# Patient Record
Sex: MALE | Race: WHITE | NOT HISPANIC OR LATINO | Employment: OTHER | URBAN - NONMETROPOLITAN AREA
[De-identification: names, ages, dates, MRNs, and addresses within clinical notes are randomized per-mention and may not be internally consistent; named-entity substitution may affect disease eponyms.]

---

## 2022-03-16 ENCOUNTER — OFFICE VISIT (OUTPATIENT)
Dept: CARDIOLOGY | Facility: CLINIC | Age: 62
End: 2022-03-16

## 2022-03-16 VITALS
HEIGHT: 67 IN | DIASTOLIC BLOOD PRESSURE: 81 MMHG | OXYGEN SATURATION: 97 % | HEART RATE: 72 BPM | BODY MASS INDEX: 32.9 KG/M2 | WEIGHT: 209.6 LBS | SYSTOLIC BLOOD PRESSURE: 131 MMHG

## 2022-03-16 DIAGNOSIS — R06.02 SHORTNESS OF BREATH: ICD-10-CM

## 2022-03-16 DIAGNOSIS — I10 PRIMARY HYPERTENSION: ICD-10-CM

## 2022-03-16 DIAGNOSIS — R07.2 PRECORDIAL PAIN: Primary | ICD-10-CM

## 2022-03-16 PROCEDURE — 99204 OFFICE O/P NEW MOD 45 MIN: CPT | Performed by: PHYSICIAN ASSISTANT

## 2022-03-16 RX ORDER — LEVOTHYROXINE SODIUM 0.05 MG/1
50 TABLET ORAL DAILY
Status: ON HOLD | COMMUNITY
End: 2022-07-14

## 2022-03-16 RX ORDER — ATORVASTATIN CALCIUM 20 MG/1
20 TABLET, FILM COATED ORAL DAILY
COMMUNITY
End: 2022-07-20 | Stop reason: HOSPADM

## 2022-03-16 RX ORDER — BUSPIRONE HYDROCHLORIDE 10 MG/1
10 TABLET ORAL 3 TIMES DAILY
COMMUNITY

## 2022-03-16 RX ORDER — QUETIAPINE FUMARATE 50 MG/1
50 TABLET, FILM COATED ORAL NIGHTLY
COMMUNITY
End: 2022-08-04

## 2022-03-16 RX ORDER — LOSARTAN POTASSIUM 100 MG/1
100 TABLET ORAL DAILY
Status: ON HOLD | COMMUNITY
End: 2022-07-20 | Stop reason: SDUPTHER

## 2022-03-16 RX ORDER — ASPIRIN 81 MG/1
81 TABLET ORAL DAILY
COMMUNITY
End: 2022-07-20 | Stop reason: HOSPADM

## 2022-03-16 RX ORDER — DULOXETIN HYDROCHLORIDE 60 MG/1
60 CAPSULE, DELAYED RELEASE ORAL DAILY
COMMUNITY

## 2022-03-16 RX ORDER — TIZANIDINE 4 MG/1
4 TABLET ORAL 2 TIMES DAILY PRN
COMMUNITY

## 2022-03-16 RX ORDER — CARVEDILOL 6.25 MG/1
6.25 TABLET ORAL 2 TIMES DAILY
Qty: 60 TABLET | Refills: 5 | Status: SHIPPED | OUTPATIENT
Start: 2022-03-16 | End: 2022-07-20 | Stop reason: HOSPADM

## 2022-03-16 RX ORDER — CETIRIZINE HYDROCHLORIDE 10 MG/1
10 TABLET ORAL DAILY
COMMUNITY

## 2022-03-16 RX ORDER — LANOLIN ALCOHOL/MO/W.PET/CERES
1000 CREAM (GRAM) TOPICAL DAILY
COMMUNITY

## 2022-03-16 RX ORDER — OMEPRAZOLE 40 MG/1
40 CAPSULE, DELAYED RELEASE ORAL DAILY
COMMUNITY

## 2022-03-16 NOTE — PROGRESS NOTES
Subjective   Josh Horan is a 61 y.o. male     Chief Complaint   Patient presents with   • Establish Care     ABN Ekg 2/15/2022 in chart    • Chest Pain       HPI  The patient presents into the clinic today for establishment of cardiac care.  This very pleasant patient is referred in the setting of chest pain, hypertension, and issues otherwise.  He denies cardiovascular history.  He reports a longstanding history of chest discomfort, but this is worsened significantly after his diagnosis of COVID several weeks ago.  He reports that historically he would have mild chest aching with occasional numbness to bilateral upper extremities.  Now, since COVID, he has had intense precordial burning and pressure at times.  He has referral to mainly the left upper extremity, but occasionally the right upper extremity as well.  Symptoms tend to occur with exertion or when very stressed.  He gets very dyspneic when experiencing chest discomfort.  Occasionally he will have tachycardia and palpitations at that time as well.  He really has had no dizziness nor syncope.  He notes that since COVID he also has had progressive hypertension.  He is very compliant with losartan, but despite the same, he typically has an average blood pressure of 145/85 and higher.  He did see his primary care provider where laboratories were performed.  Those were benign.  An EKG indicated sinus rhythm with minor nonspecific ST and T wave changes.  With all the above it was recommended that the patient be referred on for cardiac evaluation and consideration of cardiac work-up at that time.      Current Outpatient Medications   Medication Sig Dispense Refill   • aspirin 81 MG EC tablet Take 81 mg by mouth Daily.     • atorvastatin (LIPITOR) 20 MG tablet Take 20 mg by mouth Daily.     • busPIRone (BUSPAR) 10 MG tablet Take 10 mg by mouth 3 (Three) Times a Day.     • cetirizine (zyrTEC) 10 MG tablet Take 10 mg by mouth Daily.     • DULoxetine (CYMBALTA) 60  MG capsule Take 60 mg by mouth Daily.     • Ergocalciferol (VITAMIN D2 PO) Take  by mouth.     • levothyroxine (Euthyrox) 50 MCG tablet Take 50 mcg by mouth Daily.     • losartan (COZAAR) 100 MG tablet Take 100 mg by mouth Daily.     • omeprazole (priLOSEC) 40 MG capsule Take 40 mg by mouth Daily.     • QUEtiapine (SEROquel) 50 MG tablet Take 50 mg by mouth Every Night.     • tiZANidine (ZANAFLEX) 4 MG tablet Take 4 mg by mouth At Night As Needed for Muscle Spasms.     • vitamin B-12 (CYANOCOBALAMIN) 1000 MCG tablet Take 1,000 mcg by mouth Daily.       No current facility-administered medications for this visit.       Patient has no known allergies.    Past Medical History:   Diagnosis Date   • Acid reflux    • Anxiety    • Arthritis    • Hyperlipidemia    • Hypertension    • Lyme disease    • Seasonal allergies    • Sleep apnea        Social History     Socioeconomic History   • Marital status: Single   Tobacco Use   • Smoking status: Current Every Day Smoker   • Smokeless tobacco: Never Used   Substance and Sexual Activity   • Alcohol use: Never   • Drug use: Yes     Types: Marijuana   • Sexual activity: Defer       Family History   Problem Relation Age of Onset   • Hypertension Mother    • Heart disease Mother    • Cancer Mother    • Heart disease Father    • Heart attack Father        Review of Systems   Constitutional: Positive for fatigue. Negative for chills and fever.   HENT: Positive for congestion and rhinorrhea. Negative for sore throat.    Eyes: Positive for visual disturbance (glasses).   Respiratory: Positive for chest tightness and shortness of breath. Negative for wheezing.    Cardiovascular: Positive for palpitations. Negative for chest pain and leg swelling.   Gastrointestinal: Negative.    Endocrine: Negative.    Genitourinary: Negative.    Musculoskeletal: Positive for arthralgias, back pain and neck pain.   Skin: Negative.  Negative for rash and wound.   Allergic/Immunologic: Positive for  "environmental allergies.   Neurological: Positive for dizziness, numbness (arms / legs ) and headaches. Negative for weakness.   Hematological: Negative.  Does not bruise/bleed easily.   Psychiatric/Behavioral: Sleep disturbance: c-pap ( not using )        Objective     Vitals:    03/16/22 1453   BP: 131/81   BP Location: Left arm   Patient Position: Sitting   Pulse: 72   SpO2: 97%   Weight: 95.1 kg (209 lb 9.6 oz)   Height: 170.2 cm (67\")        /81 (BP Location: Left arm, Patient Position: Sitting)   Pulse 72   Ht 170.2 cm (67\")   Wt 95.1 kg (209 lb 9.6 oz)   SpO2 97%   BMI 32.83 kg/m²      Lab Results (most recent)     None          Physical Exam  Vitals and nursing note reviewed.   Constitutional:       General: He is not in acute distress.     Appearance: He is well-developed.   HENT:      Head: Normocephalic and atraumatic.   Eyes:      Conjunctiva/sclera: Conjunctivae normal.      Pupils: Pupils are equal, round, and reactive to light.   Neck:      Vascular: No JVD.      Trachea: No tracheal deviation.   Cardiovascular:      Rate and Rhythm: Normal rate and regular rhythm.      Heart sounds: Normal heart sounds.   Pulmonary:      Effort: Pulmonary effort is normal.      Breath sounds: Normal breath sounds.   Abdominal:      General: Bowel sounds are normal. There is no distension.      Palpations: Abdomen is soft. There is no mass.      Tenderness: There is no abdominal tenderness. There is no guarding or rebound.   Musculoskeletal:         General: No tenderness or deformity. Normal range of motion.      Cervical back: Normal range of motion and neck supple.   Skin:     General: Skin is warm and dry.      Coloration: Skin is not pale.      Findings: No erythema or rash.   Neurological:      Mental Status: He is alert and oriented to person, place, and time.   Psychiatric:         Behavior: Behavior normal.         Thought Content: Thought content normal.         Judgment: Judgment normal. "         Procedure   Procedures         Assessment/Plan      Diagnosis Plan   1. Precordial pain  Adult Transthoracic Echo Complete W/ Cont if Necessary Per Protocol    Stress Test With Myocardial Perfusion One Day   2. Shortness of breath  Adult Transthoracic Echo Complete W/ Cont if Necessary Per Protocol    Stress Test With Myocardial Perfusion One Day   3. Primary hypertension  Adult Transthoracic Echo Complete W/ Cont if Necessary Per Protocol    Stress Test With Myocardial Perfusion One Day     1.  With chest discomfort and dyspnea, as well as clinical scenario otherwise as of recent, I would like to schedule the patient for cardiac evaluation.  I would schedule the patient for nuclear stress test for ischemia assessment.    2.  I would also schedule the patient for an echocardiogram.  We need to evaluate LV size and function, valvular morphologies, and cardiac structure otherwise.    3.  The patient remains hypertensive despite losartan 100 mg daily, reported compliance with the same.  I would like to go ahead and institute further antihypertensive medication at this time.  We will start him on carvedilol 6.25 mg twice daily dosing.  He will monitor blood pressure and heart rates and call us for any issues with the same.  If blood pressures do not improve on that therapy, he will call back and we can adjust further.    4.  We have discussed lifestyle modifications including diet and exercise with this gentleman otherwise.    5.  We will see him back to review stress and echo findings and recommend him further at that time.  He will call for complications.                      Electronically signed by:

## 2022-04-06 ENCOUNTER — TELEPHONE (OUTPATIENT)
Dept: CARDIOLOGY | Facility: CLINIC | Age: 62
End: 2022-04-06

## 2022-04-06 NOTE — TELEPHONE ENCOUNTER
Spoke to Otilia - she will try to have patient call with BP log by 1 pm     AT [facility]

## 2022-04-06 NOTE — TELEPHONE ENCOUNTER
Spoke to Dulce Piña NP     Patient needs to try and get on a routine taking medication as prescribed so we can start to see a change in BP readings.     He needs to redo BP log for 1 weeks with before med reading an after med reading.     Spoke to patient on above note he verbalized OK , he will call back with before an after readings.     AT [facility]         _________________________________  Patient was started on Carvedilol 6.25      Patient stated chest pain / palpitations have decreased  but BP is still not right      He stated BP readings are kind of all over the place  due to him sleeping in late and not taking medication on a routine schedule.         3/23  150/91 before Med.  After med.  137/94 @ 4:30 ,  Night 136/85     3/24  146/105 before med.  Night 131/87 some point  that night after meds       3/25  160/108 before med.      3/26 144/100 before med.     3/27 161/109 before med     3/28 160/109 before med.  Night 158/99    3/31  145/96 before med.      4/4   147/101 after med.     4/6   155/108  before med.       I told patient I will have a provider look over and will call him back with what needs to be done.     AT [facility]

## 2022-04-27 ENCOUNTER — HOSPITAL ENCOUNTER (OUTPATIENT)
Dept: CARDIOLOGY | Facility: HOSPITAL | Age: 62
Discharge: HOME | End: 2022-04-27

## 2022-04-27 VITALS — HEIGHT: 67 IN | WEIGHT: 209.66 LBS | BODY MASS INDEX: 32.91 KG/M2

## 2022-04-27 DIAGNOSIS — I10 PRIMARY HYPERTENSION: ICD-10-CM

## 2022-04-27 DIAGNOSIS — R06.02 SHORTNESS OF BREATH: ICD-10-CM

## 2022-04-27 DIAGNOSIS — R07.2 PRECORDIAL PAIN: ICD-10-CM

## 2022-04-27 PROCEDURE — A9500 TC99M SESTAMIBI: HCPCS | Performed by: INTERNAL MEDICINE

## 2022-04-27 PROCEDURE — 93017 CV STRESS TEST TRACING ONLY: CPT

## 2022-04-27 PROCEDURE — 0 TECHNETIUM SESTAMIBI: Performed by: INTERNAL MEDICINE

## 2022-04-27 PROCEDURE — 93306 TTE W/DOPPLER COMPLETE: CPT | Performed by: INTERNAL MEDICINE

## 2022-04-27 PROCEDURE — 78452 HT MUSCLE IMAGE SPECT MULT: CPT | Performed by: INTERNAL MEDICINE

## 2022-04-27 PROCEDURE — 93018 CV STRESS TEST I&R ONLY: CPT | Performed by: INTERNAL MEDICINE

## 2022-04-27 PROCEDURE — 25010000002 REGADENOSON 0.4 MG/5ML SOLUTION: Performed by: INTERNAL MEDICINE

## 2022-04-27 PROCEDURE — 93306 TTE W/DOPPLER COMPLETE: CPT

## 2022-04-27 PROCEDURE — 78452 HT MUSCLE IMAGE SPECT MULT: CPT

## 2022-04-27 RX ADMIN — TECHNETIUM TC 99M SESTAMIBI 1 DOSE: 1 INJECTION INTRAVENOUS at 12:22

## 2022-04-27 RX ADMIN — TECHNETIUM TC 99M SESTAMIBI 1 DOSE: 1 INJECTION INTRAVENOUS at 13:42

## 2022-04-27 RX ADMIN — REGADENOSON 0.4 MG: 0.08 INJECTION, SOLUTION INTRAVENOUS at 13:42

## 2022-04-29 LAB
BH CV REST NUCLEAR ISOTOPE DOSE: 10 MCI
BH CV STRESS COMMENTS STAGE 1: NORMAL
BH CV STRESS DOSE REGADENOSON STAGE 1: 0.4
BH CV STRESS DURATION MIN STAGE 1: 0
BH CV STRESS DURATION SEC STAGE 1: 10
BH CV STRESS NUCLEAR ISOTOPE DOSE: 30 MCI
BH CV STRESS PROTOCOL 1: NORMAL
BH CV STRESS RECOVERY BP: NORMAL MMHG
BH CV STRESS RECOVERY HR: 55 BPM
BH CV STRESS STAGE 1: 1
MAXIMAL PREDICTED HEART RATE: 161 BPM
PERCENT MAX PREDICTED HR: 38.51 %
STRESS BASELINE BP: NORMAL MMHG
STRESS BASELINE HR: 54 BPM
STRESS PERCENT HR: 45 %
STRESS POST PEAK BP: NORMAL MMHG
STRESS POST PEAK HR: 62 BPM
STRESS TARGET HR: 137 BPM

## 2022-05-03 ENCOUNTER — TELEPHONE (OUTPATIENT)
Dept: CARDIOLOGY | Facility: CLINIC | Age: 62
End: 2022-05-03

## 2022-05-03 NOTE — TELEPHONE ENCOUNTER
Patient to be seen in office 5/10/2022 @ 9 am to go over ABN Stress     AT [facility]         ----- Message from MARBIN Muñoz sent at 5/2/2022  9:06 AM EDT -----  Follow-up within a couple of weeks.

## 2022-05-08 LAB
AORTIC DIMENSIONLESS INDEX: 0.9 (DI)
BH CV ECHO MEAS - ACS: 1.86 CM
BH CV ECHO MEAS - AO MAX PG: 5.8 MMHG
BH CV ECHO MEAS - AO MEAN PG: 3.3 MMHG
BH CV ECHO MEAS - AO ROOT DIAM: 3 CM
BH CV ECHO MEAS - AO V2 MAX: 120.8 CM/SEC
BH CV ECHO MEAS - AO V2 VTI: 28.2 CM
BH CV ECHO MEAS - AVA(I,D): 2.49 CM2
BH CV ECHO MEAS - EDV(CUBED): 78.2 ML
BH CV ECHO MEAS - EDV(MOD-SP4): 87.8 ML
BH CV ECHO MEAS - EF(MOD-SP4): 57.9 %
BH CV ECHO MEAS - EF_3D-VOL: 52 %
BH CV ECHO MEAS - ESV(CUBED): 17.9 ML
BH CV ECHO MEAS - ESV(MOD-SP4): 37 ML
BH CV ECHO MEAS - FS: 38.8 %
BH CV ECHO MEAS - IVS/LVPW: 0.82 CM
BH CV ECHO MEAS - IVSD: 1.07 CM
BH CV ECHO MEAS - LA DIMENSION: 3.8 CM
BH CV ECHO MEAS - LAT PEAK E' VEL: 11.1 CM/SEC
BH CV ECHO MEAS - LV DIASTOLIC VOL/BSA (35-75): 42.6 CM2
BH CV ECHO MEAS - LV MASS(C)D: 181.2 GRAMS
BH CV ECHO MEAS - LV MAX PG: 4.8 MMHG
BH CV ECHO MEAS - LV MEAN PG: 2.46 MMHG
BH CV ECHO MEAS - LV SYSTOLIC VOL/BSA (12-30): 18 CM2
BH CV ECHO MEAS - LV V1 MAX: 109.3 CM/SEC
BH CV ECHO MEAS - LV V1 VTI: 22.3 CM
BH CV ECHO MEAS - LVIDD: 4.3 CM
BH CV ECHO MEAS - LVIDS: 2.6 CM
BH CV ECHO MEAS - LVOT AREA: 3.1 CM2
BH CV ECHO MEAS - LVOT DIAM: 2 CM
BH CV ECHO MEAS - LVPWD: 1.31 CM
BH CV ECHO MEAS - MED PEAK E' VEL: 6.9 CM/SEC
BH CV ECHO MEAS - MV A MAX VEL: 64.7 CM/SEC
BH CV ECHO MEAS - MV DEC SLOPE: 280.8 CM/SEC2
BH CV ECHO MEAS - MV DEC TIME: 0.19 MSEC
BH CV ECHO MEAS - MV E MAX VEL: 73.3 CM/SEC
BH CV ECHO MEAS - MV E/A: 1.13
BH CV ECHO MEAS - MV MAX PG: 2.8 MMHG
BH CV ECHO MEAS - MV MEAN PG: 1.07 MMHG
BH CV ECHO MEAS - MV P1/2T: 79.3 MSEC
BH CV ECHO MEAS - MV V2 VTI: 31.3 CM
BH CV ECHO MEAS - MVA(P1/2T): 2.8 CM2
BH CV ECHO MEAS - MVA(VTI): 2.24 CM2
BH CV ECHO MEAS - PA V2 MAX: 90.8 CM/SEC
BH CV ECHO MEAS - RV MAX PG: 1.62 MMHG
BH CV ECHO MEAS - RV V1 MAX: 63.7 CM/SEC
BH CV ECHO MEAS - RV V1 VTI: 14.6 CM
BH CV ECHO MEAS - RVDD: 2.8 CM
BH CV ECHO MEAS - SI(MOD-SP4): 24.7 ML/M2
BH CV ECHO MEAS - SV(LVOT): 70.2 ML
BH CV ECHO MEAS - SV(MOD-SP4): 50.8 ML
BH CV ECHO MEASUREMENTS AVERAGE E/E' RATIO: 8.14
LEFT ATRIUM VOLUME INDEX: 22.5 ML/M2
MAXIMAL PREDICTED HEART RATE: 161 BPM
STRESS TARGET HR: 137 BPM

## 2022-05-10 ENCOUNTER — OFFICE VISIT (OUTPATIENT)
Dept: CARDIOLOGY | Facility: CLINIC | Age: 62
End: 2022-05-10

## 2022-05-10 ENCOUNTER — TELEPHONE (OUTPATIENT)
Dept: CARDIOLOGY | Facility: CLINIC | Age: 62
End: 2022-05-10

## 2022-05-10 VITALS
DIASTOLIC BLOOD PRESSURE: 93 MMHG | SYSTOLIC BLOOD PRESSURE: 146 MMHG | BODY MASS INDEX: 33.68 KG/M2 | HEART RATE: 60 BPM | OXYGEN SATURATION: 97 % | HEIGHT: 67 IN | WEIGHT: 214.6 LBS

## 2022-05-10 DIAGNOSIS — R94.39 ABNORMAL NUCLEAR STRESS TEST: ICD-10-CM

## 2022-05-10 DIAGNOSIS — R07.2 PRECORDIAL PAIN: ICD-10-CM

## 2022-05-10 DIAGNOSIS — R06.02 SHORTNESS OF BREATH: ICD-10-CM

## 2022-05-10 PROCEDURE — 99214 OFFICE O/P EST MOD 30 MIN: CPT | Performed by: PHYSICIAN ASSISTANT

## 2022-05-10 RX ORDER — NITROGLYCERIN 0.4 MG/1
TABLET SUBLINGUAL
Qty: 25 TABLET | Refills: 3 | Status: SHIPPED | OUTPATIENT
Start: 2022-05-10

## 2022-05-10 RX ORDER — CLOPIDOGREL BISULFATE 75 MG/1
75 TABLET ORAL DAILY
Qty: 30 TABLET | Refills: 2 | Status: SHIPPED | OUTPATIENT
Start: 2022-05-10 | End: 2022-07-07

## 2022-05-10 NOTE — PROGRESS NOTES
Problem list     Subjective   Josh Horan is a 61 y.o. male     Chief Complaint   Patient presents with   • Follow-up     2 month - ABN testing review    • Chest Pain       HPI  The patient presents back to clinic today to review stress test findings.  We had initially seen him in the setting of symptoms and abnormal EKG findings.  He was subsequent scheduled for stress and an echo.  Stress test suggested a large inferior wall ischemic defect with a mild anterior ischemic wall defect suggested as well.  Echo supported preserved systolic function with no significant valvular nor structural abnormalities.  The patient still does poorly.  Now, with exertion, he has chest tightness.  This limits activity and minimizes what he can complete.  His dyspnea has intensified as well.  He reports no failure nor dysrhythmic symptoms.  He does report ongoing hypertensive issues at times, but since starting carvedilol at last appointment, blood pressures have improved.  The patient has no further complaints otherwise and again presents today to discuss stress test findings.    Current Outpatient Medications on File Prior to Visit   Medication Sig Dispense Refill   • aspirin 81 MG EC tablet Take 81 mg by mouth Daily.     • atorvastatin (LIPITOR) 20 MG tablet Take 20 mg by mouth Daily.     • busPIRone (BUSPAR) 10 MG tablet Take 10 mg by mouth 3 (Three) Times a Day.     • carvedilol (COREG) 6.25 MG tablet Take 1 tablet by mouth 2 (Two) Times a Day. 60 tablet 5   • cetirizine (zyrTEC) 10 MG tablet Take 10 mg by mouth Daily.     • DULoxetine (CYMBALTA) 60 MG capsule Take 60 mg by mouth Daily.     • Ergocalciferol (VITAMIN D2 PO) Take  by mouth.     • levothyroxine (SYNTHROID, LEVOTHROID) 50 MCG tablet Take 50 mcg by mouth Daily.     • losartan (COZAAR) 100 MG tablet Take 100 mg by mouth Daily.     • omeprazole (priLOSEC) 40 MG capsule Take 40 mg by mouth Daily.     • QUEtiapine (SEROquel) 50 MG tablet Take 50 mg by mouth Every Night.      • tiZANidine (ZANAFLEX) 4 MG tablet Take 4 mg by mouth At Night As Needed for Muscle Spasms.     • vitamin B-12 (CYANOCOBALAMIN) 1000 MCG tablet Take 1,000 mcg by mouth Daily.       No current facility-administered medications on file prior to visit.       Patient has no known allergies.    Past Medical History:   Diagnosis Date   • Acid reflux    • Anxiety    • Arthritis    • Hyperlipidemia    • Hypertension    • Lyme disease    • Seasonal allergies    • Sleep apnea        Social History     Socioeconomic History   • Marital status: Single   Tobacco Use   • Smoking status: Current Every Day Smoker   • Smokeless tobacco: Never Used   Substance and Sexual Activity   • Alcohol use: Never   • Drug use: Yes     Types: Marijuana   • Sexual activity: Defer       Family History   Problem Relation Age of Onset   • Hypertension Mother    • Heart disease Mother    • Cancer Mother    • Heart disease Father    • Heart attack Father        Review of Systems   Constitutional: Positive for chills and fatigue. Negative for fever.   HENT: Negative for congestion, rhinorrhea and sore throat.    Eyes: Positive for visual disturbance (glasses).   Respiratory: Positive for shortness of breath. Negative for chest tightness and wheezing.    Cardiovascular: Positive for chest pain. Negative for leg swelling.   Gastrointestinal: Negative.    Endocrine: Negative.    Genitourinary: Negative.    Musculoskeletal: Positive for arthralgias, back pain and neck pain.   Skin: Negative.  Negative for rash and wound.   Allergic/Immunologic: Positive for environmental allergies.   Neurological: Positive for dizziness and headaches. Negative for weakness and numbness.   Hematological: Negative.  Does not bruise/bleed easily.   Psychiatric/Behavioral: Positive for sleep disturbance (c-pap).       Objective   Vitals:    05/10/22 0858   BP: 146/93   BP Location: Left arm   Patient Position: Sitting   Pulse: 60   SpO2: 97%   Weight: 97.3 kg (214 lb 9.6  "oz)   Height: 170 cm (66.93\")      /93 (BP Location: Left arm, Patient Position: Sitting)   Pulse 60   Ht 170 cm (66.93\")   Wt 97.3 kg (214 lb 9.6 oz)   SpO2 97%   BMI 33.68 kg/m²    Lab Results (most recent)     None        Physical Exam  Vitals and nursing note reviewed.   Constitutional:       General: He is not in acute distress.     Appearance: He is well-developed.   HENT:      Head: Normocephalic and atraumatic.   Eyes:      Conjunctiva/sclera: Conjunctivae normal.      Pupils: Pupils are equal, round, and reactive to light.   Neck:      Vascular: No JVD.      Trachea: No tracheal deviation.   Cardiovascular:      Rate and Rhythm: Normal rate and regular rhythm.      Heart sounds: Normal heart sounds.   Pulmonary:      Effort: Pulmonary effort is normal.      Breath sounds: Normal breath sounds.   Abdominal:      General: Bowel sounds are normal. There is no distension.      Palpations: Abdomen is soft. There is no mass.      Tenderness: There is no abdominal tenderness. There is no guarding or rebound.   Musculoskeletal:         General: No tenderness or deformity. Normal range of motion.      Cervical back: Normal range of motion and neck supple.   Skin:     General: Skin is warm and dry.      Coloration: Skin is not pale.      Findings: No erythema or rash.   Neurological:      Mental Status: He is alert and oriented to person, place, and time.   Psychiatric:         Behavior: Behavior normal.         Thought Content: Thought content normal.         Judgment: Judgment normal.           Procedure   Procedures            Diagnosis Plan   1. Precordial pain  [facility]    CBC & Differential    Basic Metabolic Panel   2. Shortness of breath  [facility]    CBC & Differential    Basic Metabolic Panel   3. Abnormal nuclear stress test  [facility]    CBC & Differential    Basic Metabolic Panel     1.  At this time, the patient presents for follow-up with stress test " findings.  He has a large inferior wall ischemic defect and a possible mild anterior wall defect noted as well.  Systolic function appears to be normal both by post stress findings and echo findings as well.  Unfortunately, he has demonstrated progressive chest tightness and shortness of air, felt to represent anginal equivalent symptoms.  In this setting, I feel he needs further evaluation with catheterization and he will be scheduled accordingly.    2.  He will continue aspirin therapy.  We will start Plavix in the precath setting.  We did also give him refills for nitroglycerin.    3.  He will continue statin therapy as well.  We will make no further adjustments in medications.    4.  He will need laboratories in the precath setting, all as outlined above.    5.  He remains hypertensive but overall improved on carvedilol therapy.  He will continue to follow blood pressures and call back for any complications with the same.  We can adjust accordingly.    6.  Further pending results of catheterization.                      Electronically signed by:

## 2022-05-10 NOTE — TELEPHONE ENCOUNTER
ECHO  Pt notified of no acute findings. Provider will discuss results at f/u. Pt reminded of appt date and time.  ----- Message from Marisela Gutiérrez MA sent at 5/10/2022  8:22 AM EDT -----    ----- Message -----  From: Josh Kennedy PA  Sent: 5/9/2022   8:59 AM EDT  To: Marisela Gutiérrez MA    Routine follow-up.

## 2022-06-13 ENCOUNTER — TELEPHONE (OUTPATIENT)
Dept: CARDIOLOGY | Facility: CLINIC | Age: 62
End: 2022-06-13

## 2022-06-13 NOTE — TELEPHONE ENCOUNTER
Spoke to patient to check on pre-cath labs - he stated he had them done 6/9/2022 with his PCP       I have called the office of [facility] - they are faxing to us.       AT [facility]

## 2022-06-24 ENCOUNTER — OUTSIDE FACILITY SERVICE (OUTPATIENT)
Dept: CARDIOLOGY | Facility: CLINIC | Age: 62
End: 2022-06-24

## 2022-06-24 DIAGNOSIS — R06.02 SHORTNESS OF BREATH: ICD-10-CM

## 2022-06-24 DIAGNOSIS — R94.39 ABNORMAL NUCLEAR STRESS TEST: ICD-10-CM

## 2022-06-24 DIAGNOSIS — R07.2 PRECORDIAL PAIN: ICD-10-CM

## 2022-06-24 PROCEDURE — 93458 L HRT ARTERY/VENTRICLE ANGIO: CPT | Performed by: INTERNAL MEDICINE

## 2022-06-27 ENCOUNTER — OFFICE VISIT (OUTPATIENT)
Dept: CARDIAC SURGERY | Facility: CLINIC | Age: 62
End: 2022-06-27

## 2022-06-27 ENCOUNTER — HOSPITAL ENCOUNTER (OUTPATIENT)
Dept: CARDIOLOGY | Facility: HOSPITAL | Age: 62
Discharge: HOME | End: 2022-06-27

## 2022-06-27 VITALS
OXYGEN SATURATION: 98 % | SYSTOLIC BLOOD PRESSURE: 130 MMHG | WEIGHT: 218 LBS | BODY MASS INDEX: 34.21 KG/M2 | HEART RATE: 61 BPM | DIASTOLIC BLOOD PRESSURE: 90 MMHG | HEIGHT: 67 IN | TEMPERATURE: 98.2 F

## 2022-06-27 DIAGNOSIS — Z00.6 ENCOUNTER FOR EXAMINATION FOR NORMAL COMPARISON OR CONTROL IN CLINICAL RESEARCH PROGRAM: ICD-10-CM

## 2022-06-27 DIAGNOSIS — R93.1 ABNORMAL FINDINGS ON DIAGNOSTIC IMAGING OF HEART AND CORONARY CIRCULATION: ICD-10-CM

## 2022-06-27 DIAGNOSIS — I25.10 CORONARY ARTERY DISEASE INVOLVING NATIVE HEART, UNSPECIFIED VESSEL OR LESION TYPE, UNSPECIFIED WHETHER ANGINA PRESENT: Primary | ICD-10-CM

## 2022-06-27 DIAGNOSIS — I25.118 CORONARY ARTERY DISEASE OF NATIVE ARTERY OF NATIVE HEART WITH STABLE ANGINA PECTORIS: Primary | ICD-10-CM

## 2022-06-27 PROCEDURE — 99205 OFFICE O/P NEW HI 60 MIN: CPT | Performed by: THORACIC SURGERY (CARDIOTHORACIC VASCULAR SURGERY)

## 2022-06-27 RX ORDER — IBUPROFEN 800 MG/1
800 TABLET ORAL EVERY 6 HOURS PRN
COMMUNITY

## 2022-06-27 NOTE — PROGRESS NOTES
06/27/2022  Patient Information  Josh Ashton [facility] [address]  [place] [address]   1960  'PCP/Referring Physician'  Jerrica Madrid, APRN  [phone]  No ref. provider found    Chief Complaint   Patient presents with   • Consult     NP per Dr. Salguero for CAD-Complains of Night Sweats, Vomiting, SOB       History of Present Illness:  The patient is a 61-year-old male who was referred to me to evaluate for coronary bypass grafting surgery.  He has had a 3-month history of worsening shortness of breath and chest pain with activity. A catheterization demonstrated significant multivessel disease.  At this time in the office he is alert, conversant and pain-free.  He does have a history of Lyme disease and says this does affect his memory somewhat.      Patient Active Problem List   Diagnosis   • Coronary artery disease of native artery of native heart with stable angina pectoris (HCC)   • Abnormal findings on diagnostic imaging of heart and coronary circulation     Past Medical History:   Diagnosis Date   • Acid reflux    • Anxiety    • Arthritis    • Coronary artery disease    • Depression    • Hiatal hernia    • Hyperlipidemia    • Hypertension    • Hyperthyroidism    • Lyme disease    • Seasonal allergies    • Sleep apnea      Past Surgical History:   Procedure Laterality Date   • OTHER SURGICAL HISTORY      Lymph node removal neck   • VASECTOMY         Current Outpatient Medications:   •  aspirin 81 MG EC tablet, Take 81 mg by mouth Daily., Disp: , Rfl:   •  atorvastatin (LIPITOR) 20 MG tablet, Take 20 mg by mouth Daily., Disp: , Rfl:   •  busPIRone (BUSPAR) 10 MG tablet, Take 10 mg by mouth 3 (Three) Times a Day., Disp: , Rfl:   •  carvedilol (COREG) 6.25 MG tablet, Take 1 tablet by mouth 2 (Two) Times a Day., Disp: 60 tablet, Rfl: 5  •  cetirizine (zyrTEC) 10 MG tablet, Take 10 mg by mouth Daily., Disp: , Rfl:   •   clopidogrel (PLAVIX) 75 MG tablet, Take 1 tablet by mouth Daily., Disp: 30 tablet, Rfl: 2  •  DULoxetine (CYMBALTA) 60 MG capsule, Take 60 mg by mouth Daily., Disp: , Rfl:   •  Ergocalciferol (VITAMIN D2 PO), Take  by mouth., Disp: , Rfl:   •  ibuprofen (ADVIL,MOTRIN) 800 MG tablet, Take 800 mg by mouth Every 6 (Six) Hours As Needed for Mild Pain ., Disp: , Rfl:   •  levothyroxine (SYNTHROID, LEVOTHROID) 50 MCG tablet, Take 50 mcg by mouth Daily. 150 mcg daily, Disp: , Rfl:   •  losartan (COZAAR) 100 MG tablet, Take 100 mg by mouth Daily., Disp: , Rfl:   •  nitroglycerin (NITROSTAT) 0.4 MG SL tablet, 1 under the tongue as needed for angina, may repeat q5mins for up three doses, Disp: 25 tablet, Rfl: 3  •  omeprazole (priLOSEC) 40 MG capsule, Take 40 mg by mouth Daily., Disp: , Rfl:   •  QUEtiapine (SEROquel) 50 MG tablet, Take 50 mg by mouth Every Night., Disp: , Rfl:   •  tiZANidine (ZANAFLEX) 4 MG tablet, Take 4 mg by mouth At Night As Needed for Muscle Spasms., Disp: , Rfl:   •  vitamin B-12 (CYANOCOBALAMIN) 1000 MCG tablet, Take 1,000 mcg by mouth Daily. (Patient not taking: Reported on 6/27/2022), Disp: , Rfl:   No Known Allergies  Social History     Socioeconomic History   • Marital status: Single   • Number of children: 2   Tobacco Use   • Smoking status: Never Smoker   • Smokeless tobacco: Never Used   Vaping Use   • Vaping Use: Never used   Substance and Sexual Activity   • Alcohol use: Never   • Drug use: Yes     Types: Marijuana   • Sexual activity: Defer     Family History   Problem Relation Age of Onset   • Hypertension Mother    • Heart disease Mother    • Cancer Mother    • Heart disease Father    • Heart attack Father      Review of Systems   Constitutional: Positive for chills, malaise/fatigue and night sweats (60 lbs in the past year). Negative for fever and weight loss.   HENT: Negative for hearing loss, odynophagia and sore throat.    Cardiovascular: Positive for chest pain, dyspnea on exertion  "and palpitations. Negative for leg swelling and orthopnea.   Respiratory: Positive for cough, shortness of breath and wheezing. Negative for hemoptysis.    Endocrine: Negative for cold intolerance, heat intolerance, polydipsia, polyphagia and polyuria.   Hematologic/Lymphatic: Does not bruise/bleed easily.   Skin: Negative for itching and rash.   Musculoskeletal: Positive for arthritis and joint pain. Negative for joint swelling and myalgias.   Gastrointestinal: Positive for abdominal pain, constipation, diarrhea, nausea and vomiting. Negative for hematemesis, hematochezia and melena.   Genitourinary: Negative for dysuria, frequency and hematuria.   Neurological: Positive for dizziness and light-headedness. Negative for focal weakness, headaches, numbness and seizures.   Psychiatric/Behavioral: Positive for depression. Negative for suicidal ideas. The patient is nervous/anxious.    All other systems reviewed and are negative.    Vitals:    06/27/22 0938   BP: 130/90   BP Location: Left arm   Patient Position: Sitting   Pulse: 61   Temp: 98.2 °F (36.8 °C)   SpO2: 98%   Weight: 98.9 kg (218 lb)   Height: 170.2 cm (67\")      Physical Exam   CONSTITUTIONAL: Alert and conversant, Well dressed, Well nourished, No acute distress  EYES: Sclera clean, Anicteric, Pupils equal  ENT: No nasal deviation, Trachea midline  NECK: No neck masses, Supple  LUNGS: No wheezing, Cough, non-congested  HEART: No rubs, No murmurs  GASTROINTESTINAL: Soft, non-distended, No masses, Non tender  to palpation, normal bowel sounds  NEURO: No motor deficits, No sensory deficits, Cranial Nerves 2 through 12 grossly intact  PSYCHIATRIC: Oriented to person, place and time, No memory deficits, Mood appropriate  VASCULAR: No carotid bruits, Femoral pulses palpable and symmetric  MUSKULOSKELETAL: No extremity trauma or extremity asymmetry    The ROS, past medical history, surgical history, family history, social history and vitals were reviewed by " myself and corrected as needed.      Labs/Imaging:  I reviewed the cardiac catheterization images.    Assessment/Plan:   The patient is a 61-year-old male who has multivessel coronary disease, obesity, hypertension, hyperlipidemia and Lyme disease.  I plan for coronary bypass grafting surgery and the patient is agreeable to proceed.  He is aware that surgery has risks of stroke, bleeding, infection, death and renal failure and agrees to proceed. No guarantees have been made as to the outcome.  HYPERTENSION: The patient has known hypertension. I will manage the blood pressure closely intraoperatively and postoperatively to maintain end organ perfusion, but also to mitigate against bleeding caused by extreme hypertension.  Will evaluate for beta blockade prior to anesthesia. Hypertension postoperatively will complicate bleeding problems.  HYPERLIPIDEMIA: The patient's statin medication will be continued up to and including the day of surgery. Dietary changes have been discussed.  I indicated the expected hospital stay is 5 days and then for 8 to 10 weeks after surgery, he cannot engage in strenuous activity. He is agreeable to proceed.    Patient Active Problem List   Diagnosis   • Coronary artery disease of native artery of native heart with stable angina pectoris (HCC)   • Abnormal findings on diagnostic imaging of heart and coronary circulation     CC: PRISCILLA Lee editing for Baldomero Anton MD      I, Baldomero Anton MD, have read and agree with the editing done by Tea Garcia, .

## 2022-07-07 ENCOUNTER — PREP FOR SURGERY (OUTPATIENT)
Dept: OTHER | Facility: HOSPITAL | Age: 62
End: 2022-07-07

## 2022-07-07 ENCOUNTER — OFFICE VISIT (OUTPATIENT)
Dept: CARDIOLOGY | Facility: CLINIC | Age: 62
End: 2022-07-07

## 2022-07-07 VITALS
OXYGEN SATURATION: 98 % | BODY MASS INDEX: 33.59 KG/M2 | SYSTOLIC BLOOD PRESSURE: 141 MMHG | HEART RATE: 74 BPM | HEIGHT: 67 IN | DIASTOLIC BLOOD PRESSURE: 92 MMHG | WEIGHT: 214 LBS

## 2022-07-07 DIAGNOSIS — R06.02 SHORTNESS OF BREATH: ICD-10-CM

## 2022-07-07 DIAGNOSIS — I10 PRIMARY HYPERTENSION: ICD-10-CM

## 2022-07-07 DIAGNOSIS — I25.118 CORONARY ARTERY DISEASE OF NATIVE ARTERY OF NATIVE HEART WITH STABLE ANGINA PECTORIS: Primary | ICD-10-CM

## 2022-07-07 PROCEDURE — 99213 OFFICE O/P EST LOW 20 MIN: CPT | Performed by: PHYSICIAN ASSISTANT

## 2022-07-07 RX ORDER — CHLORHEXIDINE GLUCONATE 500 MG/1
1 CLOTH TOPICAL EVERY 12 HOURS PRN
Status: CANCELLED | OUTPATIENT
Start: 2022-07-12

## 2022-07-07 RX ORDER — ASPIRIN 325 MG
325 TABLET ORAL NIGHTLY
Status: CANCELLED | OUTPATIENT
Start: 2022-07-11 | End: 2022-07-12

## 2022-07-07 RX ORDER — CHLORHEXIDINE GLUCONATE 0.12 MG/ML
15 RINSE ORAL ONCE
Status: CANCELLED | OUTPATIENT
Start: 2022-07-12 | End: 2022-07-12

## 2022-07-07 RX ORDER — GABAPENTIN 300 MG/1
300 CAPSULE ORAL ONCE
Status: CANCELLED | OUTPATIENT
Start: 2022-07-07 | End: 2022-07-07

## 2022-07-07 RX ORDER — ACETAMINOPHEN 500 MG
1000 TABLET ORAL ONCE
Status: CANCELLED | OUTPATIENT
Start: 2022-07-12 | End: 2022-07-12

## 2022-07-07 RX ORDER — NITROGLYCERIN 0.4 MG/1
0.4 TABLET SUBLINGUAL
Status: CANCELLED | OUTPATIENT
Start: 2022-07-12

## 2022-07-07 RX ORDER — CHLORHEXIDINE GLUCONATE 500 MG/1
1 CLOTH TOPICAL EVERY 12 HOURS PRN
Status: CANCELLED | OUTPATIENT
Start: 2022-07-11

## 2022-07-07 NOTE — PROGRESS NOTES
Problem list     Subjective   Josh Horan is a 61 y.o. male     Chief Complaint   Patient presents with   • Follow-up     Cath 6/24/2022 / open heart set for 7/12/2022 carlitos.    • Chest Pain       HPI  The patient presents into the clinic today for routine follow-up.  He presents because of recent catheterization which was performed.  Cath was performed given inferior and anterior wall ischemia.  Cath supported diffuse three-vessel disease with recommendations to proceed with bypass.  The patient has seen Dr. Anton and CT surgery and CABG has been scheduled next week.  Cath site is stable.  He has stable dyspnea and fatigue.  He has had no further chest tightness, but reports that he has minimized activity at this time.  He has no further complaints.    Current Outpatient Medications on File Prior to Visit   Medication Sig Dispense Refill   • aspirin 81 MG EC tablet Take 81 mg by mouth Daily.     • atorvastatin (LIPITOR) 20 MG tablet Take 20 mg by mouth Daily.     • busPIRone (BUSPAR) 10 MG tablet Take 10 mg by mouth 3 (Three) Times a Day.     • carvedilol (COREG) 6.25 MG tablet Take 1 tablet by mouth 2 (Two) Times a Day. 60 tablet 5   • cetirizine (zyrTEC) 10 MG tablet Take 10 mg by mouth Daily.     • DULoxetine (CYMBALTA) 60 MG capsule Take 60 mg by mouth Daily.     • Ergocalciferol (VITAMIN D2 PO) Take  by mouth.     • ibuprofen (ADVIL,MOTRIN) 800 MG tablet Take 800 mg by mouth Every 6 (Six) Hours As Needed for Mild Pain .     • levothyroxine (SYNTHROID, LEVOTHROID) 50 MCG tablet Take 50 mcg by mouth Daily. 150 mcg daily     • losartan (COZAAR) 100 MG tablet Take 100 mg by mouth Daily.     • nitroglycerin (NITROSTAT) 0.4 MG SL tablet 1 under the tongue as needed for angina, may repeat q5mins for up three doses 25 tablet 3   • omeprazole (priLOSEC) 40 MG capsule Take 40 mg by mouth Daily.     • QUEtiapine (SEROquel) 50 MG tablet Take 50 mg by mouth Every Night.     • tiZANidine (ZANAFLEX) 4 MG tablet Take 4 mg by  mouth At Night As Needed for Muscle Spasms.     • vitamin B-12 (CYANOCOBALAMIN) 1000 MCG tablet Take 1,000 mcg by mouth Daily.     • [DISCONTINUED] clopidogrel (PLAVIX) 75 MG tablet Take 1 tablet by mouth Daily. 30 tablet 2     No current facility-administered medications on file prior to visit.       Patient has no known allergies.    Past Medical History:   Diagnosis Date   • Acid reflux    • Anxiety    • Arthritis    • Coronary artery disease    • Depression    • Hiatal hernia    • Hyperlipidemia    • Hypertension    • Hyperthyroidism    • Lyme disease    • Seasonal allergies    • Sleep apnea        Social History     Socioeconomic History   • Marital status: Single   • Number of children: 2   Tobacco Use   • Smoking status: Never Smoker   • Smokeless tobacco: Never Used   Vaping Use   • Vaping Use: Never used   Substance and Sexual Activity   • Alcohol use: Never   • Drug use: Yes     Types: Marijuana   • Sexual activity: Defer       Family History   Problem Relation Age of Onset   • Hypertension Mother    • Heart disease Mother    • Cancer Mother    • Heart disease Father    • Heart attack Father        Review of Systems   Constitutional: Positive for chills and fatigue. Negative for fever.   HENT: Positive for congestion. Negative for rhinorrhea and sore throat.    Eyes: Positive for visual disturbance (glasses).   Respiratory: Positive for chest tightness and shortness of breath.    Cardiovascular: Positive for chest pain. Negative for palpitations and leg swelling.   Gastrointestinal: Negative.    Endocrine: Negative.    Genitourinary: Negative.    Musculoskeletal: Positive for arthralgias, back pain and neck pain.   Skin: Negative.  Negative for rash and wound.   Allergic/Immunologic: Positive for environmental allergies.   Neurological: Positive for dizziness and headaches. Negative for weakness and numbness.   Hematological: Negative.  Does not bruise/bleed easily.   Psychiatric/Behavioral: Positive for  "sleep disturbance (cpap ).   All other systems reviewed and are negative.      Objective   Vitals:    07/07/22 1453   BP: 141/92   BP Location: Left arm   Patient Position: Sitting   Pulse: 74   SpO2: 98%   Weight: 97.1 kg (214 lb)   Height: 170.2 cm (67\")      /92 (BP Location: Left arm, Patient Position: Sitting)   Pulse 74   Ht 170.2 cm (67\")   Wt 97.1 kg (214 lb)   SpO2 98%   BMI 33.52 kg/m²    Lab Results (most recent)     None        Physical Exam  Vitals and nursing note reviewed.   Constitutional:       General: He is not in acute distress.     Appearance: He is well-developed.   HENT:      Head: Normocephalic and atraumatic.   Eyes:      Conjunctiva/sclera: Conjunctivae normal.      Pupils: Pupils are equal, round, and reactive to light.   Neck:      Vascular: No JVD.      Trachea: No tracheal deviation.   Cardiovascular:      Rate and Rhythm: Normal rate and regular rhythm.      Heart sounds: Normal heart sounds.   Pulmonary:      Effort: Pulmonary effort is normal.      Breath sounds: Normal breath sounds.   Abdominal:      General: Bowel sounds are normal. There is no distension.      Palpations: Abdomen is soft. There is no mass.      Tenderness: There is no abdominal tenderness. There is no guarding or rebound.   Musculoskeletal:         General: No tenderness or deformity. Normal range of motion.      Cervical back: Normal range of motion and neck supple.   Skin:     General: Skin is warm and dry.      Coloration: Skin is not pale.      Findings: No erythema or rash.   Neurological:      Mental Status: He is alert and oriented to person, place, and time.   Psychiatric:         Behavior: Behavior normal.         Thought Content: Thought content normal.         Judgment: Judgment normal.           Procedure   Procedures       Assessment & Plan      Diagnosis Plan   1. Coronary artery disease of native artery of native heart with stable angina pectoris (HCC)     2. Shortness of breath     3. " Primary hypertension       1.  The patient has diffuse coronary disease, three-vessel, which requires coronary artery bypass grafting.  CABG has been scheduled by his CT surgeon.  That is pending next week.  We will follow along at this time.  Clinically, the patient is stable.    2.  His dyspnea remains at baseline.  He really has no further significant cardiovascular symptoms.  For recurrence of symptoms, he will call immediately.    3.  Blood pressures are well controlled.  He will continue antihypertensive medications and medical regimen otherwise without change.    4.  We will see him in follow-up post operatively.                      Electronically signed by:

## 2022-07-11 ENCOUNTER — HOSPITAL ENCOUNTER (OUTPATIENT)
Dept: PULMONOLOGY | Facility: HOSPITAL | Age: 62
Discharge: HOME | End: 2022-07-11

## 2022-07-11 ENCOUNTER — PRE-ADMISSION TESTING (OUTPATIENT)
Dept: PREADMISSION TESTING | Facility: HOSPITAL | Age: 62
End: 2022-07-11

## 2022-07-11 ENCOUNTER — ANESTHESIA EVENT (OUTPATIENT)
Dept: PERIOP | Facility: HOSPITAL | Age: 62
End: 2022-07-11

## 2022-07-11 ENCOUNTER — HOSPITAL ENCOUNTER (OUTPATIENT)
Dept: GENERAL RADIOLOGY | Facility: HOSPITAL | Age: 62
Discharge: HOME | End: 2022-07-11

## 2022-07-11 VITALS — BODY MASS INDEX: 33.91 KG/M2 | HEIGHT: 67 IN | WEIGHT: 216.05 LBS | OXYGEN SATURATION: 98 %

## 2022-07-11 DIAGNOSIS — I25.118 CORONARY ARTERY DISEASE OF NATIVE ARTERY OF NATIVE HEART WITH STABLE ANGINA PECTORIS: ICD-10-CM

## 2022-07-11 LAB
ABO GROUP BLD: NORMAL
ALBUMIN SERPL-MCNC: 4.6 G/DL (ref 3.5–5.2)
ALBUMIN/GLOB SERPL: 1.9 G/DL
ALP SERPL-CCNC: 91 U/L (ref 39–117)
ALT SERPL W P-5'-P-CCNC: 12 U/L (ref 1–41)
AMPHET+METHAMPHET UR QL: NEGATIVE
AMPHETAMINES UR QL: NEGATIVE
ANION GAP SERPL CALCULATED.3IONS-SCNC: 9 MMOL/L (ref 5–15)
APTT PPP: 28.2 SECONDS (ref 22–39)
AST SERPL-CCNC: 13 U/L (ref 1–40)
BARBITURATES UR QL SCN: NEGATIVE
BASOPHILS # BLD AUTO: 0.08 10*3/MM3 (ref 0–0.2)
BASOPHILS NFR BLD AUTO: 0.8 % (ref 0–1.5)
BENZODIAZ UR QL SCN: NEGATIVE
BILIRUB SERPL-MCNC: 0.3 MG/DL (ref 0–1.2)
BLD GP AB SCN SERPL QL: NEGATIVE
BUN SERPL-MCNC: 14 MG/DL (ref 6–20)
BUN/CREAT SERPL: 16.7 (ref 7–25)
BUPRENORPHINE SERPL-MCNC: NEGATIVE NG/ML
CALCIUM SPEC-SCNC: 9.2 MG/DL (ref 8.6–10.5)
CANNABINOIDS SERPL QL: POSITIVE
CHLORIDE SERPL-SCNC: 105 MMOL/L (ref 98–107)
CO2 SERPL-SCNC: 26 MMOL/L (ref 22–29)
COCAINE UR QL: NEGATIVE
CREAT SERPL-MCNC: 0.84 MG/DL (ref 0.76–1.27)
DEPRECATED RDW RBC AUTO: 39.7 FL (ref 37–54)
EGFRCR SERPLBLD CKD-EPI 2021: 100.5 ML/MIN/1.73
EOSINOPHIL # BLD AUTO: 0.64 10*3/MM3 (ref 0–0.4)
EOSINOPHIL NFR BLD AUTO: 6.3 % (ref 0.3–6.2)
ERYTHROCYTE [DISTWIDTH] IN BLOOD BY AUTOMATED COUNT: 12 % (ref 12.3–15.4)
FLUAV RNA RESP QL NAA+PROBE: NOT DETECTED
FLUBV RNA RESP QL NAA+PROBE: NOT DETECTED
GLOBULIN UR ELPH-MCNC: 2.4 GM/DL
GLUCOSE SERPL-MCNC: 111 MG/DL (ref 65–99)
HBA1C MFR BLD: 5.4 % (ref 4.8–5.6)
HCT VFR BLD AUTO: 42.3 % (ref 37.5–51)
HGB BLD-MCNC: 14.6 G/DL (ref 13–17.7)
IMM GRANULOCYTES # BLD AUTO: 0.03 10*3/MM3 (ref 0–0.05)
IMM GRANULOCYTES NFR BLD AUTO: 0.3 % (ref 0–0.5)
INR PPP: 0.97 (ref 0.84–1.13)
LYMPHOCYTES # BLD AUTO: 3.53 10*3/MM3 (ref 0.7–3.1)
LYMPHOCYTES NFR BLD AUTO: 34.5 % (ref 19.6–45.3)
MAGNESIUM SERPL-MCNC: 2 MG/DL (ref 1.6–2.6)
MCH RBC QN AUTO: 30.7 PG (ref 26.6–33)
MCHC RBC AUTO-ENTMCNC: 34.5 G/DL (ref 31.5–35.7)
MCV RBC AUTO: 89.1 FL (ref 79–97)
METHADONE UR QL SCN: NEGATIVE
MONOCYTES # BLD AUTO: 0.7 10*3/MM3 (ref 0.1–0.9)
MONOCYTES NFR BLD AUTO: 6.8 % (ref 5–12)
NEUTROPHILS NFR BLD AUTO: 5.26 10*3/MM3 (ref 1.7–7)
NEUTROPHILS NFR BLD AUTO: 51.3 % (ref 42.7–76)
NRBC BLD AUTO-RTO: 0 /100 WBC (ref 0–0.2)
OPIATES UR QL: NEGATIVE
OXYCODONE UR QL SCN: NEGATIVE
PA ADP PRP-ACNC: 199 PRU
PCP UR QL SCN: NEGATIVE
PLATELET # BLD AUTO: 212 10*3/MM3 (ref 140–450)
PMV BLD AUTO: 10 FL (ref 6–12)
POTASSIUM SERPL-SCNC: 4.1 MMOL/L (ref 3.5–5.2)
PROPOXYPH UR QL: NEGATIVE
PROT SERPL-MCNC: 7 G/DL (ref 6–8.5)
PROTHROMBIN TIME: 12.7 SECONDS (ref 11.4–14.4)
RBC # BLD AUTO: 4.75 10*6/MM3 (ref 4.14–5.8)
RH BLD: POSITIVE
SARS-COV-2 RNA RESP QL NAA+PROBE: NOT DETECTED
SODIUM SERPL-SCNC: 140 MMOL/L (ref 136–145)
T&S EXPIRATION DATE: NORMAL
TRICYCLICS UR QL SCN: POSITIVE
WBC NRBC COR # BLD: 10.24 10*3/MM3 (ref 3.4–10.8)

## 2022-07-11 PROCEDURE — 86900 BLOOD TYPING SEROLOGIC ABO: CPT

## 2022-07-11 PROCEDURE — 94010 BREATHING CAPACITY TEST: CPT | Performed by: INTERNAL MEDICINE

## 2022-07-11 PROCEDURE — 86923 COMPATIBILITY TEST ELECTRIC: CPT

## 2022-07-11 PROCEDURE — 80305 DRUG TEST PRSMV DIR OPT OBS: CPT

## 2022-07-11 PROCEDURE — 87636 SARSCOV2 & INF A&B AMP PRB: CPT

## 2022-07-11 PROCEDURE — 85730 THROMBOPLASTIN TIME PARTIAL: CPT

## 2022-07-11 PROCEDURE — 85025 COMPLETE CBC W/AUTO DIFF WBC: CPT

## 2022-07-11 PROCEDURE — 83735 ASSAY OF MAGNESIUM: CPT

## 2022-07-11 PROCEDURE — 80306 DRUG TEST PRSMV INSTRMNT: CPT

## 2022-07-11 PROCEDURE — 71046 X-RAY EXAM CHEST 2 VIEWS: CPT

## 2022-07-11 PROCEDURE — 85610 PROTHROMBIN TIME: CPT

## 2022-07-11 PROCEDURE — 86901 BLOOD TYPING SEROLOGIC RH(D): CPT

## 2022-07-11 PROCEDURE — 94010 BREATHING CAPACITY TEST: CPT

## 2022-07-11 PROCEDURE — 86850 RBC ANTIBODY SCREEN: CPT

## 2022-07-11 PROCEDURE — 80053 COMPREHEN METABOLIC PANEL: CPT

## 2022-07-11 PROCEDURE — 83036 HEMOGLOBIN GLYCOSYLATED A1C: CPT

## 2022-07-11 PROCEDURE — C9803 HOPD COVID-19 SPEC COLLECT: HCPCS

## 2022-07-11 PROCEDURE — 36415 COLL VENOUS BLD VENIPUNCTURE: CPT

## 2022-07-11 PROCEDURE — 85576 BLOOD PLATELET AGGREGATION: CPT

## 2022-07-11 RX ORDER — SODIUM CHLORIDE 0.9 % (FLUSH) 0.9 %
10 SYRINGE (ML) INJECTION AS NEEDED
Status: CANCELLED | OUTPATIENT
Start: 2022-07-11

## 2022-07-11 RX ORDER — FAMOTIDINE 10 MG/ML
20 INJECTION, SOLUTION INTRAVENOUS ONCE
Status: CANCELLED | OUTPATIENT
Start: 2022-07-11 | End: 2022-07-11

## 2022-07-11 RX ORDER — SODIUM CHLORIDE 0.9 % (FLUSH) 0.9 %
10 SYRINGE (ML) INJECTION EVERY 12 HOURS SCHEDULED
Status: CANCELLED | OUTPATIENT
Start: 2022-07-11

## 2022-07-11 NOTE — PAT
covid in pat.     ekg on chart from 2/15/22.     98% RA so abg not needed.     Hx mrsa- 2 years ago on back- informed dr pickard office (dariana) and she spoke with Noris WELLS and states vanco for preop is needed- added to epic at this time.     Per Anesthesia Request, patient instructed not to take their ACE/ARB medications on the AM of surgery.    Patient instructed to drink 20 ounces (or until full) of Gatorade and it needs to be completed 1 hour (for Main OR patients) or 2 hours (scheduled  section patients) before given arrival time for procedure (NO RED Gatorade)    Patient verbalized understanding.    Patient viewed general PAT education video as instructed in their preoperative information received from their surgeon.  Patient stated the general PAT education video was viewed in its entirety and survey completed.  Copies of PAT general education handouts (Incentive Spirometry, Meds to Beds Program, Patient Belongings, Pre-op skin preparation instructions, Blood Glucose testing, Visitor policy, Surgery FAQ, Code H) distributed to patient if not printed. Education related to the PAT pass and skin preparation for surgery (if applicable) completed in PAT as a reinforcement to PAT education video. Patient instructed to return PAT pass provided today as well as completed skin preparation sheet (if applicable) on the day of procedure.     Additionally if patient had not viewed video yet but intended to view it at home or in our waiting area, then referred them to the handout with QR code/link provided during PAT visit.  Instructed patient to complete survey after viewing the video in its entirety.  Encouraged patient/family to read PAT general education handouts thoroughly and notify PAT staff with any questions or concerns. Patient verbalized understanding of all information and priority content.    Patient/family provided a [facility] Heart Surgery book (if not already provided by the CT  surgeon's office).  Encouraged patient and family to read the Heart Surgery book if they had not already done so.     Education during PAT visit included general perioperative instructions that are routine for all surgical patients (PAT PASS, wipes, directions to pre-op,etc.).  Additionally, a handout was provided and discussed that stressed the importance of Honesty, Incentive Spirometry use, Ambulation, and Pain control.  This handout also explained the tubes in place during immediate post op recovery.  Verbal instructions given as well.     Patient and/or family verbalized understanding of education and reinforcement was provided as needed.    Instructed patient to take 325 mg the night before heart surgery as per CT surgeon's order.  Patient and/or family verbalized understanding.      bactroban given to patient and explained to use night before surgery - pt verbalize understanding.     Patient directed to Radiology Department for CXR after Pre Admission Testing Appointment. And for pft

## 2022-07-12 ENCOUNTER — ANESTHESIA EVENT CONVERTED (OUTPATIENT)
Dept: ANESTHESIOLOGY | Facility: HOSPITAL | Age: 62
End: 2022-07-12

## 2022-07-12 ENCOUNTER — HOSPITAL ENCOUNTER (INPATIENT)
Facility: HOSPITAL | Age: 62
LOS: 8 days | Discharge: HOME | End: 2022-07-20
Attending: THORACIC SURGERY (CARDIOTHORACIC VASCULAR SURGERY) | Admitting: THORACIC SURGERY (CARDIOTHORACIC VASCULAR SURGERY)

## 2022-07-12 ENCOUNTER — APPOINTMENT (OUTPATIENT)
Dept: GENERAL RADIOLOGY | Facility: HOSPITAL | Age: 62
End: 2022-07-12

## 2022-07-12 ENCOUNTER — ANESTHESIA (OUTPATIENT)
Dept: PERIOP | Facility: HOSPITAL | Age: 62
End: 2022-07-12

## 2022-07-12 DIAGNOSIS — I25.118 CORONARY ARTERY DISEASE OF NATIVE ARTERY OF NATIVE HEART WITH STABLE ANGINA PECTORIS: ICD-10-CM

## 2022-07-12 LAB
ABO GROUP BLD: NORMAL
ALBUMIN SERPL-MCNC: 2.8 G/DL (ref 3.5–5.2)
ALBUMIN SERPL-MCNC: 4.3 G/DL (ref 3.5–5.2)
ANION GAP SERPL CALCULATED.3IONS-SCNC: 10 MMOL/L (ref 5–15)
ANION GAP SERPL CALCULATED.3IONS-SCNC: 11 MMOL/L (ref 5–15)
APTT PPP: 30.6 SECONDS (ref 22–39)
ARTERIAL PATENCY WRIST A: ABNORMAL
ARTERIAL PATENCY WRIST A: ABNORMAL
ATMOSPHERIC PRESS: ABNORMAL MM[HG]
ATMOSPHERIC PRESS: ABNORMAL MM[HG]
BASE EXCESS BLDA CALC-SCNC: -2.1 MMOL/L (ref 0–2)
BASE EXCESS BLDA CALC-SCNC: 0.1 MMOL/L (ref 0–2)
BDY SITE: ABNORMAL
BDY SITE: ABNORMAL
BODY TEMPERATURE: 37 C
BODY TEMPERATURE: 37 C
BUN SERPL-MCNC: 10 MG/DL (ref 6–20)
BUN SERPL-MCNC: 11 MG/DL (ref 6–20)
BUN/CREAT SERPL: 13.7 (ref 7–25)
BUN/CREAT SERPL: 13.8 (ref 7–25)
CA-I SERPL ISE-MCNC: 1.34 MMOL/L (ref 1.12–1.32)
CALCIUM SPEC-SCNC: 8.1 MG/DL (ref 8.6–10.5)
CALCIUM SPEC-SCNC: 8.9 MG/DL (ref 8.6–10.5)
CHLORIDE SERPL-SCNC: 106 MMOL/L (ref 98–107)
CHLORIDE SERPL-SCNC: 107 MMOL/L (ref 98–107)
CO2 BLDA-SCNC: 26.4 MMOL/L (ref 22–33)
CO2 BLDA-SCNC: 26.4 MMOL/L (ref 22–33)
CO2 SERPL-SCNC: 22 MMOL/L (ref 22–29)
CO2 SERPL-SCNC: 25 MMOL/L (ref 22–29)
COHGB MFR BLD: 0.9 % (ref 0–2)
COHGB MFR BLD: 1.1 % (ref 0–2)
COTININE UR QL SCN: NEGATIVE NG/ML
CREAT SERPL-MCNC: 0.73 MG/DL (ref 0.76–1.27)
CREAT SERPL-MCNC: 0.8 MG/DL (ref 0.76–1.27)
DEPRECATED RDW RBC AUTO: 39.8 FL (ref 37–54)
DEPRECATED RDW RBC AUTO: 39.8 FL (ref 37–54)
EGFRCR SERPLBLD CKD-EPI 2021: 101.9 ML/MIN/1.73
EGFRCR SERPLBLD CKD-EPI 2021: 104.8 ML/MIN/1.73
EPAP: 0
EPAP: 0
ERYTHROCYTE [DISTWIDTH] IN BLOOD BY AUTOMATED COUNT: 12.6 % (ref 12.3–15.4)
ERYTHROCYTE [DISTWIDTH] IN BLOOD BY AUTOMATED COUNT: 12.9 % (ref 12.3–15.4)
GLUCOSE BLDC GLUCOMTR-MCNC: 126 MG/DL (ref 70–130)
GLUCOSE BLDC GLUCOMTR-MCNC: 139 MG/DL (ref 70–130)
GLUCOSE BLDC GLUCOMTR-MCNC: 151 MG/DL (ref 70–130)
GLUCOSE BLDC GLUCOMTR-MCNC: 158 MG/DL (ref 70–130)
GLUCOSE BLDC GLUCOMTR-MCNC: 163 MG/DL (ref 70–130)
GLUCOSE BLDC GLUCOMTR-MCNC: 164 MG/DL (ref 70–130)
GLUCOSE BLDC GLUCOMTR-MCNC: 164 MG/DL (ref 70–130)
GLUCOSE BLDC GLUCOMTR-MCNC: 192 MG/DL (ref 70–130)
GLUCOSE SERPL-MCNC: 130 MG/DL (ref 65–99)
GLUCOSE SERPL-MCNC: 149 MG/DL (ref 65–99)
HCO3 BLDA-SCNC: 24.8 MMOL/L (ref 20–26)
HCO3 BLDA-SCNC: 25.1 MMOL/L (ref 20–26)
HCT VFR BLD AUTO: 26.5 % (ref 37.5–51)
HCT VFR BLD AUTO: 28.5 % (ref 37.5–51)
HCT VFR BLD CALC: 27.4 % (ref 38–51)
HCT VFR BLD CALC: 36.7 % (ref 38–51)
HGB BLD-MCNC: 10.1 G/DL (ref 13–17.7)
HGB BLD-MCNC: 9.5 G/DL (ref 13–17.7)
HGB BLDA-MCNC: 12 G/DL (ref 13.5–17.5)
HGB BLDA-MCNC: 9 G/DL (ref 13.5–17.5)
INHALED O2 CONCENTRATION: 100 %
INHALED O2 CONCENTRATION: 40 %
INR PPP: 1.44 (ref 0.84–1.13)
IPAP: 0
IPAP: 0
Lab: NORMAL
MAGNESIUM SERPL-MCNC: 1.6 MG/DL (ref 1.6–2.6)
MAGNESIUM SERPL-MCNC: 2.4 MG/DL (ref 1.6–2.6)
MCH RBC QN AUTO: 30.6 PG (ref 26.6–33)
MCH RBC QN AUTO: 30.7 PG (ref 26.6–33)
MCHC RBC AUTO-ENTMCNC: 35.4 G/DL (ref 31.5–35.7)
MCHC RBC AUTO-ENTMCNC: 35.8 G/DL (ref 31.5–35.7)
MCV RBC AUTO: 85.8 FL (ref 79–97)
MCV RBC AUTO: 86.4 FL (ref 79–97)
METHGB BLD QL: 0.6 % (ref 0–1.5)
METHGB BLD QL: 0.6 % (ref 0–1.5)
MODALITY: ABNORMAL
MODALITY: ABNORMAL
NOTE: ABNORMAL
NOTE: ABNORMAL
OXYHGB MFR BLDV: 93.1 % (ref 94–99)
OXYHGB MFR BLDV: 98.5 % (ref 94–99)
PAW @ PEAK INSP FLOW SETTING VENT: 0 CMH2O
PAW @ PEAK INSP FLOW SETTING VENT: 0 CMH2O
PCO2 BLDA: 41.2 MM HG (ref 35–45)
PCO2 BLDA: 52.4 MM HG (ref 35–45)
PCO2 TEMP ADJ BLD: 41.2 MM HG (ref 35–48)
PCO2 TEMP ADJ BLD: 52.4 MM HG (ref 35–48)
PEEP RESPIRATORY: 5 CM[H2O]
PH BLDA: 7.28 PH UNITS (ref 7.35–7.45)
PH BLDA: 7.39 PH UNITS (ref 7.35–7.45)
PH, TEMP CORRECTED: 7.28 PH UNITS
PH, TEMP CORRECTED: 7.39 PH UNITS
PHOSPHATE SERPL-MCNC: 2.5 MG/DL (ref 2.5–4.5)
PHOSPHATE SERPL-MCNC: 2.8 MG/DL (ref 2.5–4.5)
PLATELET # BLD AUTO: 120 10*3/MM3 (ref 140–450)
PLATELET # BLD AUTO: 134 10*3/MM3 (ref 140–450)
PMV BLD AUTO: 10.4 FL (ref 6–12)
PMV BLD AUTO: 10.5 FL (ref 6–12)
PO2 BLDA: 226 MM HG (ref 83–108)
PO2 BLDA: 75.8 MM HG (ref 83–108)
PO2 TEMP ADJ BLD: 226 MM HG (ref 83–108)
PO2 TEMP ADJ BLD: 75.8 MM HG (ref 83–108)
POTASSIUM SERPL-SCNC: 3.6 MMOL/L (ref 3.5–5.2)
POTASSIUM SERPL-SCNC: 3.6 MMOL/L (ref 3.5–5.2)
POTASSIUM SERPL-SCNC: 3.7 MMOL/L (ref 3.5–5.2)
PROTHROMBIN TIME: 17.5 SECONDS (ref 11.4–14.4)
RBC # BLD AUTO: 3.09 10*6/MM3 (ref 4.14–5.8)
RBC # BLD AUTO: 3.3 10*6/MM3 (ref 4.14–5.8)
RH BLD: POSITIVE
SODIUM SERPL-SCNC: 140 MMOL/L (ref 136–145)
SODIUM SERPL-SCNC: 141 MMOL/L (ref 136–145)
TOTAL RATE: 0 BREATHS/MINUTE
TOTAL RATE: 0 BREATHS/MINUTE
WBC NRBC COR # BLD: 8.26 10*3/MM3 (ref 3.4–10.8)
WBC NRBC COR # BLD: 8.69 10*3/MM3 (ref 3.4–10.8)

## 2022-07-12 PROCEDURE — P9041 ALBUMIN (HUMAN),5%, 50ML: HCPCS | Performed by: PHYSICIAN ASSISTANT

## 2022-07-12 PROCEDURE — 25010000002 ALBUMIN HUMAN 5% PER 50 ML

## 2022-07-12 PROCEDURE — 33533 CABG ARTERIAL SINGLE: CPT | Performed by: PHYSICIAN ASSISTANT

## 2022-07-12 PROCEDURE — 71045 X-RAY EXAM CHEST 1 VIEW: CPT

## 2022-07-12 PROCEDURE — 25010000002 PROTAMINE SULFATE PER 10 MG: Performed by: ANESTHESIOLOGY

## 2022-07-12 PROCEDURE — 85730 THROMBOPLASTIN TIME PARTIAL: CPT | Performed by: PHYSICIAN ASSISTANT

## 2022-07-12 PROCEDURE — 94002 VENT MGMT INPAT INIT DAY: CPT

## 2022-07-12 PROCEDURE — P9037 PLATE PHERES LEUKOREDU IRRAD: HCPCS

## 2022-07-12 PROCEDURE — 33521 CABG ARTERY-VEIN FOUR: CPT | Performed by: THORACIC SURGERY (CARDIOTHORACIC VASCULAR SURGERY)

## 2022-07-12 PROCEDURE — 94799 UNLISTED PULMONARY SVC/PX: CPT

## 2022-07-12 PROCEDURE — 86900 BLOOD TYPING SEROLOGIC ABO: CPT

## 2022-07-12 PROCEDURE — P9035 PLATELET PHERES LEUKOREDUCED: HCPCS

## 2022-07-12 PROCEDURE — 25010000002 CEFAZOLIN PER 500 MG: Performed by: THORACIC SURGERY (CARDIOTHORACIC VASCULAR SURGERY)

## 2022-07-12 PROCEDURE — 5A1221Z PERFORMANCE OF CARDIAC OUTPUT, CONTINUOUS: ICD-10-PCS | Performed by: THORACIC SURGERY (CARDIOTHORACIC VASCULAR SURGERY)

## 2022-07-12 PROCEDURE — 25010000002 VANCOMYCIN 1 G RECONSTITUTED SOLUTION: Performed by: ANESTHESIOLOGY

## 2022-07-12 PROCEDURE — 99233 SBSQ HOSP IP/OBS HIGH 50: CPT | Performed by: INTERNAL MEDICINE

## 2022-07-12 PROCEDURE — 021309W BYPASS CORONARY ARTERY, FOUR OR MORE ARTERIES FROM AORTA WITH AUTOLOGOUS VENOUS TISSUE, OPEN APPROACH: ICD-10-PCS | Performed by: THORACIC SURGERY (CARDIOTHORACIC VASCULAR SURGERY)

## 2022-07-12 PROCEDURE — 85347 COAGULATION TIME ACTIVATED: CPT

## 2022-07-12 PROCEDURE — 82803 BLOOD GASES ANY COMBINATION: CPT

## 2022-07-12 PROCEDURE — 25010000002 HEPARIN (PORCINE) PER 1000 UNITS: Performed by: ANESTHESIOLOGY

## 2022-07-12 PROCEDURE — 83050 HGB METHEMOGLOBIN QUAN: CPT

## 2022-07-12 PROCEDURE — 36430 TRANSFUSION BLD/BLD COMPNT: CPT

## 2022-07-12 PROCEDURE — 33533 CABG ARTERIAL SINGLE: CPT | Performed by: THORACIC SURGERY (CARDIOTHORACIC VASCULAR SURGERY)

## 2022-07-12 PROCEDURE — 85027 COMPLETE CBC AUTOMATED: CPT | Performed by: PHYSICIAN ASSISTANT

## 2022-07-12 PROCEDURE — 82330 ASSAY OF CALCIUM: CPT | Performed by: PHYSICIAN ASSISTANT

## 2022-07-12 PROCEDURE — 25010000002 ALBUMIN HUMAN 5% PER 50 ML: Performed by: THORACIC SURGERY (CARDIOTHORACIC VASCULAR SURGERY)

## 2022-07-12 PROCEDURE — 33521 CABG ARTERY-VEIN FOUR: CPT | Performed by: PHYSICIAN ASSISTANT

## 2022-07-12 PROCEDURE — 33268 EXCL LAA OPN OTH PX ANY METH: CPT | Performed by: PHYSICIAN ASSISTANT

## 2022-07-12 PROCEDURE — 25010000002 GENTAMICIN PER 80 MG: Performed by: THORACIC SURGERY (CARDIOTHORACIC VASCULAR SURGERY)

## 2022-07-12 PROCEDURE — 84132 ASSAY OF SERUM POTASSIUM: CPT | Performed by: ANESTHESIOLOGY

## 2022-07-12 PROCEDURE — 93005 ELECTROCARDIOGRAM TRACING: CPT | Performed by: PHYSICIAN ASSISTANT

## 2022-07-12 PROCEDURE — 82805 BLOOD GASES W/O2 SATURATION: CPT

## 2022-07-12 PROCEDURE — 86901 BLOOD TYPING SEROLOGIC RH(D): CPT

## 2022-07-12 PROCEDURE — 25010000002 PROTAMINE SULFATE PER 10 MG: Performed by: PHYSICIAN ASSISTANT

## 2022-07-12 PROCEDURE — 82330 ASSAY OF CALCIUM: CPT

## 2022-07-12 PROCEDURE — 25010000002 AMIODARONE IN DEXTROSE 5% 360-4.14 MG/200ML-% SOLUTION: Performed by: ANESTHESIOLOGY

## 2022-07-12 PROCEDURE — 25010000002 MIDAZOLAM PER 1 MG: Performed by: ANESTHESIOLOGY

## 2022-07-12 PROCEDURE — 80069 RENAL FUNCTION PANEL: CPT | Performed by: PHYSICIAN ASSISTANT

## 2022-07-12 PROCEDURE — 25010000002 FENTANYL CITRATE (PF) 50 MCG/ML SOLUTION: Performed by: THORACIC SURGERY (CARDIOTHORACIC VASCULAR SURGERY)

## 2022-07-12 PROCEDURE — 25810000003 DEXTROSE 5 % WITH KCL 20 MEQ 20-5 MEQ/L-% SOLUTION: Performed by: PHYSICIAN ASSISTANT

## 2022-07-12 PROCEDURE — 25010000002 AMIODARONE IN DEXTROSE 5% 360-4.14 MG/200ML-% SOLUTION: Performed by: THORACIC SURGERY (CARDIOTHORACIC VASCULAR SURGERY)

## 2022-07-12 PROCEDURE — 82375 ASSAY CARBOXYHB QUANT: CPT

## 2022-07-12 PROCEDURE — 85014 HEMATOCRIT: CPT

## 2022-07-12 PROCEDURE — 82947 ASSAY GLUCOSE BLOOD QUANT: CPT

## 2022-07-12 PROCEDURE — 02100Z9 BYPASS CORONARY ARTERY, ONE ARTERY FROM LEFT INTERNAL MAMMARY, OPEN APPROACH: ICD-10-PCS | Performed by: THORACIC SURGERY (CARDIOTHORACIC VASCULAR SURGERY)

## 2022-07-12 PROCEDURE — 83735 ASSAY OF MAGNESIUM: CPT | Performed by: PHYSICIAN ASSISTANT

## 2022-07-12 PROCEDURE — P9041 ALBUMIN (HUMAN),5%, 50ML: HCPCS | Performed by: THORACIC SURGERY (CARDIOTHORACIC VASCULAR SURGERY)

## 2022-07-12 PROCEDURE — 33508 ENDOSCOPIC VEIN HARVEST: CPT | Performed by: PHYSICIAN ASSISTANT

## 2022-07-12 PROCEDURE — P9041 ALBUMIN (HUMAN),5%, 50ML: HCPCS

## 2022-07-12 PROCEDURE — 25010000002 PAPAVERINE PER 60 MG: Performed by: THORACIC SURGERY (CARDIOTHORACIC VASCULAR SURGERY)

## 2022-07-12 PROCEDURE — 33508 ENDOSCOPIC VEIN HARVEST: CPT | Performed by: THORACIC SURGERY (CARDIOTHORACIC VASCULAR SURGERY)

## 2022-07-12 PROCEDURE — 33268 EXCL LAA OPN OTH PX ANY METH: CPT | Performed by: THORACIC SURGERY (CARDIOTHORACIC VASCULAR SURGERY)

## 2022-07-12 PROCEDURE — 02L70CK OCCLUSION OF LEFT ATRIAL APPENDAGE WITH EXTRALUMINAL DEVICE, OPEN APPROACH: ICD-10-PCS | Performed by: THORACIC SURGERY (CARDIOTHORACIC VASCULAR SURGERY)

## 2022-07-12 PROCEDURE — 84295 ASSAY OF SERUM SODIUM: CPT

## 2022-07-12 PROCEDURE — 06BP4ZZ EXCISION OF RIGHT SAPHENOUS VEIN, PERCUTANEOUS ENDOSCOPIC APPROACH: ICD-10-PCS | Performed by: THORACIC SURGERY (CARDIOTHORACIC VASCULAR SURGERY)

## 2022-07-12 PROCEDURE — P9016 RBC LEUKOCYTES REDUCED: HCPCS

## 2022-07-12 PROCEDURE — C1894 INTRO/SHEATH, NON-LASER: HCPCS | Performed by: THORACIC SURGERY (CARDIOTHORACIC VASCULAR SURGERY)

## 2022-07-12 PROCEDURE — 25010000002 ALBUMIN HUMAN 5% PER 50 ML: Performed by: PHYSICIAN ASSISTANT

## 2022-07-12 PROCEDURE — 0 CEFUROXIME SODIUM 1.5 G RECONSTITUTED SOLUTION: Performed by: THORACIC SURGERY (CARDIOTHORACIC VASCULAR SURGERY)

## 2022-07-12 PROCEDURE — 25010000002 MORPHINE PER 10 MG: Performed by: THORACIC SURGERY (CARDIOTHORACIC VASCULAR SURGERY)

## 2022-07-12 PROCEDURE — 93010 ELECTROCARDIOGRAM REPORT: CPT | Performed by: INTERNAL MEDICINE

## 2022-07-12 PROCEDURE — P9100 PATHOGEN TEST FOR PLATELETS: HCPCS

## 2022-07-12 PROCEDURE — 25010000002 VANCOMYCIN 10 G RECONSTITUTED SOLUTION: Performed by: THORACIC SURGERY (CARDIOTHORACIC VASCULAR SURGERY)

## 2022-07-12 PROCEDURE — 85610 PROTHROMBIN TIME: CPT | Performed by: PHYSICIAN ASSISTANT

## 2022-07-12 PROCEDURE — 0 MAGNESIUM SULFATE 4 GM/100ML SOLUTION

## 2022-07-12 PROCEDURE — C1751 CATH, INF, PER/CENT/MIDLINE: HCPCS | Performed by: ANESTHESIOLOGY

## 2022-07-12 PROCEDURE — 0 CEFUROXIME SODIUM 1.5 G RECONSTITUTED SOLUTION

## 2022-07-12 PROCEDURE — 84132 ASSAY OF SERUM POTASSIUM: CPT

## 2022-07-12 DEVICE — ATRICLIP® FLEX GILINOV-COSGROVE LAA EXCLUSION SYSTEM 35
Type: IMPLANTABLE DEVICE | Site: HEART | Status: FUNCTIONAL
Brand: ATRICLIP™ LAA EXCLUSION SYSTEM

## 2022-07-12 DEVICE — SEALANT HEMO TACHOSIL FIBRIN PTCH 9.5X4.8CM: Type: IMPLANTABLE DEVICE | Site: CHEST | Status: FUNCTIONAL

## 2022-07-12 DEVICE — LIGACLIP MCA MULTIPLE CLIP APPLIERS, 20 SMALL CLIPS
Type: IMPLANTABLE DEVICE | Site: LEG | Status: FUNCTIONAL
Brand: LIGACLIP

## 2022-07-12 RX ORDER — CHLORHEXIDINE GLUCONATE 500 MG/1
1 CLOTH TOPICAL EVERY 12 HOURS PRN
Status: DISCONTINUED | OUTPATIENT
Start: 2022-07-12 | End: 2022-07-12

## 2022-07-12 RX ORDER — CHLORHEXIDINE GLUCONATE 0.12 MG/ML
15 RINSE ORAL EVERY 12 HOURS SCHEDULED
Status: DISCONTINUED | OUTPATIENT
Start: 2022-07-12 | End: 2022-07-15

## 2022-07-12 RX ORDER — POTASSIUM CHLORIDE 29.8 MG/ML
20 INJECTION INTRAVENOUS
Status: DISCONTINUED | OUTPATIENT
Start: 2022-07-12 | End: 2022-07-18

## 2022-07-12 RX ORDER — MORPHINE SULFATE 2 MG/ML
2 INJECTION, SOLUTION INTRAMUSCULAR; INTRAVENOUS
Status: DISCONTINUED | OUTPATIENT
Start: 2022-07-12 | End: 2022-07-18

## 2022-07-12 RX ORDER — ACETAMINOPHEN 500 MG
1000 TABLET ORAL ONCE
Status: COMPLETED | OUTPATIENT
Start: 2022-07-12 | End: 2022-07-12

## 2022-07-12 RX ORDER — AMINOCAPROIC ACID 250 MG/ML
INJECTION, SOLUTION INTRAVENOUS AS NEEDED
Status: DISCONTINUED | OUTPATIENT
Start: 2022-07-12 | End: 2022-07-12 | Stop reason: SURG

## 2022-07-12 RX ORDER — ASPIRIN 325 MG
325 TABLET ORAL ONCE
Status: COMPLETED | OUTPATIENT
Start: 2022-07-12 | End: 2022-07-12

## 2022-07-12 RX ORDER — METOPROLOL TARTRATE 5 MG/5ML
2.5 INJECTION INTRAVENOUS EVERY 6 HOURS SCHEDULED
Status: DISPENSED | OUTPATIENT
Start: 2022-07-12 | End: 2022-07-13

## 2022-07-12 RX ORDER — PROTAMINE SULFATE 10 MG/ML
INJECTION, SOLUTION INTRAVENOUS
Status: ACTIVE
Start: 2022-07-12 | End: 2022-07-13

## 2022-07-12 RX ORDER — PAPAVERINE HYDROCHLORIDE 30 MG/ML
INJECTION INTRAMUSCULAR; INTRAVENOUS AS NEEDED
Status: DISCONTINUED | OUTPATIENT
Start: 2022-07-12 | End: 2022-07-12 | Stop reason: HOSPADM

## 2022-07-12 RX ORDER — ROCURONIUM BROMIDE 10 MG/ML
INJECTION, SOLUTION INTRAVENOUS AS NEEDED
Status: DISCONTINUED | OUTPATIENT
Start: 2022-07-12 | End: 2022-07-12 | Stop reason: SURG

## 2022-07-12 RX ORDER — ACETAMINOPHEN 325 MG/1
650 TABLET ORAL EVERY 8 HOURS
Status: DISCONTINUED | OUTPATIENT
Start: 2022-07-12 | End: 2022-07-20 | Stop reason: HOSPADM

## 2022-07-12 RX ORDER — ATORVASTATIN CALCIUM 40 MG/1
40 TABLET, FILM COATED ORAL NIGHTLY
Status: DISCONTINUED | OUTPATIENT
Start: 2022-07-12 | End: 2022-07-20 | Stop reason: HOSPADM

## 2022-07-12 RX ORDER — ETOMIDATE 2 MG/ML
INJECTION INTRAVENOUS AS NEEDED
Status: DISCONTINUED | OUTPATIENT
Start: 2022-07-12 | End: 2022-07-12 | Stop reason: SURG

## 2022-07-12 RX ORDER — MEPERIDINE HYDROCHLORIDE 25 MG/ML
25 INJECTION INTRAMUSCULAR; INTRAVENOUS; SUBCUTANEOUS EVERY 4 HOURS PRN
Status: ACTIVE | OUTPATIENT
Start: 2022-07-12 | End: 2022-07-13

## 2022-07-12 RX ORDER — DEXMEDETOMIDINE HYDROCHLORIDE 4 UG/ML
.2-1.5 INJECTION, SOLUTION INTRAVENOUS CONTINUOUS PRN
Status: DISCONTINUED | OUTPATIENT
Start: 2022-07-12 | End: 2022-07-13

## 2022-07-12 RX ORDER — NITROGLYCERIN 20 MG/100ML
INJECTION INTRAVENOUS CONTINUOUS PRN
Status: DISCONTINUED | OUTPATIENT
Start: 2022-07-12 | End: 2022-07-12 | Stop reason: SURG

## 2022-07-12 RX ORDER — SODIUM CHLORIDE 9 MG/ML
INJECTION, SOLUTION INTRAVENOUS AS NEEDED
Status: DISCONTINUED | OUTPATIENT
Start: 2022-07-12 | End: 2022-07-12 | Stop reason: HOSPADM

## 2022-07-12 RX ORDER — NICOTINE POLACRILEX 4 MG
15 LOZENGE BUCCAL
Status: DISCONTINUED | OUTPATIENT
Start: 2022-07-12 | End: 2022-07-13

## 2022-07-12 RX ORDER — THROMBIN HUMAN AND FIBRINOGEN 2; 5.5 [USP'U]/1; MG/1
PATCH TOPICAL AS NEEDED
Status: DISCONTINUED | OUTPATIENT
Start: 2022-07-12 | End: 2022-07-12 | Stop reason: HOSPADM

## 2022-07-12 RX ORDER — NALOXONE HCL 0.4 MG/ML
0.4 VIAL (ML) INJECTION
Status: DISCONTINUED | OUTPATIENT
Start: 2022-07-12 | End: 2022-07-18

## 2022-07-12 RX ORDER — GABAPENTIN 100 MG/1
100 CAPSULE ORAL 3 TIMES DAILY
Status: DISCONTINUED | OUTPATIENT
Start: 2022-07-12 | End: 2022-07-18

## 2022-07-12 RX ORDER — DEXTROSE MONOHYDRATE 25 G/50ML
10-50 INJECTION, SOLUTION INTRAVENOUS
Status: DISCONTINUED | OUTPATIENT
Start: 2022-07-12 | End: 2022-07-13

## 2022-07-12 RX ORDER — POTASSIUM CHLORIDE 1.5 G/1.77G
40 POWDER, FOR SOLUTION ORAL AS NEEDED
Status: DISCONTINUED | OUTPATIENT
Start: 2022-07-12 | End: 2022-07-20 | Stop reason: HOSPADM

## 2022-07-12 RX ORDER — PROTAMINE SULFATE 10 MG/ML
INJECTION, SOLUTION INTRAVENOUS AS NEEDED
Status: DISCONTINUED | OUTPATIENT
Start: 2022-07-12 | End: 2022-07-12 | Stop reason: SURG

## 2022-07-12 RX ORDER — POTASSIUM CHLORIDE 750 MG/1
40 CAPSULE, EXTENDED RELEASE ORAL AS NEEDED
Status: DISCONTINUED | OUTPATIENT
Start: 2022-07-12 | End: 2022-07-20 | Stop reason: HOSPADM

## 2022-07-12 RX ORDER — ONDANSETRON 2 MG/ML
4 INJECTION INTRAMUSCULAR; INTRAVENOUS EVERY 6 HOURS PRN
Status: DISCONTINUED | OUTPATIENT
Start: 2022-07-12 | End: 2022-07-20 | Stop reason: HOSPADM

## 2022-07-12 RX ORDER — NOREPINEPHRINE BIT/0.9 % NACL 8 MG/250ML
.02-.3 INFUSION BOTTLE (ML) INTRAVENOUS CONTINUOUS PRN
Status: DISCONTINUED | OUTPATIENT
Start: 2022-07-12 | End: 2022-07-18

## 2022-07-12 RX ORDER — MAGNESIUM SULFATE HEPTAHYDRATE 40 MG/ML
4 INJECTION, SOLUTION INTRAVENOUS AS NEEDED
Status: DISCONTINUED | OUTPATIENT
Start: 2022-07-12 | End: 2022-07-20 | Stop reason: HOSPADM

## 2022-07-12 RX ORDER — ALBUMIN, HUMAN INJ 5% 5 %
SOLUTION INTRAVENOUS
Status: COMPLETED
Start: 2022-07-12 | End: 2022-07-12

## 2022-07-12 RX ORDER — ALBUMIN, HUMAN INJ 5% 5 %
500 SOLUTION INTRAVENOUS AS NEEDED
Status: COMPLETED | OUTPATIENT
Start: 2022-07-12 | End: 2022-07-12

## 2022-07-12 RX ORDER — CHLORHEXIDINE GLUCONATE 0.12 MG/ML
15 RINSE ORAL ONCE
Status: COMPLETED | OUTPATIENT
Start: 2022-07-12 | End: 2022-07-12

## 2022-07-12 RX ORDER — MAGNESIUM SULFATE HEPTAHYDRATE 40 MG/ML
2 INJECTION, SOLUTION INTRAVENOUS AS NEEDED
Status: DISCONTINUED | OUTPATIENT
Start: 2022-07-12 | End: 2022-07-20 | Stop reason: HOSPADM

## 2022-07-12 RX ORDER — DOBUTAMINE HYDROCHLORIDE 100 MG/100ML
2-20 INJECTION INTRAVENOUS CONTINUOUS PRN
Status: DISCONTINUED | OUTPATIENT
Start: 2022-07-12 | End: 2022-07-13

## 2022-07-12 RX ORDER — SUFENTANIL CITRATE 50 UG/ML
INJECTION EPIDURAL; INTRAVENOUS AS NEEDED
Status: DISCONTINUED | OUTPATIENT
Start: 2022-07-12 | End: 2022-07-12 | Stop reason: SURG

## 2022-07-12 RX ORDER — OXYCODONE HYDROCHLORIDE AND ACETAMINOPHEN 5; 325 MG/1; MG/1
2 TABLET ORAL EVERY 4 HOURS PRN
Status: DISCONTINUED | OUTPATIENT
Start: 2022-07-12 | End: 2022-07-20 | Stop reason: HOSPADM

## 2022-07-12 RX ORDER — ASPIRIN 325 MG
325 TABLET ORAL NIGHTLY
Status: DISCONTINUED | OUTPATIENT
Start: 2022-07-12 | End: 2022-07-12 | Stop reason: SDUPTHER

## 2022-07-12 RX ORDER — DOPAMINE HYDROCHLORIDE 160 MG/100ML
2-20 INJECTION, SOLUTION INTRAVENOUS CONTINUOUS PRN
Status: DISCONTINUED | OUTPATIENT
Start: 2022-07-12 | End: 2022-07-13

## 2022-07-12 RX ORDER — HYDROCODONE BITARTRATE AND ACETAMINOPHEN 7.5; 325 MG/1; MG/1
1 TABLET ORAL EVERY 6 HOURS PRN
Status: DISPENSED | OUTPATIENT
Start: 2022-07-12 | End: 2022-07-19

## 2022-07-12 RX ORDER — LIDOCAINE HYDROCHLORIDE 10 MG/ML
0.5 INJECTION, SOLUTION EPIDURAL; INFILTRATION; INTRACAUDAL; PERINEURAL ONCE AS NEEDED
Status: COMPLETED | OUTPATIENT
Start: 2022-07-12 | End: 2022-07-12

## 2022-07-12 RX ORDER — PROTAMINE SULFATE 10 MG/ML
50 INJECTION, SOLUTION INTRAVENOUS ONCE
Status: COMPLETED | OUTPATIENT
Start: 2022-07-12 | End: 2022-07-12

## 2022-07-12 RX ORDER — SODIUM CHLORIDE, SODIUM LACTATE, POTASSIUM CHLORIDE, CALCIUM CHLORIDE 600; 310; 30; 20 MG/100ML; MG/100ML; MG/100ML; MG/100ML
9 INJECTION, SOLUTION INTRAVENOUS CONTINUOUS
Status: DISCONTINUED | OUTPATIENT
Start: 2022-07-12 | End: 2022-07-12

## 2022-07-12 RX ORDER — FAMOTIDINE 20 MG/1
20 TABLET, FILM COATED ORAL ONCE
Status: DISCONTINUED | OUTPATIENT
Start: 2022-07-12 | End: 2022-07-12 | Stop reason: HOSPADM

## 2022-07-12 RX ORDER — ALBUMIN, HUMAN INJ 5% 5 %
500 SOLUTION INTRAVENOUS ONCE
Status: COMPLETED | OUTPATIENT
Start: 2022-07-12 | End: 2022-07-12

## 2022-07-12 RX ORDER — GABAPENTIN 300 MG/1
300 CAPSULE ORAL ONCE
Status: COMPLETED | OUTPATIENT
Start: 2022-07-12 | End: 2022-07-12

## 2022-07-12 RX ORDER — FENTANYL CITRATE 50 UG/ML
50 INJECTION, SOLUTION INTRAMUSCULAR; INTRAVENOUS
Status: DISCONTINUED | OUTPATIENT
Start: 2022-07-12 | End: 2022-07-12

## 2022-07-12 RX ORDER — FENTANYL CITRATE 50 UG/ML
25 INJECTION, SOLUTION INTRAMUSCULAR; INTRAVENOUS
Status: DISCONTINUED | OUTPATIENT
Start: 2022-07-12 | End: 2022-07-18

## 2022-07-12 RX ORDER — ALBUTEROL SULFATE 2.5 MG/3ML
2.5 SOLUTION RESPIRATORY (INHALATION) EVERY 4 HOURS PRN
Status: ACTIVE | OUTPATIENT
Start: 2022-07-12 | End: 2022-07-13

## 2022-07-12 RX ORDER — LIDOCAINE HYDROCHLORIDE 20 MG/ML
INJECTION, SOLUTION INFILTRATION; PERINEURAL AS NEEDED
Status: DISCONTINUED | OUTPATIENT
Start: 2022-07-12 | End: 2022-07-12 | Stop reason: SURG

## 2022-07-12 RX ORDER — ASPIRIN 325 MG
325 TABLET, DELAYED RELEASE (ENTERIC COATED) ORAL DAILY
Status: DISCONTINUED | OUTPATIENT
Start: 2022-07-13 | End: 2022-07-20 | Stop reason: HOSPADM

## 2022-07-12 RX ORDER — HYDROMORPHONE HYDROCHLORIDE 1 MG/ML
0.5 INJECTION, SOLUTION INTRAMUSCULAR; INTRAVENOUS; SUBCUTANEOUS
Status: DISCONTINUED | OUTPATIENT
Start: 2022-07-12 | End: 2022-07-12

## 2022-07-12 RX ORDER — NITROGLYCERIN 20 MG/100ML
5-200 INJECTION INTRAVENOUS CONTINUOUS PRN
Status: DISCONTINUED | OUTPATIENT
Start: 2022-07-12 | End: 2022-07-13

## 2022-07-12 RX ORDER — VANCOMYCIN HYDROCHLORIDE 1 G/20ML
INJECTION, POWDER, LYOPHILIZED, FOR SOLUTION INTRAVENOUS AS NEEDED
Status: DISCONTINUED | OUTPATIENT
Start: 2022-07-12 | End: 2022-07-12 | Stop reason: SURG

## 2022-07-12 RX ORDER — POTASSIUM CHLORIDE, DEXTROSE MONOHYDRATE 150; 5 MG/100ML; G/100ML
30 INJECTION, SOLUTION INTRAVENOUS CONTINUOUS
Status: DISCONTINUED | OUTPATIENT
Start: 2022-07-12 | End: 2022-07-18

## 2022-07-12 RX ORDER — MIDAZOLAM HYDROCHLORIDE 1 MG/ML
INJECTION INTRAMUSCULAR; INTRAVENOUS AS NEEDED
Status: DISCONTINUED | OUTPATIENT
Start: 2022-07-12 | End: 2022-07-12 | Stop reason: SURG

## 2022-07-12 RX ORDER — MIDAZOLAM HYDROCHLORIDE 1 MG/ML
1 INJECTION INTRAMUSCULAR; INTRAVENOUS
Status: DISCONTINUED | OUTPATIENT
Start: 2022-07-12 | End: 2022-07-12 | Stop reason: HOSPADM

## 2022-07-12 RX ORDER — NITROGLYCERIN 0.4 MG/1
0.4 TABLET SUBLINGUAL
Status: DISCONTINUED | OUTPATIENT
Start: 2022-07-12 | End: 2022-07-12 | Stop reason: HOSPADM

## 2022-07-12 RX ORDER — CALCIUM CHLORIDE 100 MG/ML
INJECTION INTRAVENOUS; INTRAVENTRICULAR AS NEEDED
Status: DISCONTINUED | OUTPATIENT
Start: 2022-07-12 | End: 2022-07-12 | Stop reason: SURG

## 2022-07-12 RX ORDER — HEPARIN SODIUM 1000 [USP'U]/ML
INJECTION, SOLUTION INTRAVENOUS; SUBCUTANEOUS AS NEEDED
Status: DISCONTINUED | OUTPATIENT
Start: 2022-07-12 | End: 2022-07-12 | Stop reason: SURG

## 2022-07-12 RX ORDER — CHLORHEXIDINE GLUCONATE 500 MG/1
1 CLOTH TOPICAL EVERY 12 HOURS PRN
Status: DISCONTINUED | OUTPATIENT
Start: 2022-07-12 | End: 2022-07-12 | Stop reason: HOSPADM

## 2022-07-12 RX ADMIN — PROTAMINE SULFATE 50 MG: 10 INJECTION, SOLUTION INTRAVENOUS at 16:07

## 2022-07-12 RX ADMIN — CHLORHEXIDINE GLUCONATE 0.12% ORAL RINSE 15 ML: 1.2 LIQUID ORAL at 10:00

## 2022-07-12 RX ADMIN — VANCOMYCIN HYDROCHLORIDE 1.5 G: 1 INJECTION, POWDER, LYOPHILIZED, FOR SOLUTION INTRAVENOUS at 11:14

## 2022-07-12 RX ADMIN — DEXMEDETOMIDINE HYDROCHLORIDE 1.5 MCG/KG/HR: 4 INJECTION, SOLUTION INTRAVENOUS at 17:24

## 2022-07-12 RX ADMIN — INSULIN HUMAN 2 UNITS/HR: 1 INJECTION, SOLUTION INTRAVENOUS at 16:24

## 2022-07-12 RX ADMIN — FENTANYL CITRATE 25 MCG: 50 INJECTION, SOLUTION INTRAMUSCULAR; INTRAVENOUS at 16:07

## 2022-07-12 RX ADMIN — GABAPENTIN 100 MG: 100 CAPSULE ORAL at 20:01

## 2022-07-12 RX ADMIN — HYDROCODONE BITARTRATE AND ACETAMINOPHEN 1 TABLET: 7.5; 325 TABLET ORAL at 19:43

## 2022-07-12 RX ADMIN — AMIODARONE HYDROCHLORIDE 0.5 MG/MIN: 1.8 INJECTION, SOLUTION INTRAVENOUS at 20:34

## 2022-07-12 RX ADMIN — ROCURONIUM BROMIDE 50 MG: 10 INJECTION, SOLUTION INTRAVENOUS at 14:26

## 2022-07-12 RX ADMIN — GABAPENTIN 300 MG: 300 CAPSULE ORAL at 09:59

## 2022-07-12 RX ADMIN — ROCURONIUM BROMIDE 20 MG: 10 INJECTION, SOLUTION INTRAVENOUS at 11:49

## 2022-07-12 RX ADMIN — DEXMEDETOMIDINE HYDROCHLORIDE 1.5 MCG/KG/HR: 4 INJECTION, SOLUTION INTRAVENOUS at 19:43

## 2022-07-12 RX ADMIN — MUPIROCIN 1 APPLICATION: 20 OINTMENT TOPICAL at 10:00

## 2022-07-12 RX ADMIN — AMIODARONE HYDROCHLORIDE 33 MCG/MIN: 1.8 INJECTION, SOLUTION INTRAVENOUS at 14:20

## 2022-07-12 RX ADMIN — MIDAZOLAM 2 MG: 1 INJECTION INTRAMUSCULAR; INTRAVENOUS at 10:54

## 2022-07-12 RX ADMIN — CHLORHEXIDINE GLUCONATE 0.12% ORAL RINSE 15 ML: 1.2 LIQUID ORAL at 19:45

## 2022-07-12 RX ADMIN — CALCIUM CHLORIDE 1 G: 100 INJECTION INTRAVENOUS; INTRAVENTRICULAR at 14:32

## 2022-07-12 RX ADMIN — AMINOCAPROIC ACID 10 G: 250 INJECTION, SOLUTION INTRAVENOUS at 14:43

## 2022-07-12 RX ADMIN — ROCURONIUM BROMIDE 50 MG: 10 INJECTION, SOLUTION INTRAVENOUS at 12:26

## 2022-07-12 RX ADMIN — OXYCODONE HYDROCHLORIDE AND ACETAMINOPHEN 2 TABLET: 5; 325 TABLET ORAL at 17:25

## 2022-07-12 RX ADMIN — SUFENTANIL CITRATE 100 MCG: 50 INJECTION EPIDURAL; INTRAVENOUS at 11:15

## 2022-07-12 RX ADMIN — MORPHINE SULFATE 2 MG: 2 INJECTION, SOLUTION INTRAMUSCULAR; INTRAVENOUS at 22:04

## 2022-07-12 RX ADMIN — ALBUMIN HUMAN 500 ML: 0.05 INJECTION, SOLUTION INTRAVENOUS at 16:09

## 2022-07-12 RX ADMIN — POTASSIUM CHLORIDE AND DEXTROSE MONOHYDRATE 30 ML/HR: 150; 5 INJECTION, SOLUTION INTRAVENOUS at 16:08

## 2022-07-12 RX ADMIN — LIDOCAINE HYDROCHLORIDE 0.5 ML: 10 INJECTION, SOLUTION EPIDURAL; INFILTRATION; INTRACAUDAL; PERINEURAL at 09:40

## 2022-07-12 RX ADMIN — FENTANYL CITRATE 25 MCG: 50 INJECTION, SOLUTION INTRAMUSCULAR; INTRAVENOUS at 18:04

## 2022-07-12 RX ADMIN — MIDAZOLAM 2 MG: 1 INJECTION INTRAMUSCULAR; INTRAVENOUS at 12:38

## 2022-07-12 RX ADMIN — Medication 0.02 MCG/KG/MIN: at 22:33

## 2022-07-12 RX ADMIN — VANCOMYCIN HYDROCHLORIDE 1500 MG: 10 INJECTION, POWDER, LYOPHILIZED, FOR SOLUTION INTRAVENOUS at 10:19

## 2022-07-12 RX ADMIN — SUFENTANIL CITRATE 150 MCG: 50 INJECTION EPIDURAL; INTRAVENOUS at 11:49

## 2022-07-12 RX ADMIN — MIDAZOLAM 1 MG: 1 INJECTION INTRAMUSCULAR; INTRAVENOUS at 11:15

## 2022-07-12 RX ADMIN — GABAPENTIN 100 MG: 100 CAPSULE ORAL at 16:08

## 2022-07-12 RX ADMIN — ACETAMINOPHEN 1000 MG: 500 TABLET ORAL at 09:59

## 2022-07-12 RX ADMIN — NITROGLYCERIN 0.5 MCG/KG/MIN: 20 INJECTION INTRAVENOUS at 11:49

## 2022-07-12 RX ADMIN — FENTANYL CITRATE 25 MCG: 50 INJECTION, SOLUTION INTRAMUSCULAR; INTRAVENOUS at 20:34

## 2022-07-12 RX ADMIN — ROCURONIUM BROMIDE 50 MG: 10 INJECTION, SOLUTION INTRAVENOUS at 11:16

## 2022-07-12 RX ADMIN — ASPIRIN 325 MG ORAL TABLET 325 MG: 325 PILL ORAL at 16:26

## 2022-07-12 RX ADMIN — FENTANYL CITRATE 25 MCG: 50 INJECTION, SOLUTION INTRAMUSCULAR; INTRAVENOUS at 19:43

## 2022-07-12 RX ADMIN — ALBUMIN HUMAN 500 ML: 0.05 INJECTION, SOLUTION INTRAVENOUS at 18:19

## 2022-07-12 RX ADMIN — ETOMIDATE 24 MG: 2 INJECTION, SOLUTION INTRAVENOUS at 11:15

## 2022-07-12 RX ADMIN — SODIUM CHLORIDE, POTASSIUM CHLORIDE, SODIUM LACTATE AND CALCIUM CHLORIDE 9 ML/HR: 600; 310; 30; 20 INJECTION, SOLUTION INTRAVENOUS at 09:47

## 2022-07-12 RX ADMIN — DEXMEDETOMIDINE HYDROCHLORIDE 0.3 MCG/KG/HR: 100 INJECTION, SOLUTION INTRAVENOUS at 11:37

## 2022-07-12 RX ADMIN — ATORVASTATIN CALCIUM 40 MG: 40 TABLET, FILM COATED ORAL at 20:00

## 2022-07-12 RX ADMIN — LIDOCAINE HYDROCHLORIDE 100 MG: 20 INJECTION, SOLUTION INFILTRATION; PERINEURAL at 11:15

## 2022-07-12 RX ADMIN — HEPARIN SODIUM 35000 UNITS: 1000 INJECTION, SOLUTION INTRAVENOUS; SUBCUTANEOUS at 12:08

## 2022-07-12 RX ADMIN — OXYCODONE HYDROCHLORIDE AND ACETAMINOPHEN 2 TABLET: 5; 325 TABLET ORAL at 22:04

## 2022-07-12 RX ADMIN — MAGNESIUM SULFATE HEPTAHYDRATE 4 G: 40 INJECTION, SOLUTION INTRAVENOUS at 17:25

## 2022-07-12 RX ADMIN — ALBUMIN HUMAN 500 ML: 0.05 INJECTION, SOLUTION INTRAVENOUS at 19:34

## 2022-07-12 RX ADMIN — AMINOCAPROIC ACID 10 G: 250 INJECTION, SOLUTION INTRAVENOUS at 11:28

## 2022-07-12 RX ADMIN — PROTAMINE SULFATE 400 MG: 10 INJECTION, SOLUTION INTRAVENOUS at 14:32

## 2022-07-12 RX ADMIN — MORPHINE SULFATE 2 MG: 2 INJECTION, SOLUTION INTRAMUSCULAR; INTRAVENOUS at 21:12

## 2022-07-12 RX ADMIN — SODIUM CHLORIDE 1.5 G: 900 INJECTION INTRAVENOUS at 21:12

## 2022-07-12 RX ADMIN — MORPHINE SULFATE 2 MG: 2 INJECTION, SOLUTION INTRAMUSCULAR; INTRAVENOUS at 18:27

## 2022-07-12 RX ADMIN — AMIODARONE HYDROCHLORIDE 1 MG/MIN: 1.8 INJECTION, SOLUTION INTRAVENOUS at 17:25

## 2022-07-12 NOTE — ANESTHESIA PREPROCEDURE EVALUATION
Anesthesia Evaluation     Patient summary reviewed and Nursing notes reviewed   history of anesthetic complications:  NPO Solid Status: > 8 hours  NPO Liquid Status: > 2 hours           Airway   Mallampati: II  TM distance: >3 FB  Neck ROM: full  No difficulty expected  Dental - normal exam     Pulmonary - normal exam   Cardiovascular - normal exam    ECG reviewed  Patient on routine beta blocker and Beta blocker given within 24 hours of surgery    (+) hypertension, CAD, hyperlipidemia,     ROS comment: 4/22 - lvef 50-55, gr1dd, nl rv, nl valves    Neuro/Psych  (+) psychiatric history,    GI/Hepatic/Renal/Endo    (+) obesity,  GERD,  thyroid problem hypothyroidism    Musculoskeletal     Abdominal  - normal exam    Bowel sounds: normal.   Substance History      OB/GYN          Other        ROS/Med Hx Other: hgb 14.6 k 4.1                Anesthesia Plan    ASA 4     general     (A line, CVL, ICU/vent)  intravenous induction     Anesthetic plan, risks, benefits, and alternatives have been provided, discussed and informed consent has been obtained with: patient.        CODE STATUS:

## 2022-07-12 NOTE — ANESTHESIA PROCEDURE NOTES
Airway  Urgency: elective    Date/Time: 7/12/2022 11:19 AM  Airway not difficult    General Information and Staff    Patient location during procedure: OR  Anesthesiologist: Ernetsina Pratt DO    Indications and Patient Condition  Indications for airway management: airway protection    Preoxygenated: yes  MILS not maintained throughout  Mask difficulty assessment: 2 - vent by mask + OA or adjuvant +/- NMBA    Final Airway Details  Final airway type: endotracheal airway      Successful airway: ETT  Cuffed: yes   Successful intubation technique: direct laryngoscopy  Facilitating devices/methods: intubating stylet  Endotracheal tube insertion site: oral  Blade: Sheryl  Blade size: 4  ETT size (mm): 8.0  Cormack-Lehane Classification: grade I - full view of glottis  Placement verified by: chest auscultation and capnometry   Measured from: lips  ETT/EBT  to lips (cm): 23  Number of attempts at approach: 1  Assessment: lips, teeth, and gum same as pre-op and atraumatic intubation    Additional Comments  Negative epigastric sounds, Breath sound equal bilaterally with symmetric chest rise and fall

## 2022-07-12 NOTE — INTERVAL H&P NOTE
"[facility] Pre-op    Full history and physical note from office is attached.    /89 (BP Location: Right arm, Patient Position: Lying)   Pulse 67   Temp 97 °F (36.1 °C) (Temporal)   Resp 18   Ht 170.2 cm (67\")   Wt 98 kg (216 lb)   SpO2 98%   BMI 33.83 kg/m²     Immunizations:  Influenza:  No  Pneumococcal:  No  Tetanus:  No  Covid : No      LAB Results:  Lab Results   Component Value Date    WBC 10.24 07/11/2022    HGB 14.6 07/11/2022    HCT 42.3 07/11/2022    MCV 89.1 07/11/2022     07/11/2022    NEUTROABS 5.26 07/11/2022    GLUCOSE 111 (H) 07/11/2022    BUN 14 07/11/2022    CREATININE 0.84 07/11/2022     07/11/2022    K 4.1 07/11/2022     07/11/2022    CO2 26.0 07/11/2022    MG 2.0 07/11/2022    CALCIUM 9.2 07/11/2022    ALBUMIN 4.60 07/11/2022    AST 13 07/11/2022    ALT 12 07/11/2022    BILITOT 0.3 07/11/2022    PTT 28.2 07/11/2022    INR 0.97 07/11/2022       Cancer Staging (if applicable)  Cancer Patient: __ yes __no __unknown__N/A; If yes, clinical stage T:__ N:__M:__, stage group or __N/A      Impression: Coronary artery disease of native artery of native heart with stable angina pectoris (HCC)      Plan: CORONARY ARTERY BYPASS GRAFTING; ENDOSCOPIC VEIN HARVEST; TRANSESOPHAGEAL ECHOCARDIOGRAM  With the above.  Patient was seen in the office and then again this morning the preoperative area.  He is on both occasions been apprised of the risk of stroke bleed infection death and agrees to proceed.  No guarantees been made as to outcome    Laura Angeles, PRISCILLA   7/12/2022   10:14 EDT   "

## 2022-07-12 NOTE — ANESTHESIA PROCEDURE NOTES
Central Line      Patient reassessed immediately prior to procedure    Patient location during procedure: OR  Indications: vascular access  Staff  Anesthesiologist: Ernestina Pratt DO  Preanesthetic Checklist  Completed: patient identified, IV checked, site marked, risks and benefits discussed, surgical consent, monitors and equipment checked, pre-op evaluation and timeout performed  Central Line Prep  Sterile Tech:cap, gloves, gown, mask and sterile barriers  Prep: chloraprep  Patient monitoring: blood pressure monitoring, continuous pulse oximetry and EKG  Central Line Procedure  Laterality:right  Location:internal jugular  Catheter Type:double lumen  Catheter Size:9 Fr  Guidance:ultrasound guided  PROCEDURE NOTE/ULTRASOUND INTERPRETATION.  Using ultrasound guidance the potential vascular sites for insertion of the catheter were visualized to determine the patency of the vessel to be used for vascular access.  After selecting the appropriate site for insertion, the needle was visualized under ultrasound being inserted into the internal jugular vein, followed by ultrasound confirmation of wire and catheter placement. There were no abnormalities seen on ultrasound; an image was taken; and the patient tolerated the procedure with no complications. Images: still images obtained, printed/placed on chart  Assessment  Post procedure:biopatch applied, line sutured, occlusive dressing applied and secured with tape  Assessement:blood return through all ports, free fluid flow, chest x-ray ordered and Arnulfo Test  Complications:no  Patient Tolerance:patient tolerated the procedure well with no apparent complications  Additional Notes  RIJMAC + SLIC by LAURENT Hernandez; USG, sterile technique, negative arnulfo x2.  CXR in ICU.

## 2022-07-12 NOTE — ANESTHESIA PROCEDURE NOTES
Arterial Line      Patient reassessed immediately prior to procedure    Patient location during procedure: pre-op   Line placed for hemodynamic monitoring.  Performed By   Anesthesiologist: Vinayak Alcantar MD  Preanesthetic Checklist  Completed: patient identified, IV checked, site marked, risks and benefits discussed, surgical consent, monitors and equipment checked, pre-op evaluation and timeout performed  Arterial Line Prep   Sterile Tech: cap, gloves and sterile barriers  Prep: ChloraPrep  Patient monitoring: blood pressure monitoring, continuous pulse oximetry and EKG  Arterial Line Procedure   Laterality:right  Location:  radial artery  Catheter size: 20 G   Guidance: palpation technique  Number of attempts: 1  Successful placement: yes  Post Assessment   Dressing Type: line sutured, occlusive dressing applied, secured with tape and wrist guard applied.   Complications no  Circ/Move/Sens Assessment: normal and unchanged.   Patient Tolerance: patient tolerated the procedure well with no apparent complications

## 2022-07-12 NOTE — PROGRESS NOTES
INTENSIVIST / PULMONARY FOLLOW UP NOTE     Hospital:  LOS: 0 days   Mr. Josh Horan, 61 y.o. male is followed for:     Coronary artery disease of native artery of native heart with stable angina pectoris (HCC)    Hypertension    Hyperlipidemia    Anxiety    Acid reflux    TAMIKO (obstructive sleep apnea)    Depression    Bipolar 1 disorder (HCC)    PTSD (post-traumatic stress disorder)    Marijuana use    Hypothyroidism          SUBJECTIVE     Josh Horan is a 61 y.o. male with PMH HTN, HLP, GERD, marijuana use, hypothyroidism, TAMIKO not CPAP compliant, anxiety, PTSD, depression and bipolar DO who was admitted today for a CABG by Dr. Anton.  He had a several week c/o chest pain with BUE numbness that worsened after he was diagnosed with COVID.  It has worsened to precordial burning and pressure with radiation to mostly LUE and associated with COWART.  He saw his PCP and had uncontrolled HTN and EKG that showed SR with minor nonspecific ST and T wave changes.  He was referred to MARBIN Bo in Cardiology on [facility].  He underwent stress test that revealed large inferior wall ischemic defect with mild anterior ischemic wall defect.  TTE revealed preserved EF with no valvular disease.  He then underwent LHC that revealed significant 3V CAD.  He was referred to Dr. Anton for surgical revascularization which was scheduled for today.     The patient's relevant past medical, surgical, family, and social history were reviewed    Allergies and medications were reviewed    ROS:  UTO - on vent          OBJECTIVE     Vital Sign Min/Max for last 24 hours:  Temp  Min: 97 °F (36.1 °C)  Max: 97 °F (36.1 °C)   BP  Min: 142/89  Max: 142/89   Pulse  Min: 67  Max: 67   Resp  Min: 18  Max: 18   SpO2  Min: 98 %  Max: 98 %   No data recorded     Physical Exam:  General Appearance:  no acute distress  Eyes:  No scleral icterus or pallor, pupils normal  Ears, Nose, Mouth, Throat:  oral endotracheal tube  Neck:  Trachea  midline, thyroid normal  Respiratory:  Clear to auscultation bilaterally, normal effort  Cardiovascular:  Regular rate and rhythm, no murmurs, no peripheral edema  Gastrointestinal:  Soft, nontender, nondistended, no hepatosplenomegaly  Skin:  Normal temperature, no rash  Psychiatric:  no agitation  Neuro:  No new focal neurologic deficits observed      Telemetry:              Hemodynamics:   CVP:     PAP:     PAOP:     CO:     CI:     SVI:     SVR:       SpO2: 98 % SpO2  Min: 98 %  Max: 98 %   Device:      Flow Rate:   No data recorded     Mechanical Ventilator Settings:                                         Intake/Ouptut 24 hrs (7:00AM - 6:59 AM)  Intake & Output (last 3 days)       07/09 0701  07/10 0700 07/10 0701  07/11 0700 07/11 0701 07/12 0700 07/12 0701 07/13 0700    Blood    1200    Total Intake(mL/kg)    1200 (12.2)    Net    +1200                  Lines, Drains & Airways     Active LDAs     Name Placement date Placement time Site Days    Peripheral IV 04/27/22 1222 Right Antecubital 04/27/22  1222  Antecubital  75                Hematology:  Results from last 7 days   Lab Units 07/11/22  1209   WBC 10*3/mm3 10.24   HEMOGLOBIN g/dL 14.6   HEMATOCRIT % 42.3   PLATELETS 10*3/mm3 212     Electrolytes, Magnesium and Phosphorus:  Results from last 7 days   Lab Units 07/11/22  1209   SODIUM mmol/L 140   CHLORIDE mmol/L 105   POTASSIUM mmol/L 4.1   CO2 mmol/L 26.0   MAGNESIUM mg/dL 2.0     Renal:  Results from last 7 days   Lab Units 07/11/22  1209   CREATININE mg/dL 0.84   BUN mg/dL 14     Estimated Creatinine Clearance: 105.7 mL/min (by C-G formula based on SCr of 0.84 mg/dL).  Hepatic:  Results from last 7 days   Lab Units 07/11/22  1209   ALK PHOS U/L 91   BILIRUBIN mg/dL 0.3   ALT (SGPT) U/L 12   AST (SGOT) U/L 13     Arterial Blood Gases:        Results from last 7 days   Lab Units 07/11/22  1209   HEMOGLOBIN A1C % 5.40       No results found for: LACTATE    Relevant imaging studies and labs from  07/12/22 were reviewed    Medications (drips):              Assessment & Plan   IMPRESSION / PLAN     Inpatient Problem List:  61 y.o.male:  Active Hospital Problems    Diagnosis    • **Coronary artery disease of native artery of native heart with stable angina pectoris (HCC)      S/p CABG by Dr. Anton 7/12/22     • Hypertension    • Hyperlipidemia    • Anxiety    • Acid reflux    • TAMIKO (obstructive sleep apnea)      doesnt use machine     • Depression    • Bipolar 1 disorder (HCC)    • PTSD (post-traumatic stress disorder)    • Marijuana use    • Hypothyroidism         Hospital Course:  61 y.o.male with relevant PMH of HTN, HLP, GERD, marijuana use, hypothyroidism, TAMIKO not CPAP compliant, anxiety, PTSD, depression, bipolar DO, recently diagnosed MVCAD admitted 7/12/2022 post op 5vCABG by Dr. Anton.    Impression & Plan:    Sedation  Pain  PTSD  Bipolar d/o  Propofol & precedex prn.  Titrate narcotics.  Resume home psych meds tomorrow.    Mechanical Ventilation  Wean per protocol    Hypothyroidism  Resume levothyroxine tomorrow    Stress Hyperglycemia A1c 5.4  Insulin gtt    CAD  HTN  HLD  Per Cardiology    Post op Care  Per CTS    DVT / PUD Prophylaxis  Foot pumps, SQ Heparin when ok w/ CTS, Protonix    Nutrition  Dietary Orders (From admission, onward)     Start     Ordered    07/12/22 1545  NPO Diet NPO Type: Strict NPO  Diet Effective Now        Question:  NPO Type  Answer:  Strict NPO    07/12/22 1546              Plan of care and goals reviewed with multidisciplinary team at daily rounds    High risk secondary to IV anesthesia, IV narcotics, insulin gtt, mechanical ventilation, post op major surgery with risk factors.       Norman Meléndez MD  Intensive Care Medicine  07/12/22 16:04 EDT

## 2022-07-12 NOTE — ANESTHESIA POSTPROCEDURE EVALUATION
Patient: Josh Horan    Procedure Summary     Date: 07/12/22 Room / Location:  [facility]  [facility]    Anesthesia Start: 1112 Anesthesia Stop:     Procedures:       MEDIAN STERNOTOMY CORONARY ARTERY BYPASS GRAFTING X5 , UTILIZING THE LEFT INTERNAL MAMMERY GRAFT (N/A Chest)      ENDOSCOPIC VEIN HARVEST OF THE GREATER RIGHT SAPHENOUS VEIN WITH EXPLORATION OF THE LEFT LEG (N/A ) Diagnosis:       Coronary artery disease involving native heart, unspecified vessel or lesion type, unspecified whether angina present      Abnormal findings on diagnostic imaging of heart and coronary circulation      (Coronary artery disease involving native heart, unspecified vessel or lesion type, unspecified whether angina present [I25.10])      (Abnormal findings on diagnostic imaging of heart and coronary circulation [R93.1])    Surgeons: Baldomero Anton MD Provider: Krishna Cottrell MD    Anesthesia Type: general ASA Status: 4          Anesthesia Type: general    Vitals  No vitals data found for the desired time range.          Post Anesthesia Care and Evaluation    Patient location during evaluation: ICU  Patient participation: complete - patient participated  Pain management: adequate    Airway patency: patent  Anesthetic complications: No anesthetic complications  PONV Status: none  Cardiovascular status: hemodynamically stable and acceptable  Respiratory status: acceptable, ETT, intubated and ventilator  Hydration status: acceptable

## 2022-07-13 ENCOUNTER — APPOINTMENT (OUTPATIENT)
Dept: GENERAL RADIOLOGY | Facility: HOSPITAL | Age: 62
End: 2022-07-13

## 2022-07-13 LAB
ACT BLD: 114 SECONDS (ref 82–152)
ACT BLD: 515 SECONDS (ref 82–152)
ACT BLD: 520 SECONDS (ref 82–152)
ACT BLD: 532 SECONDS (ref 82–152)
ACT BLD: 544 SECONDS (ref 82–152)
ACT BLD: 98 SECONDS (ref 82–152)
ALBUMIN SERPL-MCNC: 4.3 G/DL (ref 3.5–5.2)
ANION GAP SERPL CALCULATED.3IONS-SCNC: 8 MMOL/L (ref 5–15)
ARTERIAL PATENCY WRIST A: ABNORMAL
ATMOSPHERIC PRESS: ABNORMAL MM[HG]
BASE EXCESS BLDA CALC-SCNC: -0.5 MMOL/L (ref 0–2)
BASE EXCESS BLDA CALC-SCNC: -1 MMOL/L (ref -5–5)
BASE EXCESS BLDA CALC-SCNC: -2 MMOL/L (ref -5–5)
BASE EXCESS BLDA CALC-SCNC: -3 MMOL/L (ref -5–5)
BASE EXCESS BLDA CALC-SCNC: -5 MMOL/L (ref -5–5)
BASE EXCESS BLDA CALC-SCNC: -5 MMOL/L (ref -5–5)
BASE EXCESS BLDA CALC-SCNC: -6 MMOL/L (ref -5–5)
BASE EXCESS BLDA CALC-SCNC: 1 MMOL/L (ref -5–5)
BASOPHILS # BLD AUTO: 0.06 10*3/MM3 (ref 0–0.2)
BASOPHILS NFR BLD AUTO: 0.5 % (ref 0–1.5)
BDY SITE: ABNORMAL
BH BB BLOOD EXPIRATION DATE: NORMAL
BH BB BLOOD TYPE BARCODE: 6200
BH BB BLOOD TYPE BARCODE: 6200
BH BB BLOOD TYPE BARCODE: 7300
BH BB BLOOD TYPE BARCODE: 7300
BH BB DISPENSE STATUS: NORMAL
BH BB PRODUCT CODE: NORMAL
BH BB UNIT NUMBER: NORMAL
BODY TEMPERATURE: 37 C
BUN SERPL-MCNC: 11 MG/DL (ref 6–20)
BUN/CREAT SERPL: 14.1 (ref 7–25)
CA-I BLDA-SCNC: 0.9 MMOL/L (ref 1.2–1.32)
CA-I BLDA-SCNC: 0.91 MMOL/L (ref 1.2–1.32)
CA-I BLDA-SCNC: 0.92 MMOL/L (ref 1.2–1.32)
CA-I BLDA-SCNC: 0.99 MMOL/L (ref 1.2–1.32)
CA-I BLDA-SCNC: 1.05 MMOL/L (ref 1.2–1.32)
CA-I BLDA-SCNC: 1.2 MMOL/L (ref 1.2–1.32)
CA-I BLDA-SCNC: 1.3 MMOL/L (ref 1.2–1.32)
CALCIUM SPEC-SCNC: 7.9 MG/DL (ref 8.6–10.5)
CHLORIDE SERPL-SCNC: 107 MMOL/L (ref 98–107)
CO2 BLDA-SCNC: 21 MMOL/L (ref 24–29)
CO2 BLDA-SCNC: 21 MMOL/L (ref 24–29)
CO2 BLDA-SCNC: 23 MMOL/L (ref 24–29)
CO2 BLDA-SCNC: 24 MMOL/L (ref 24–29)
CO2 BLDA-SCNC: 25.6 MMOL/L (ref 22–33)
CO2 BLDA-SCNC: 26 MMOL/L (ref 24–29)
CO2 BLDA-SCNC: 26 MMOL/L (ref 24–29)
CO2 BLDA-SCNC: 27 MMOL/L (ref 24–29)
CO2 SERPL-SCNC: 25 MMOL/L (ref 22–29)
COHGB MFR BLD: 1 % (ref 0–2)
CREAT SERPL-MCNC: 0.78 MG/DL (ref 0.76–1.27)
CROSSMATCH INTERPRETATION: NORMAL
CROSSMATCH INTERPRETATION: NORMAL
DEPRECATED RDW RBC AUTO: 41.1 FL (ref 37–54)
EGFRCR SERPLBLD CKD-EPI 2021: 102.7 ML/MIN/1.73
EOSINOPHIL # BLD AUTO: 0.23 10*3/MM3 (ref 0–0.4)
EOSINOPHIL NFR BLD AUTO: 2.1 % (ref 0.3–6.2)
EPAP: 0
ERYTHROCYTE [DISTWIDTH] IN BLOOD BY AUTOMATED COUNT: 12.8 % (ref 12.3–15.4)
GLUCOSE BLDC GLUCOMTR-MCNC: 108 MG/DL (ref 70–130)
GLUCOSE BLDC GLUCOMTR-MCNC: 125 MG/DL (ref 70–130)
GLUCOSE BLDC GLUCOMTR-MCNC: 126 MG/DL (ref 70–130)
GLUCOSE BLDC GLUCOMTR-MCNC: 134 MG/DL (ref 70–130)
GLUCOSE BLDC GLUCOMTR-MCNC: 134 MG/DL (ref 70–130)
GLUCOSE BLDC GLUCOMTR-MCNC: 137 MG/DL (ref 70–130)
GLUCOSE BLDC GLUCOMTR-MCNC: 139 MG/DL (ref 70–130)
GLUCOSE BLDC GLUCOMTR-MCNC: 142 MG/DL (ref 70–130)
GLUCOSE BLDC GLUCOMTR-MCNC: 145 MG/DL (ref 70–130)
GLUCOSE BLDC GLUCOMTR-MCNC: 147 MG/DL (ref 70–130)
GLUCOSE BLDC GLUCOMTR-MCNC: 147 MG/DL (ref 70–130)
GLUCOSE BLDC GLUCOMTR-MCNC: 149 MG/DL (ref 70–130)
GLUCOSE BLDC GLUCOMTR-MCNC: 150 MG/DL (ref 70–130)
GLUCOSE BLDC GLUCOMTR-MCNC: 151 MG/DL (ref 70–130)
GLUCOSE BLDC GLUCOMTR-MCNC: 155 MG/DL (ref 70–130)
GLUCOSE BLDC GLUCOMTR-MCNC: 156 MG/DL (ref 70–130)
GLUCOSE BLDC GLUCOMTR-MCNC: 158 MG/DL (ref 70–130)
GLUCOSE BLDC GLUCOMTR-MCNC: 161 MG/DL (ref 70–130)
GLUCOSE BLDC GLUCOMTR-MCNC: 98 MG/DL (ref 70–130)
GLUCOSE SERPL-MCNC: 133 MG/DL (ref 65–99)
HCO3 BLDA-SCNC: 19.5 MMOL/L (ref 22–26)
HCO3 BLDA-SCNC: 20.3 MMOL/L (ref 22–26)
HCO3 BLDA-SCNC: 21.6 MMOL/L (ref 22–26)
HCO3 BLDA-SCNC: 22.7 MMOL/L (ref 22–26)
HCO3 BLDA-SCNC: 24.3 MMOL/L (ref 22–26)
HCO3 BLDA-SCNC: 24.4 MMOL/L (ref 20–26)
HCO3 BLDA-SCNC: 24.4 MMOL/L (ref 22–26)
HCO3 BLDA-SCNC: 26 MMOL/L (ref 22–26)
HCT VFR BLD AUTO: 30.1 % (ref 37.5–51)
HCT VFR BLD CALC: 30.8 % (ref 38–51)
HCT VFR BLDA CALC: 26 % (ref 38–51)
HCT VFR BLDA CALC: 27 % (ref 38–51)
HCT VFR BLDA CALC: 27 % (ref 38–51)
HCT VFR BLDA CALC: 28 % (ref 38–51)
HCT VFR BLDA CALC: 31 % (ref 38–51)
HCT VFR BLDA CALC: 33 % (ref 38–51)
HCT VFR BLDA CALC: 36 % (ref 38–51)
HGB BLD-MCNC: 10.4 G/DL (ref 13–17.7)
HGB BLDA-MCNC: 10.1 G/DL (ref 13.5–17.5)
HGB BLDA-MCNC: 10.5 G/DL (ref 12–17)
HGB BLDA-MCNC: 11.2 G/DL (ref 12–17)
HGB BLDA-MCNC: 12.2 G/DL (ref 12–17)
HGB BLDA-MCNC: 8.8 G/DL (ref 12–17)
HGB BLDA-MCNC: 9.2 G/DL (ref 12–17)
HGB BLDA-MCNC: 9.2 G/DL (ref 12–17)
HGB BLDA-MCNC: 9.5 G/DL (ref 12–17)
IMM GRANULOCYTES # BLD AUTO: 0.04 10*3/MM3 (ref 0–0.05)
IMM GRANULOCYTES NFR BLD AUTO: 0.4 % (ref 0–0.5)
INHALED O2 CONCENTRATION: 40 %
INR PPP: 1.2 (ref 0.84–1.13)
IPAP: 0
LYMPHOCYTES # BLD AUTO: 1.45 10*3/MM3 (ref 0.7–3.1)
LYMPHOCYTES NFR BLD AUTO: 13.3 % (ref 19.6–45.3)
MAGNESIUM SERPL-MCNC: 2.4 MG/DL (ref 1.6–2.6)
MCH RBC QN AUTO: 30.3 PG (ref 26.6–33)
MCHC RBC AUTO-ENTMCNC: 34.6 G/DL (ref 31.5–35.7)
MCV RBC AUTO: 87.8 FL (ref 79–97)
METHGB BLD QL: 0.4 % (ref 0–1.5)
MODALITY: ABNORMAL
MONOCYTES # BLD AUTO: 1.06 10*3/MM3 (ref 0.1–0.9)
MONOCYTES NFR BLD AUTO: 9.7 % (ref 5–12)
NEUTROPHILS NFR BLD AUTO: 74 % (ref 42.7–76)
NEUTROPHILS NFR BLD AUTO: 8.08 10*3/MM3 (ref 1.7–7)
NOTE: ABNORMAL
NRBC BLD AUTO-RTO: 0 /100 WBC (ref 0–0.2)
OXYHGB MFR BLDV: 97.6 % (ref 94–99)
PAW @ PEAK INSP FLOW SETTING VENT: 0 CMH2O
PCO2 BLDA: 35.8 MM HG (ref 35–45)
PCO2 BLDA: 36 MM HG (ref 35–45)
PCO2 BLDA: 37.5 MM HG (ref 35–45)
PCO2 BLDA: 40.1 MM HG (ref 35–45)
PCO2 BLDA: 42.5 MM HG (ref 35–45)
PCO2 BLDA: 44.4 MM HG (ref 35–45)
PCO2 BLDA: 50.2 MM HG (ref 35–45)
PCO2 BLDA: 53.7 MM HG (ref 35–45)
PCO2 TEMP ADJ BLD: 40.1 MM HG (ref 35–48)
PEEP RESPIRATORY: 5 CM[H2O]
PH BLDA: 7.24 PH UNITS (ref 7.35–7.6)
PH BLDA: 7.3 PH UNITS (ref 7.35–7.6)
PH BLDA: 7.32 PH UNITS (ref 7.35–7.6)
PH BLDA: 7.36 PH UNITS (ref 7.35–7.6)
PH BLDA: 7.37 PH UNITS (ref 7.35–7.6)
PH BLDA: 7.38 PH UNITS (ref 7.35–7.6)
PH BLDA: 7.39 PH UNITS (ref 7.35–7.45)
PH BLDA: 7.39 PH UNITS (ref 7.35–7.6)
PH, TEMP CORRECTED: 7.39 PH UNITS
PHOSPHATE SERPL-MCNC: 2.4 MG/DL (ref 2.5–4.5)
PLATELET # BLD AUTO: 176 10*3/MM3 (ref 140–450)
PMV BLD AUTO: 10.8 FL (ref 6–12)
PO2 BLDA: 111 MM HG (ref 83–108)
PO2 BLDA: 175 MMHG (ref 80–105)
PO2 BLDA: 184 MMHG (ref 80–105)
PO2 BLDA: 349 MMHG (ref 80–105)
PO2 BLDA: 35 MMHG (ref 80–105)
PO2 BLDA: 390 MMHG (ref 80–105)
PO2 BLDA: 410 MMHG (ref 80–105)
PO2 BLDA: 75 MMHG (ref 80–105)
PO2 TEMP ADJ BLD: 111 MM HG (ref 83–108)
POTASSIUM BLDA-SCNC: 2.9 MMOL/L (ref 3.5–4.9)
POTASSIUM BLDA-SCNC: 3.5 MMOL/L (ref 3.5–4.9)
POTASSIUM BLDA-SCNC: 4.3 MMOL/L (ref 3.5–4.9)
POTASSIUM BLDA-SCNC: 4.4 MMOL/L (ref 3.5–4.9)
POTASSIUM BLDA-SCNC: 5 MMOL/L (ref 3.5–4.9)
POTASSIUM BLDA-SCNC: 5 MMOL/L (ref 3.5–4.9)
POTASSIUM BLDA-SCNC: 5.9 MMOL/L (ref 3.5–4.9)
POTASSIUM SERPL-SCNC: 4 MMOL/L (ref 3.5–5.2)
PROTHROMBIN TIME: 15.2 SECONDS (ref 11.4–14.4)
QT INTERVAL: 386 MS
QT INTERVAL: 400 MS
QTC INTERVAL: 398 MS
QTC INTERVAL: 425 MS
RBC # BLD AUTO: 3.43 10*6/MM3 (ref 4.14–5.8)
SAO2 % BLDA: 100 % (ref 95–98)
SAO2 % BLDA: 56 % (ref 95–98)
SAO2 % BLDA: 95 % (ref 95–98)
SODIUM BLD-SCNC: 137 MMOL/L (ref 138–146)
SODIUM BLD-SCNC: 138 MMOL/L (ref 138–146)
SODIUM BLD-SCNC: 138 MMOL/L (ref 138–146)
SODIUM BLD-SCNC: 139 MMOL/L (ref 138–146)
SODIUM BLD-SCNC: 141 MMOL/L (ref 138–146)
SODIUM BLD-SCNC: 141 MMOL/L (ref 138–146)
SODIUM BLD-SCNC: 142 MMOL/L (ref 138–146)
SODIUM SERPL-SCNC: 140 MMOL/L (ref 136–145)
TOTAL RATE: 0 BREATHS/MINUTE
UNIT  ABO: NORMAL
UNIT  RH: NORMAL
WBC NRBC COR # BLD: 10.92 10*3/MM3 (ref 3.4–10.8)

## 2022-07-13 PROCEDURE — 94799 UNLISTED PULMONARY SVC/PX: CPT

## 2022-07-13 PROCEDURE — 80069 RENAL FUNCTION PANEL: CPT | Performed by: PHYSICIAN ASSISTANT

## 2022-07-13 PROCEDURE — 83735 ASSAY OF MAGNESIUM: CPT | Performed by: PHYSICIAN ASSISTANT

## 2022-07-13 PROCEDURE — 25010000002 MORPHINE PER 10 MG: Performed by: THORACIC SURGERY (CARDIOTHORACIC VASCULAR SURGERY)

## 2022-07-13 PROCEDURE — 82375 ASSAY CARBOXYHB QUANT: CPT

## 2022-07-13 PROCEDURE — 25010000002 AMIODARONE IN DEXTROSE 5% 360-4.14 MG/200ML-% SOLUTION: Performed by: THORACIC SURGERY (CARDIOTHORACIC VASCULAR SURGERY)

## 2022-07-13 PROCEDURE — 82805 BLOOD GASES W/O2 SATURATION: CPT

## 2022-07-13 PROCEDURE — 25010000002 ONDANSETRON PER 1 MG: Performed by: PHYSICIAN ASSISTANT

## 2022-07-13 PROCEDURE — 25010000002 ALBUMIN HUMAN 5% PER 50 ML: Performed by: NURSE PRACTITIONER

## 2022-07-13 PROCEDURE — 99233 SBSQ HOSP IP/OBS HIGH 50: CPT | Performed by: INTERNAL MEDICINE

## 2022-07-13 PROCEDURE — 85025 COMPLETE CBC W/AUTO DIFF WBC: CPT | Performed by: PHYSICIAN ASSISTANT

## 2022-07-13 PROCEDURE — 83050 HGB METHEMOGLOBIN QUAN: CPT

## 2022-07-13 PROCEDURE — 82962 GLUCOSE BLOOD TEST: CPT

## 2022-07-13 PROCEDURE — 93010 ELECTROCARDIOGRAM REPORT: CPT | Performed by: INTERNAL MEDICINE

## 2022-07-13 PROCEDURE — 85610 PROTHROMBIN TIME: CPT | Performed by: PHYSICIAN ASSISTANT

## 2022-07-13 PROCEDURE — 0 CEFUROXIME SODIUM 1.5 G RECONSTITUTED SOLUTION: Performed by: THORACIC SURGERY (CARDIOTHORACIC VASCULAR SURGERY)

## 2022-07-13 PROCEDURE — 93005 ELECTROCARDIOGRAM TRACING: CPT | Performed by: PHYSICIAN ASSISTANT

## 2022-07-13 PROCEDURE — 25010000002 FENTANYL CITRATE (PF) 50 MCG/ML SOLUTION: Performed by: THORACIC SURGERY (CARDIOTHORACIC VASCULAR SURGERY)

## 2022-07-13 PROCEDURE — P9041 ALBUMIN (HUMAN),5%, 50ML: HCPCS | Performed by: NURSE PRACTITIONER

## 2022-07-13 PROCEDURE — 97162 PT EVAL MOD COMPLEX 30 MIN: CPT

## 2022-07-13 PROCEDURE — 71045 X-RAY EXAM CHEST 1 VIEW: CPT

## 2022-07-13 PROCEDURE — 99024 POSTOP FOLLOW-UP VISIT: CPT | Performed by: THORACIC SURGERY (CARDIOTHORACIC VASCULAR SURGERY)

## 2022-07-13 RX ORDER — PANTOPRAZOLE SODIUM 40 MG/10ML
40 INJECTION, POWDER, LYOPHILIZED, FOR SOLUTION INTRAVENOUS
Status: DISCONTINUED | OUTPATIENT
Start: 2022-07-13 | End: 2022-07-15

## 2022-07-13 RX ORDER — ALBUMIN, HUMAN INJ 5% 5 %
500 SOLUTION INTRAVENOUS ONCE
Status: COMPLETED | OUTPATIENT
Start: 2022-07-13 | End: 2022-07-13

## 2022-07-13 RX ORDER — FENTANYL/ROPIVACAINE/NS/PF 2-625MCG/1
15 PLASTIC BAG, INJECTION (ML) EPIDURAL AS NEEDED
Status: DISCONTINUED | OUTPATIENT
Start: 2022-07-13 | End: 2022-07-18

## 2022-07-13 RX ORDER — DULOXETIN HYDROCHLORIDE 60 MG/1
60 CAPSULE, DELAYED RELEASE ORAL DAILY
Status: DISCONTINUED | OUTPATIENT
Start: 2022-07-13 | End: 2022-07-20 | Stop reason: HOSPADM

## 2022-07-13 RX ORDER — QUETIAPINE FUMARATE 25 MG/1
50 TABLET, FILM COATED ORAL NIGHTLY
Status: DISCONTINUED | OUTPATIENT
Start: 2022-07-13 | End: 2022-07-20 | Stop reason: HOSPADM

## 2022-07-13 RX ORDER — BUSPIRONE HYDROCHLORIDE 10 MG/1
10 TABLET ORAL 3 TIMES DAILY
Status: DISCONTINUED | OUTPATIENT
Start: 2022-07-13 | End: 2022-07-20 | Stop reason: HOSPADM

## 2022-07-13 RX ORDER — AMOXICILLIN 250 MG
2 CAPSULE ORAL 2 TIMES DAILY
Status: DISCONTINUED | OUTPATIENT
Start: 2022-07-13 | End: 2022-07-20 | Stop reason: HOSPADM

## 2022-07-13 RX ORDER — DEXTROSE MONOHYDRATE 25 G/50ML
25 INJECTION, SOLUTION INTRAVENOUS
Status: DISCONTINUED | OUTPATIENT
Start: 2022-07-13 | End: 2022-07-14

## 2022-07-13 RX ORDER — NICOTINE POLACRILEX 4 MG
15 LOZENGE BUCCAL
Status: DISCONTINUED | OUTPATIENT
Start: 2022-07-13 | End: 2022-07-14

## 2022-07-13 RX ORDER — TIZANIDINE 4 MG/1
4 TABLET ORAL NIGHTLY PRN
Status: DISCONTINUED | OUTPATIENT
Start: 2022-07-13 | End: 2022-07-20 | Stop reason: HOSPADM

## 2022-07-13 RX ORDER — INSULIN LISPRO 100 [IU]/ML
0-7 INJECTION, SOLUTION INTRAVENOUS; SUBCUTANEOUS
Status: DISCONTINUED | OUTPATIENT
Start: 2022-07-13 | End: 2022-07-14

## 2022-07-13 RX ADMIN — OXYCODONE HYDROCHLORIDE AND ACETAMINOPHEN 2 TABLET: 5; 325 TABLET ORAL at 03:24

## 2022-07-13 RX ADMIN — MORPHINE SULFATE 2 MG: 2 INJECTION, SOLUTION INTRAMUSCULAR; INTRAVENOUS at 20:49

## 2022-07-13 RX ADMIN — ONDANSETRON 4 MG: 2 INJECTION INTRAMUSCULAR; INTRAVENOUS at 08:23

## 2022-07-13 RX ADMIN — ONDANSETRON 4 MG: 2 INJECTION INTRAMUSCULAR; INTRAVENOUS at 00:13

## 2022-07-13 RX ADMIN — PANTOPRAZOLE SODIUM 40 MG: 40 INJECTION, POWDER, FOR SOLUTION INTRAVENOUS at 09:59

## 2022-07-13 RX ADMIN — POTASSIUM PHOSPHATE, MONOBASIC AND POTASSIUM PHOSPHATE, DIBASIC 15 MMOL: 224; 236 INJECTION, SOLUTION, CONCENTRATE INTRAVENOUS at 04:10

## 2022-07-13 RX ADMIN — ATORVASTATIN CALCIUM 40 MG: 40 TABLET, FILM COATED ORAL at 20:02

## 2022-07-13 RX ADMIN — BUSPIRONE HYDROCHLORIDE 10 MG: 10 TABLET ORAL at 20:02

## 2022-07-13 RX ADMIN — MORPHINE SULFATE 2 MG: 2 INJECTION, SOLUTION INTRAMUSCULAR; INTRAVENOUS at 03:24

## 2022-07-13 RX ADMIN — METOPROLOL TARTRATE 12.5 MG: 25 TABLET, FILM COATED ORAL at 20:02

## 2022-07-13 RX ADMIN — MORPHINE SULFATE 2 MG: 2 INJECTION, SOLUTION INTRAMUSCULAR; INTRAVENOUS at 13:40

## 2022-07-13 RX ADMIN — TIZANIDINE 4 MG: 4 TABLET ORAL at 20:02

## 2022-07-13 RX ADMIN — SODIUM CHLORIDE 1.5 G: 900 INJECTION INTRAVENOUS at 13:31

## 2022-07-13 RX ADMIN — HYDROCODONE BITARTRATE AND ACETAMINOPHEN 1 TABLET: 7.5; 325 TABLET ORAL at 17:17

## 2022-07-13 RX ADMIN — GABAPENTIN 100 MG: 100 CAPSULE ORAL at 15:09

## 2022-07-13 RX ADMIN — CHLORHEXIDINE GLUCONATE 0.12% ORAL RINSE 15 ML: 1.2 LIQUID ORAL at 08:05

## 2022-07-13 RX ADMIN — MORPHINE SULFATE 2 MG: 2 INJECTION, SOLUTION INTRAMUSCULAR; INTRAVENOUS at 00:13

## 2022-07-13 RX ADMIN — QUETIAPINE FUMARATE 50 MG: 25 TABLET ORAL at 20:02

## 2022-07-13 RX ADMIN — GABAPENTIN 100 MG: 100 CAPSULE ORAL at 08:05

## 2022-07-13 RX ADMIN — BUSPIRONE HYDROCHLORIDE 10 MG: 10 TABLET ORAL at 15:09

## 2022-07-13 RX ADMIN — AMIODARONE HYDROCHLORIDE 0.5 MG/MIN: 1.8 INJECTION, SOLUTION INTRAVENOUS at 05:52

## 2022-07-13 RX ADMIN — SENNOSIDES AND DOCUSATE SODIUM 2 TABLET: 50; 8.6 TABLET ORAL at 20:02

## 2022-07-13 RX ADMIN — SODIUM CHLORIDE 1.5 G: 900 INJECTION INTRAVENOUS at 22:03

## 2022-07-13 RX ADMIN — ALBUMIN HUMAN 500 ML: 0.05 INJECTION, SOLUTION INTRAVENOUS at 04:59

## 2022-07-13 RX ADMIN — SODIUM CHLORIDE 1.5 G: 900 INJECTION INTRAVENOUS at 05:01

## 2022-07-13 RX ADMIN — OXYCODONE HYDROCHLORIDE AND ACETAMINOPHEN 2 TABLET: 5; 325 TABLET ORAL at 20:02

## 2022-07-13 RX ADMIN — ACETAMINOPHEN 325MG 650 MG: 325 TABLET ORAL at 17:00

## 2022-07-13 RX ADMIN — CHLORHEXIDINE GLUCONATE 0.12% ORAL RINSE 15 ML: 1.2 LIQUID ORAL at 20:04

## 2022-07-13 RX ADMIN — GABAPENTIN 100 MG: 100 CAPSULE ORAL at 20:02

## 2022-07-13 RX ADMIN — SENNOSIDES AND DOCUSATE SODIUM 2 TABLET: 50; 8.6 TABLET ORAL at 13:32

## 2022-07-13 RX ADMIN — DULOXETINE HYDROCHLORIDE 60 MG: 60 CAPSULE, DELAYED RELEASE ORAL at 13:30

## 2022-07-13 RX ADMIN — ASPIRIN 325 MG: 325 TABLET, COATED ORAL at 08:05

## 2022-07-13 RX ADMIN — MORPHINE SULFATE 2 MG: 2 INJECTION, SOLUTION INTRAMUSCULAR; INTRAVENOUS at 10:08

## 2022-07-13 RX ADMIN — MORPHINE SULFATE 2 MG: 2 INJECTION, SOLUTION INTRAMUSCULAR; INTRAVENOUS at 08:09

## 2022-07-13 RX ADMIN — ACETAMINOPHEN 325MG 650 MG: 325 TABLET ORAL at 01:31

## 2022-07-13 RX ADMIN — FENTANYL CITRATE 25 MCG: 50 INJECTION, SOLUTION INTRAMUSCULAR; INTRAVENOUS at 04:10

## 2022-07-13 RX ADMIN — MORPHINE SULFATE 2 MG: 2 INJECTION, SOLUTION INTRAMUSCULAR; INTRAVENOUS at 01:31

## 2022-07-13 RX ADMIN — ONDANSETRON 4 MG: 2 INJECTION INTRAMUSCULAR; INTRAVENOUS at 17:17

## 2022-07-13 RX ADMIN — METOPROLOL TARTRATE 2.5 MG: 5 INJECTION INTRAVENOUS at 13:31

## 2022-07-13 NOTE — OP NOTE
Operative Report    Preop Diagnosis: Coronary artery disease.  Hypertension.  Hyperlipidemia.  Anemia preoperatively      Postoperative Diagnosis: Same      Procedure: Coronary artery bypass grafting x5 with endoscopic harvesting of the right great saphenous vein.  Left internal mammary artery to left anterior descending.  Saphenous vein graft to the right coronary.  Saphenous vein graft to the diagonal branch LAD.  Saphenous vein graft sequence between obtuse marginal 1 and obtuse marginal 2 of the circumflex system.  Ligation of the left atrial appendage with a size 35 atrial clip        Surgeons: Baldomero Anton MD      Assistant: Vera Keita PA-C    The Assistant provided services of suctioning, irrigation and retraction.  Also, assisted in suture closure of parts of the skin incision.      Indication: Patient referred to me with coronary disease and seen and evaluated both in the office in the preoperative area.  He understood that surgery had a risk of stroke bleed infection death renal failure and agreed to proceed.  No guarantees were made as to outcome.        Description: Supine position sterile prep and drape and antibiotics given.  Right great saphenous vein was harvested endoscopically.  Median sternotomy performed in the left internal mammary harvested.  Should be noted that the patient had bouts of atrial fibrillation in the operating room which were not noted previously in his past medical record.  Patient was heparinized and cannulas placed in the acing aorta and right atrial appendage.  Patient begun on cardiopulmonary bypass and aorta crossclamped and antegrade blood cardioplegia was given.  There was a rapid electrical and mechanical arrest following administration of the plegia.  For saphenous vein graft was sutured in end-to-side fashion to a diagonal branch of the LAD which was 1-1/2 mm vessel.  Second saphenous vein graft sutured to a 2 mm right coronary artery.  Third saphenous vein  graft sequenced in side-to-side fashion to obtuse marginal 1 in end-to-side fashion to obtuse marginal 2.  Because of the patient's atrial fibrillation we decided it would be in his best interest to ligate the left atrial appendage to minimize the risk of future stroke.  If he can detect no clot in the left atrial appendage a size 35 atrial clip was applied.  The left internal mammary was sutured to the left anterior descending coronary and the 3 proximal vein graft sutured to the ascending aorta.  The operation was somewhat technically difficult as this patient's lungs seem somewhat large and hyperinflated making the exposure difficult and visually old.  However he was able to be weaned from bypass without significant difficulty ventricular and atrial pacing wires were placed.  Protamine was given reverse the heparin the sternum was irrigated with an antibiotic solution and the sternum was closed with wire the fashion skin was suture and the sponge and needle count reported as correct and estimated blood loss less than 300 mL      Please note that portions of this note were completed with a voice recognition program. Efforts were made to edit the dictations, but occasionally words are mistranscribed.

## 2022-07-13 NOTE — CASE MANAGEMENT/SOCIAL WORK
Discharge Planning Assessment  [facility]     Patient Name: Josh Horan  MRN: [id number]  Today's Date: 7/13/2022    Admit Date: 7/12/2022     Discharge Needs Assessment     Row Name 07/13/22 0942       Living Environment    People in Home child(gustavo), adult    Name(s) of People in Home Michael Horan (son) [phone]    Current Living Arrangements home    Primary Care Provided by self    Provides Primary Care For no one    Family Caregiver if Needed child(gustavo), adult    Family Caregiver Names Michael Horan (son) [phone]    Able to Return to Prior Arrangements yes       Resource/Environmental Concerns    Resource/Environmental Concerns none       Transition Planning    Patient/Family Anticipates Transition to home with family    Patient/Family Anticipated Services at Transition none    Transportation Anticipated family or friend will provide       Discharge Needs Assessment    Readmission Within the Last 30 Days no previous admission in last 30 days    Equipment Currently Used at Home none    Concerns to be Addressed denies needs/concerns at this time               Discharge Plan     Row Name 07/13/22 0946       Plan    Plan Home with family    Patient/Family in Agreement with Plan yes    Plan Comments Spoke with patient at bedside. Lives with Michael Horan (son) [phone] in [facility]. Is independent with ADL's. No problems with [facility] Medicare/[employer] or medications. Uses no DME at home. No advanced directives. Plan is home with son. CM will continue to follow.    Final Discharge Disposition Code 01 - home or self-care              Continued Care and Services - Admitted Since 7/12/2022    Coordination has not been started for this encounter.          Demographic Summary     Row Name 07/13/22 0939       General Information    Admission Type inpatient    Arrived From PACU/recovery room    Referral Source admission list    Reason for Consult discharge planning    Preferred Language English        Contact Information    Permission Granted to Share Info With     Contact Information Obtained for     Contact Information Comments PCP is PRISCILLA Monzon               Functional Status     Row Name 07/13/22 0941       Functional Status    Usual Activity Tolerance good    Current Activity Tolerance good       Functional Status, IADL    Medications independent    Meal Preparation independent    Housekeeping independent    Laundry independent    Shopping independent       Mental Status    General Appearance WDL WDL       Mental Status Summary    Recent Changes in Mental Status/Cognitive Functioning no changes       Employment/    Employment Status disabled               Psychosocial    No documentation.                Abuse/Neglect    No documentation.                Legal    No documentation.                Substance Abuse    No documentation.                Patient Forms    No documentation.                   Júnior Erickson RN

## 2022-07-13 NOTE — PROGRESS NOTES
CTS Progress Note       LOS: 1 day   Patient Care Team:  Jerrica Madrid APRN as PCP - General (Family Medicine)    Chief Complaint: Coronary artery disease of native artery of native heart with stable angina pectoris (HCC)    Vital Signs:  Temp:  [97 °F (36.1 °C)-100.2 °F (37.9 °C)] 99.7 °F (37.6 °C)  Heart Rate:  [61-77] 61  Resp:  [14-26] 22  BP: ()/(37-89) 97/62  Arterial Line BP: ()/() 108/48  FiO2 (%):  [40 %-100 %] 40 %    Physical Exam:  Extubated up in chair     Results:   Results from last 7 days   Lab Units 07/13/22  0208   WBC 10*3/mm3 10.92*   HEMOGLOBIN g/dL 10.4*   HEMATOCRIT % 30.1*   PLATELETS 10*3/mm3 176     Results from last 7 days   Lab Units 07/13/22  0208   SODIUM mmol/L 140   POTASSIUM mmol/L 4.0   CHLORIDE mmol/L 107   CO2 mmol/L 25.0   BUN mg/dL 11   CREATININE mg/dL 0.78   GLUCOSE mg/dL 133*   CALCIUM mg/dL 7.9*           Imaging Results (Last 24 Hours)     Procedure Component Value Units Date/Time    XR Chest 1 View [[id number]] Resulted: 07/13/22 0623     Updated: 07/13/22 0624    XR Chest 1 View [[id number]] Collected: 07/12/22 1613     Updated: 07/12/22 1617    Narrative:      Examination: XR CHEST 1 VW-     Date of Exam: 7/12/2022 4:00 PM     Indication: Post-Op Check Line & Tube Placement;  I25.118-Atherosclerotic heart disease of native coronary artery with  other forms of angina pectoris.     Comparison: Radiograph 07/11/2022     Technique: 1 view of the chest      Findings:  An endotracheal tube is present with the tip in the mid to distal  trachea. Enteric tube is present within the stomach. Bilateral chest  tubes are present. There is a tiny left-sided pneumothorax with degree  of pleural separation measuring approximately 3 mm. There is right  internal jugular catheter present within the tip in the upper SVC. The  heart appears mildly enlarged. There is indistinctness of the pulmonary  vasculature. Probable small bilateral pleural effusions are present.  No  acute osseous abnormality identified. Median sternotomy wires are  present.       Impression:      Mild pulmonary edema pattern with small bilateral pleural effusions. A  tiny left sided pneumothorax present with degree of pleural separation  measuring up to 3 mm.     This report was finalized on 7/12/2022 4:14 PM by Danisha Real MD.       XR Lost Needle / Instrument [[id number]] Collected: 07/12/22 1516     Updated: 07/12/22 1521    Narrative:      DATE OF EXAM: 7/12/2022 3:05 PM     PROCEDURE: XR LOST NEEDLE/INSTRUMENT-     INDICATIONS: incorrect needle count; I25.118-Atherosclerotic heart  disease of native coronary artery with other forms of angina pectoris     COMPARISON: Pre-op chest of 7/11/2022     TECHNIQUE:     FINDINGS:   Sternotomy wires and left atrial appendage exclusion clip are noted as  well as multiple vascular clips. There are right and left thoracotomy  tubes, and a faint suggestion of epicardial pacer leads. ET tube is seen  at the level of the clavicles in good position. Right IJ catheter tip is  seen in the mid SVC. No evidence of retained needle or instrument is  seen. Heart is enlarged. Vasculature is slightly cephalized. There is  mild patchy bibasilar atelectasis. No pneumothorax is seen.             Impression:         1. No evidence of retained needle or instrument.  2. Interval median sternotomy with ET tube and right IJ catheter in  satisfactory position.  3. Cardiomegaly, mild pulmonary venous hypertension and mild patchy  bibasilar atelectasis. No evidence of pneumothorax.     This report was finalized on 7/12/2022 3:18 PM by Dr. Manjinder Aldridge MD.             Assessment      Coronary artery disease of native artery of native heart with stable angina pectoris (HCC)    Hypertension    Hyperlipidemia    Anxiety    Acid reflux    TAMIKO (obstructive sleep apnea)    Depression    Bipolar 1 disorder (HCC)    PTSD (post-traumatic stress disorder)    Marijuana use     Hypothyroidism    Satisfactory progress in the early postoperative period    Plan   As above    Please note that portions of this note were completed with a voice recognition program. Efforts were made to edit the dictations, but occasionally words are mistranscribed.    Baldomero Anton MD  07/13/22  06:41 EDT

## 2022-07-13 NOTE — PLAN OF CARE
Goal Outcome Evaluation:    Neuro intact. Follows commands. Moves all extremities equally. Extubated at 0115 to 2L NC. NSR per tele. On low dose levo. Continuous amio gtt infusing at 0.5 per MD orders. Insulin gtt remains.     UOP = 530 mL    Chest tubes  1/2 = 205 mL  3/4 = 210 mL

## 2022-07-13 NOTE — PROGRESS NOTES
INTENSIVIST / PULMONARY FOLLOW UP NOTE     Hospital:  LOS: 1 day   Mr. Josh Horan, 61 y.o. male is followed for:     Coronary artery disease of native artery of native heart with stable angina pectoris (HCC)    Hypertension    Hyperlipidemia    Anxiety    Acid reflux    TAMIKO (obstructive sleep apnea)    Depression    Bipolar 1 disorder (HCC)    PTSD (post-traumatic stress disorder)    Marijuana use    Hypothyroidism          SUBJECTIVE     Diaphoretic and c/o pain    The patient's relevant past medical, surgical, family, and social history were reviewed    Allergies and medications were reviewed    ROS:  Per HPI otherwise negative          OBJECTIVE     Vital Sign Min/Max for last 24 hours:  Temp  Min: 97 °F (36.1 °C)  Max: 100.2 °F (37.9 °C)   BP  Min: 68/37  Max: 129/84   Pulse  Min: 61  Max: 77   Resp  Min: 14  Max: 26   SpO2  Min: 70 %  Max: 100 %   No data recorded     Physical Exam:  General Appearance:  No acute distress  Eyes:  No scleral icterus or pallor, pupils normal  Ears, Nose, Mouth, Throat:  Atraumatic, oropharynx clear  Neck:  Trachea midline, thyroid normal  Respiratory:  Clear to auscultation bilaterally, normal effort  Cardiovascular:  Regular rate and rhythm, no murmurs, no peripheral edema  Gastrointestinal:  Soft, non-tender, non-distended, no hepatosplenomegaly  Skin:  Normal temperature, no rash  Psychiatric:  No agitation  Neuro:  No new focal neurologic deficits observed    Telemetry:              Hemodynamics:   CVP:     PAP:     PAOP:     CO: CO (L/min): 5.9 L/min   CI: CI (L/min/m2): 2.82 L/min/m2   SVI: SVI: 39.72   SVR:       SpO2: 95 % SpO2  Min: 70 %  Max: 100 %   Device:      Flow Rate:   No data recorded         Intake/Ouptut 24 hrs (7:00AM - 6:59 AM)  Intake & Output (last 3 days)       07/10 0701 07/11 0700 07/11 0701  07/12 0700 07/12 0701 07/13 0700 07/13 0701 07/14 0700    I.V. (mL/kg)   1265.1 (12.9)     Blood   1200     NG/GT   60     IV Piggyback   1500     Total  Intake(mL/kg)   4025.1 (41.1)     Urine (mL/kg/hr)   3380 175 (0.3)    Chest Tube   670 90    Total Output   4050 265    Net   -24.9 -265                  Lines, Drains & Airways     Active LDAs     Name Placement date Placement time Site Days    Peripheral IV 04/27/22 1222 Right Antecubital 04/27/22  1222  Antecubital  75                Hematology:  Results from last 7 days   Lab Units 07/13/22 0208 07/12/22 1942 07/12/22  1558 07/12/22  1439 07/12/22  1347 07/12/22  1313 07/12/22  1247 07/12/22  1118 07/11/22  1209   WBC 10*3/mm3 10.92* 8.69 8.26  --   --   --   --   --  10.24   HEMOGLOBIN g/dL 10.4* 9.5* 10.1*  --   --   --   --   --  14.6   HEMOGLOBIN, POC g/dL  --   --   --  9.2* 11.2* 10.5* 9.5*   < >  --    HEMATOCRIT % 30.1* 26.5* 28.5*  --   --   --   --   --  42.3   HEMATOCRIT POC %  --   --   --  27* 33* 31* 28*   < >  --    PLATELETS 10*3/mm3 176 134* 120*  --   --   --   --   --  212    < > = values in this interval not displayed.     Electrolytes, Magnesium and Phosphorus:  Results from last 7 days   Lab Units 07/13/22 0208 07/12/22 1942 07/12/22  1558 07/11/22  1209   SODIUM mmol/L 140 141 140 140   CHLORIDE mmol/L 107 106 107 105   POTASSIUM mmol/L 4.0 3.7 3.6  3.6 4.1   CO2 mmol/L 25.0 25.0 22.0 26.0   MAGNESIUM mg/dL 2.4 2.4 1.6 2.0   PHOSPHORUS mg/dL 2.4* 2.8 2.5  --      Renal:  Results from last 7 days   Lab Units 07/13/22 0208 07/12/22 1942 07/12/22  1558 07/11/22  1209   CREATININE mg/dL 0.78 0.80 0.73* 0.84   BUN mg/dL 11 11 10 14     Estimated Creatinine Clearance: 113.8 mL/min (by C-G formula based on SCr of 0.78 mg/dL).  Hepatic:  Results from last 7 days   Lab Units 07/11/22  1209   ALK PHOS U/L 91   BILIRUBIN mg/dL 0.3   ALT (SGPT) U/L 12   AST (SGOT) U/L 13     Arterial Blood Gases:  Results from last 7 days   Lab Units 07/13/22  0106 07/12/22  2149 07/12/22  1635 07/12/22  1439 07/12/22  1347 07/12/22  1313 07/12/22  1247   PH, ARTERIAL pH units 7.392 7.283* 7.394 7.36 7.38 7.37  7.30*   PCO2, ARTERIAL mm Hg 40.1 52.4* 41.2  --   --   --   --    PO2 ART mm Hg 111.0* 75.8* 226.0*  --   --   --   --    FIO2 % 40 40 100  --   --   --   --        Results from last 7 days   Lab Units 07/11/22  1209   HEMOGLOBIN A1C % 5.40       No results found for: LACTATE    Relevant imaging studies and labs from 07/13/22 were reviewed    Medications (drips):            Assessment & Plan   IMPRESSION / PLAN     Inpatient Problem List:  61 y.o.male:  Active Hospital Problems    Diagnosis    • **Coronary artery disease of native artery of native heart with stable angina pectoris (HCC)      S/p CABG by Dr. Anton 7/12/22     • Hypertension    • Hyperlipidemia    • Anxiety    • Acid reflux    • TAMIKO (obstructive sleep apnea)      doesnt use machine     • Depression    • Bipolar 1 disorder (HCC)    • PTSD (post-traumatic stress disorder)    • Marijuana use    • Hypothyroidism         Hospital Course:  61 y.o.male with relevant PMH of HTN, HLP, GERD, marijuana use, hypothyroidism, TAMIKO not CPAP compliant, anxiety, PTSD, depression, bipolar DO, recently diagnosed [facility] admitted 7/12/2022 post op 5vCABG by Dr. Anton.    Impression & Plan:    Pain  PTSD  Bipolar d/o  Titrate narcotics and stool softeners .  Resume home psych meds    Mechanical Ventilation  Hypoxemia  Wean per protocol - extubated.  IS, mobilize.    Hypothyroidism  Resume levothyroxine     Stress Hyperglycemia A1c 5.4  Insulin gtt - > transition to SQ    Hypotension  Wean levophed as able    Nausea  Prn zofran, add PPI    CAD  HTN  HLD  Per Cardiology    Post op Care  Per CTS    DVT / PUD Prophylaxis  Foot pumps, SQ Heparin when ok w/ CTS, Protonix    Nutrition  Dietary Orders (From admission, onward)     Start     Ordered    07/13/22 1059  Dietary Nutrition Supplements Boost Breeze (Clear Liquid); orange  Daily With Breakfast & Dinner      Comments: Orange Boost Breeze 2x daily (Breakfast + Dinner)   Question Answer Comment   Select Supplement: Boost  Breeze (Clear Liquid)    Flavor: orange        07/13/22 1057    07/13/22 1057  DIET MESSAGE Wednesday (7/13) LUNCH - please send an orange Boost Breeze with patient's lunch meal today. Thank you, marjorie for late noticed. FERN  Once        Comments: Wednesday (7/13) LUNCH - please send an orange Boost Breeze with patient's lunch meal today. Thank you, marjorie for late noticed. FERN    07/13/22 1057    07/13/22 0712  Diet Clear Liquid  Diet Effective Now        Question:  Diet Texture / Consistency  Answer:  Clear Liquid    07/13/22 0711              Plan of care and goals reviewed with multidisciplinary team at daily rounds    High risk secondary to vasopressors, insulin gtt, IV narcotics, post op major surgery with risk factors         Norman Meléndez MD  Intensive Care Medicine  07/13/22 12:47 EDT

## 2022-07-13 NOTE — PROGRESS NOTES
Clinical Nutrition   Reason For Visit: Identified at risk by screening criteria, MST score 2+, Unintentional weight loss    Patient Name: Josh Horan  YOB: 1960  MRN: [id number]  Date of Encounter: 07/13/22 10:37 EDT  Admission date: 7/12/2022      Nutrition Assessment     Admission Problem List:  Coronary artery disease of native artery of native heart with stable angina pectoris (HCC)      Applicable PMH:  HTN, HLD  GERD  PTSD  CAD  Bipolar  Marijuana use  Lyme disease      Applicable medical tests/procedures since admission:  (7/12) s/p CABG, intubated  (7/13) extubated      PMH: He  has a past medical history of Acid reflux, Anxiety, Arthritis, Back pain, Bipolar 1 disorder (HCC), Coronary artery disease, Depression, Headache, Hiatal hernia, Hyperlipidemia, Hypertension, Hyperthyroidism, Hypothyroidism, Lyme disease, Migraine, MRSA infection, Panic attacks, PTSD (post-traumatic stress disorder), Sciatic nerve pain, Seasonal allergies, Sleep apnea, Tinnitus, and Wears glasses.   PSxH: He  has a past surgical history that includes Vasectomy; Other surgical history; Cardiac catheterization; Hernia repair (Left); and Coronary artery bypass graft (N/A, 7/12/2022).        Reported/Observed/Food/Nutrition Related History   Patient and family present during visit. Patient sitting up in bedside chair with rag on head/face. C/o nausea. Holding vomit bag in lap. Receiving PRN zofran. RD discussed with RN who plans to discuss with intensivist (try different anti-emetic medication and/or NG tube replacement for gastric decompression). RN reports patient has a large gas bubble in stomach which is likely exacerbating his nausea.    Patient and family report patient lost his appetite and began eating very poorly ~2 years ago - he was diagnosed with Lyme disease. Reports he weighed 260 lbs 2 years ago. Normal PO intake had been 2 meals daily, now eating 1 full meal daily (portions improved since beginning  MD-approved cannabis a while back ago for appetite stimulation). However, still not eating his normal amount (intake <75% of usual/normal). Lives with son who works 3rd shift, so patient sleeps during the day and is awake at night. Does not drink any ONS at home. RD advised patient/family to purchase a high protein ONS drink for home consumption not only for post-op healing, but also for improving nutritional status at home given his inadequate food intake. At this time patient agreeable to try orange Boost Breeze, strawberry Boost Plus, and kristy/van Premier Protein. Not feeling up to full liquid ONS at this time, so will begin with clear liquid ONS. Denies food allergies and difficulty chewing/swallowing.      Anthropometrics   Height: 67 in  Weight: 216 lbs (standing wt 7/11)  BMI: 33.8  BMI classification: Obese Class I: 30-34.9kg/m2   IBW: 148 lbs    UBW: 260 lbs 2 years ago per patient  Weight change: Unintentional weight loss of 44 lbs (16.9%) x 2 years.    Labs reviewed   Labs reviewed: Yes    Medications reviewed   Medications reviewed: Yes  Scheduled: antibiotic  GTT: amiodarone, D5%/KCl @ 30 ml/hr, insulin, levophed (0.02 mcg/kg/min)  PRN: zofran, potassium phosphate, sodium phosphate    Current Nutrition Prescription   PO: Diet Clear Liquid  Oral Nutrition Supplement:     Average PO intake: insufficient data    Nutrition Diagnosis     Problem Unintended weight loss   Etiology Poor appetite   Signs/Symptoms <75% of usual PO intake x 2 years, Unintentional weight loss of 44 lbs (16.9%) x 2 years       Goal:   General: Nutrition to support treatment  PO: Tolerate PO, Increase intake, Advace diet as medically appropriate    Intervention   Intervention: Follow treatment progress, Care plan reviewed, Interview for preferences, Encourage intake, Supplement provided, Education provided    -Ordered orange Boost Breeze 2x daily while on clear liquid diet and experiencing poor PO tolerance.  -Will order Boost Plus  (strawberry) 2x daily and Premier Protein 1x daily.    Monitoring/Evaluation:   Monitoring/Evaluation: Per protocol, I&O, PO intake, Supplement intake, Pertinent labs, Weight, Skin status, Symptoms, Hemodynamic stability    Marla Sanders RD  Time Spent: 45 min

## 2022-07-13 NOTE — CONSULTS
Cardiology Consult - [facility]    Josh Horan     S260/1  1960  [address] [place] [id number]         Admission Date:  7/12/2022    Consultation Date:  07/13/22        PCP:  Jerrica Madrid APRN  Referring MD:  Baldomero Anton MD  Consulting MD:  Dr. Deandre Horowitz  Primary Cardiologist:  Dr. Salguero        CC:  MV CAD    Reason for Consult: Post Operative management of blood pressure/arrhythmias.        Coronary artery disease of native artery of native heart with stable angina pectoris (HCC)    Hypertension    Hyperlipidemia    Anxiety    Acid reflux    TAMIKO (obstructive sleep apnea)    Depression    Bipolar 1 disorder (HCC)    PTSD (post-traumatic stress disorder)    Marijuana use    Hypothyroidism      Allergies:  is allergic to adhesive tape.    Medications Prior to Admission   Medication Sig Dispense Refill Last Dose   • aspirin 81 MG EC tablet Take 81 mg by mouth Daily.   7/12/2022 at Unknown time   • atorvastatin (LIPITOR) 20 MG tablet Take 20 mg by mouth Daily.   7/12/2022 at Unknown time   • busPIRone (BUSPAR) 10 MG tablet Take 10 mg by mouth 3 (Three) Times a Day.   7/12/2022 at Unknown time   • carvedilol (COREG) 6.25 MG tablet Take 1 tablet by mouth 2 (Two) Times a Day. 60 tablet 5 7/12/2022 at Unknown time   • cetirizine (zyrTEC) 10 MG tablet Take 10 mg by mouth Daily.   7/12/2022 at Unknown time   • DULoxetine (CYMBALTA) 60 MG capsule Take 60 mg by mouth Daily.   7/12/2022 at Unknown time   • Ergocalciferol (VITAMIN D2 PO) Take  by mouth.   Past Month at Unknown time   • ibuprofen (ADVIL,MOTRIN) 800 MG tablet Take 800 mg by mouth Every 6 (Six) Hours As Needed for Mild Pain .   7/10/2022 at Unknown time   • levothyroxine (SYNTHROID, LEVOTHROID) 50 MCG tablet Take 50 mcg by mouth Daily. 150 mcg daily   7/12/2022 at Unknown time   • losartan (COZAAR) 100 MG tablet Take 100 mg by mouth Daily.   7/11/2022 at Unknown time   • omeprazole (priLOSEC) 40 MG  capsule Take 40 mg by mouth Daily.   7/12/2022 at Unknown time   • QUEtiapine (SEROquel) 50 MG tablet Take 50 mg by mouth Every Night.   7/10/2022 at Unknown time   • tiZANidine (ZANAFLEX) 4 MG tablet Take 4 mg by mouth At Night As Needed for Muscle Spasms.   7/11/2022 at Unknown time   • vitamin B-12 (CYANOCOBALAMIN) 1000 MCG tablet Take 1,000 mcg by mouth Daily.   Past Week at Unknown time   • Clopidogrel Bisulfate (PLAVIX PO) Take  by mouth. Ld 6th   7/10/2022 at 0700   • nitroglycerin (NITROSTAT) 0.4 MG SL tablet 1 under the tongue as needed for angina, may repeat q5mins for up three doses 25 tablet 3        amiodarone, 0.5 mg/min, Last Rate: 0.5 mg/min (07/13/22 0600)  dexmedetomidine, 0.2-1.5 mcg/kg/hr, Last Rate: Stopped (07/13/22 0200)  dextrose 5 % with KCl 20 mEq, 30 mL/hr, Last Rate: 30 mL/hr (07/13/22 0600)  DOBUTamine, 2-20 mcg/kg/min  DOPamine, 2-20 mcg/kg/min  EPINEPHrine, 0.02-0.3 mcg/kg/min  insulin, 0-100 Units/hr, Last Rate: 0.8 Units/hr (07/13/22 0713)  niCARdipine, 5-15 mg/hr  nitroglycerin, 5-200 mcg/min, Last Rate: Stopped (07/12/22 1958)  norepinephrine, 0.02-0.3 mcg/kg/min, Last Rate: 0.02 mcg/kg/min (07/13/22 0600)  phenylephrine, 0.5-3 mcg/kg/min  propofol, 5-50 mcg/kg/min          HPI:  Josh Horan is a 62 yo CM with PMHx HTN, HLP, hypothyroidism, Bipolar Disorder, PTSD, anxiety, acid reflux, TAMIKO, marijuana use.  Presents to PCP with 3 month history of progressive dyspnea, exertional chest pain.  He subsequently underwent cardiac catheterization Dr. Salguero and found to have multi vessel disease.  He was referred to CTS for surgical consultation.  Yesterday he underwent elective CABG, Dr. Anton.  Cardiology has been asked to follow in consultation post operatively for management of blood pressure and any arrhythmias that may arise.    He has been extubated and is currently sitting up in chair.  Report mild nausea - no emesis and pain in sternal area.  Maintaining NSR.  Still on  "Jemal.        Social History     Socioeconomic History   • Marital status: Single   • Number of children: 2   Tobacco Use   • Smoking status: Never Smoker   • Smokeless tobacco: Never Used   Vaping Use   • Vaping Use: Never used   Substance and Sexual Activity   • Alcohol use: Never   • Drug use: Yes     Frequency: 7.0 times per week     Types: Marijuana   • Sexual activity: Defer     Family History   Problem Relation Age of Onset   • Hypertension Mother    • Heart disease Mother    • Cancer Mother    • Heart disease Father    • Heart attack Father      Past Surgical History:   Procedure Laterality Date   • CARDIAC CATHETERIZATION      6/24/2022 Dr. Salguero   • HERNIA REPAIR Left     umbilical hernia repair - pt unsure about mesh   • OTHER SURGICAL HISTORY      Lymph node removal neck   • VASECTOMY       ROS: Pertinent items are noted in HPI, all other systems reviewed and negative     Objective     height is 170.2 cm (67\") and weight is 98 kg (216 lb). His bladder temperature is 99.7 °F (37.6 °C). His blood pressure is 97/62 and his pulse is 61. His respiration is 22 and oxygen saturation is 99%.      Intake/Output Summary (Last 24 hours) at 7/13/2022 0827  Last data filed at 7/13/2022 0600  Gross per 24 hour   Intake 4025.09 ml   Output 4050 ml   Net -24.91 ml     Intake/Output                 07/12/22 0701 - 07/13/22 0700     [id number] 2605-3202 Total              Intake    I.V.  402  863.1 1265.1    Blood  1200  -- 1200    Volume (Transfuse RBC) 350 -- 350    Volume (Transfuse RBC) 350 -- 350    Volume (Transfuse Pheresed Platelets) 250 -- 250    Volume (Transfuse Pheresed Platelets) 250 -- 250    NG/GT  --  60 60    IV Piggyback  1500  -- 1500    Total Intake 3102 923.1 4025.1       Output    Urine  2850  530 3380    Chest Tube  255  415 670    Total Output 3105 945 4050             07/12/22  0936   Weight: 98 kg (216 lb)          Physical Exam:  General Appearance:    Alert, cooperative, in no acute distress "   Head:    Normocephalic, without obvious abnormality, atraumatic   Eyes:            Lids and lashes normal, conjunctivae and sclerae normal, no   icterus, no pallor, corneas clear, PERRLA   Ears:    Ears appear intact with no abnormalities noted   Throat:   No oral lesions, no thrush, oral mucosa moist   Neck:   No adenopathy, supple, trachea midline, no thyromegaly, no carotid bruit, no JVD   Back:     No kyphosis present, no scoliosis present, no skin lesions,    erythema or scars, no tenderness to percussion or                   palpation, range of motion normal   Lungs:     Clear to auscultation,respirations regular, even and               unlabored    Heart:    Regular rhythm and normal rate, normal S1 and S2, no         murmur, no gallop, small rub, no click   Abdomen:     Decreased bowel sounds, no masses, no organomegaly, soft nontender, nondistended, no guarding, no rebound   Tenderness, mildly obese.   Extremities:   Moves all extremities well,  no cyanosis, no redness, no edema.  ACE on RLE   Pulses:   Pulses palpable and equal bilaterally   Skin:   No bleeding, bruising or rash   Lymph nodes:   No palpable adenopathy   Neurologic:   Cranial nerves 2 - 12 grossly intact, sensation intact, DTR     present and equal bilaterally          Results Review:  I personally reviewed the patient's clinical results.  Results from last 7 days   Lab Units 07/13/22  0208   WBC 10*3/mm3 10.92*   HEMOGLOBIN g/dL 10.4*   HEMATOCRIT % 30.1*   PLATELETS 10*3/mm3 176     Results from last 7 days   Lab Units 07/13/22  0208 07/12/22  1558 07/11/22  1209   SODIUM mmol/L 140   < > 140   POTASSIUM mmol/L 4.0   < > 4.1   CHLORIDE mmol/L 107   < > 105   CO2 mmol/L 25.0   < > 26.0   BUN mg/dL 11   < > 14   CREATININE mg/dL 0.78   < > 0.84   CALCIUM mg/dL 7.9*   < > 9.2   BILIRUBIN mg/dL  --   --  0.3   ALK PHOS U/L  --   --  91   ALT (SGPT) U/L  --   --  12   AST (SGOT) U/L  --   --  13   GLUCOSE mg/dL 133*   < > 111*    < > =  values in this interval not displayed.       Results from last 7 days   Lab Units 07/13/22  0208 07/12/22  1558 07/11/22  1209   INR  1.20* 1.44* 0.97                         Radiology:  Imaging Results (Last 72 Hours)     Procedure Component Value Units Date/Time    XR Chest 1 View [[id number]] Resulted: 07/13/22 0623     Updated: 07/13/22 0624    XR Chest 1 View [[id number]] Collected: 07/12/22 1613     Updated: 07/12/22 1617    Narrative:      Examination: XR CHEST 1 VW-     Date of Exam: 7/12/2022 4:00 PM     Indication: Post-Op Check Line & Tube Placement;  I25.118-Atherosclerotic heart disease of native coronary artery with  other forms of angina pectoris.     Comparison: Radiograph 07/11/2022     Technique: 1 view of the chest      Findings:  An endotracheal tube is present with the tip in the mid to distal  trachea. Enteric tube is present within the stomach. Bilateral chest  tubes are present. There is a tiny left-sided pneumothorax with degree  of pleural separation measuring approximately 3 mm. There is right  internal jugular catheter present within the tip in the upper SVC. The  heart appears mildly enlarged. There is indistinctness of the pulmonary  vasculature. Probable small bilateral pleural effusions are present. No  acute osseous abnormality identified. Median sternotomy wires are  present.       Impression:      Mild pulmonary edema pattern with small bilateral pleural effusions. A  tiny left sided pneumothorax present with degree of pleural separation  measuring up to 3 mm.     This report was finalized on 7/12/2022 4:14 PM by Danisha Real MD.       XR Lost Needle / Instrument [[id number]] Collected: 07/12/22 1516     Updated: 07/12/22 1521    Narrative:      DATE OF EXAM: 7/12/2022 3:05 PM     PROCEDURE: XR LOST NEEDLE/INSTRUMENT-     INDICATIONS: incorrect needle count; I25.118-Atherosclerotic heart  disease of native coronary artery with other forms of angina pectoris     COMPARISON: Pre-op  chest of 7/11/2022     TECHNIQUE:     FINDINGS:   Sternotomy wires and left atrial appendage exclusion clip are noted as  well as multiple vascular clips. There are right and left thoracotomy  tubes, and a faint suggestion of epicardial pacer leads. ET tube is seen  at the level of the clavicles in good position. Right IJ catheter tip is  seen in the mid SVC. No evidence of retained needle or instrument is  seen. Heart is enlarged. Vasculature is slightly cephalized. There is  mild patchy bibasilar atelectasis. No pneumothorax is seen.             Impression:         1. No evidence of retained needle or instrument.  2. Interval median sternotomy with ET tube and right IJ catheter in  satisfactory position.  3. Cardiomegaly, mild pulmonary venous hypertension and mild patchy  bibasilar atelectasis. No evidence of pneumothorax.     This report was finalized on 7/12/2022 3:18 PM by Dr. Manjinder Aldridge MD.               Tele:  NSR        Assessment & Plan     1.  Essential  Hypertension   -  Currently on mild pressor support post operatively.   -  Lopressor 12.5mg BID ordered.  Adjust accordingly    2.  Hyperlipidemia   -  Continue high intensity statin, appropriate diet    3.  Coronary Artery Disease   -  Presents with 3 months progressive dyspnea and chest pain.  MV CAD by [facility].   -  EF 50-55% by echo 4/2022   -  POD 1 p CABG, Dr. Anton:  LIMA-LAD, SVG-RCA, SVG- DX of LAD, SVG- OM1-OM2.  REGINA ligation with 35 atrial clip.   -  Continue support.    4.  Hypothyroidism   -  Currently on no supplementation.   -  Check labs.      62 yo CM with HTN, HLP, TAMIKO, hypothyroid, marijuana use, PTSD, Bipolar with progressive angina and dyspnea found to have MV CAD by [facility].  Now POD 1 after CABG, normal EF.  Extubated and out of bed, still requiring low dose pressor support.  Watch rhythm, ambulate, continue to monitor.      I discussed the patient's findings and my recommendations with the patient, any present family members, and the  nursing staff.  Deandre Horowitz MD saw and examined patient, verified hx and PE, read all radiographic studies, reviewed labs and micro data, and formulated dx, plan for treatment and all medical decision making.      Jeannine Robison PA-C, working with Deandre Horowitz MD  07/13/22 08:27 EDT

## 2022-07-13 NOTE — NURSING NOTE
Patient stable post op on Amio and low dose nitro. Minimal bleeding from chest tubes. Labs stable. Mag replaced. Insulin gtt started. 1500 albumin given. Patient voided approx 3 liters. SR with ST elevation. ST elevation improved. Femoral sheath removed- no complications. Ready to wean sedation (patient only on precedex) for extubation- on first attempt, patient's body was tense, he was biting tube and bearing down causing his blood pressure to drop and not to pull volumes on vent- PRN pain meds helped calm patient. Continue to try to wean from vent.   Family updated at bedside.

## 2022-07-13 NOTE — THERAPY EVALUATION
Patient Name: Josh Horan  : 1960    MRN: [id number]                              Today's Date: 2022       Admit Date: 2022    Visit Dx:     ICD-10-CM ICD-9-CM   1. Coronary artery disease of native artery of native heart with stable angina pectoris (HCC)  I25.118 414.01     413.9     Patient Active Problem List   Diagnosis   • Coronary artery disease of native artery of native heart with stable angina pectoris (HCC)   • Abnormal findings on diagnostic imaging of heart and coronary circulation   • Hypertension   • Hyperlipidemia   • Anxiety   • Acid reflux   • TAMIKO (obstructive sleep apnea)   • Depression   • Bipolar 1 disorder (HCC)   • PTSD (post-traumatic stress disorder)   • Marijuana use   • Hypothyroidism     Past Medical History:   Diagnosis Date   • Acid reflux    • Anxiety    • Arthritis    • Back pain    • Bipolar 1 disorder (HCC)    • Coronary artery disease    • Depression    • Headache    • Hiatal hernia    • Hyperlipidemia    • Hypertension    • Hyperthyroidism    • Hypothyroidism    • Lyme disease    • Migraine    • MRSA infection     1.5-2 years ago- back   • Panic attacks    • PTSD (post-traumatic stress disorder)    • Sciatic nerve pain    • Seasonal allergies    • Sleep apnea     doesnt use machine   • Tinnitus    • Wears glasses      Past Surgical History:   Procedure Laterality Date   • CARDIAC CATHETERIZATION      2022 Dr. Salguero   • CORONARY ARTERY BYPASS GRAFT N/A 2022    Procedure: MEDIAN STERNOTOMY CORONARY ARTERY BYPASS GRAFTING X5 , UTILIZING THE LEFT INTERNAL MAMMERY GRAFT, ENDOSCOPIC VEIN HARVEST OF THE GREATER RIGHT SAPHENOUS VEIN WITH EXPLORATION OF THE LEFT LEG;  Surgeon: Baldomero Anton MD;  Location: [facility];  Service: Cardiothoracic;  Laterality: N/A;   • HERNIA REPAIR Left     umbilical hernia repair - pt unsure about mesh   • OTHER SURGICAL HISTORY      Lymph node removal neck   • VASECTOMY        General Information     Row Name 22 1326           Physical Therapy Time and Intention    Document Type evaluation  -KG     Mode of Treatment physical therapy  -KG     Row Name 07/13/22 1326          General Information    Patient Profile Reviewed yes  -KG     Prior Level of Function independent:;all household mobility;gait;transfer;ADL's;dressing;bathing  -KG     Existing Precautions/Restrictions cardiac;fall;oxygen therapy device and L/min;sternal  -KG     Barriers to Rehab medically complex  -KG     Row Name 07/13/22 1326          Living Environment    People in Home child(gustavo), adult  -KG     Row Name 07/13/22 1326          Home Main Entrance    Number of Stairs, Main Entrance none  -KG     Row Name 07/13/22 1326          Stairs Within Home, Primary    Number of Stairs, Within Home, Primary none  -KG     Row Name 07/13/22 1326          Cognition    Orientation Status (Cognition) oriented x 3  -KG     Row Name 07/13/22 1326          Safety Issues, Functional Mobility    Safety Issues Affecting Function (Mobility) awareness of need for assistance;insight into deficits/self-awareness;safety precaution awareness;safety precautions follow-through/compliance  -KG     Impairments Affecting Function (Mobility) balance;coordination;endurance/activity tolerance;postural/trunk control;pain;shortness of breath;strength  -KG           User Key  (r) = Recorded By, (t) = Taken By, (c) = Cosigned By    Initials Name Provider Type    KG Gabriella Pelaez, PT Physical Therapist               Mobility     Row Name 07/13/22 1327          Bed Mobility    Comment, (Bed Mobility) UIC  -KG     Row Name 07/13/22 1327          Transfers    Comment, (Transfers) VC's for sequencing and safe hand placement to maintain sternal precautions.  -KG     Row Name 07/13/22 1327          Sit-Stand Transfer    Sit-Stand [place] (Transfers) minimum assist (75% patient effort);2 person assist;verbal cues  -KG     Row Name 07/13/22 1327          Gait/Stairs (Locomotion)    [place]  Level (Gait) minimum assist (75% patient effort);2 person assist;verbal cues  -KG     Assistive Device (Gait) other (see comments)  B UE support on tele monitor  -KG     Distance in Feet (Gait) 70  -KG     Deviations/Abnormal Patterns (Gait) duke decreased;stride length decreased  -KG     Bilateral Gait Deviations forward flexed posture  -KG     Comment, (Gait/Stairs) Pt demonstrated step through gait pattern with slow duke and decreased step length. VC's for upright posture. Pt required two standing rest breaks due to increased SOA and incisional pain. Increased encouragement required for continued forward ambulation.  -KG           User Key  (r) = Recorded By, (t) = Taken By, (c) = Cosigned By    Initials Name Provider Type    Gabriella See PT Physical Therapist               Obj/Interventions     Row Name 07/13/22 1328          Range of Motion Comprehensive    General Range of Motion no range of motion deficits identified  -KG     Comment, General Range of Motion BLE WFL  -KG     Row Name 07/13/22 1328          Strength Comprehensive (MMT)    Comment, General Manual Muscle Testing (MMT) Assessment BLE grossly 4-/5  -KG     Row Name 07/13/22 1328          Balance    Balance Assessment sitting static balance;standing static balance;standing dynamic balance  -KG     Static Sitting Balance standby assist  -KG     Position, Sitting Balance unsupported;sitting in chair  -KG     Static Standing Balance minimal assist  -KG     Dynamic Standing Balance minimal assist;2-person assist  -KG     Position/Device Used, Standing Balance supported  -KG     Row Name 07/13/22 1328          Sensory Assessment (Somatosensory)    Sensory Assessment (Somatosensory) LE sensation intact  -KG           User Key  (r) = Recorded By, (t) = Taken By, (c) = Cosigned By    Initials Name Provider Type    Gabriella See PT Physical Therapist               Goals/Plan     Row Name 07/13/22 1330          Bed Mobility  Goal 1 (PT)    Activity/Assistive Device (Bed Mobility Goal 1, PT) sit to supine;supine to sit  -KG     [place] Level/Cues Needed (Bed Mobility Goal 1, PT) independent  -KG     Time Frame (Bed Mobility Goal 1, PT) 2 weeks  -KG     Progress/Outcomes (Bed Mobility Goal 1, PT) goal ongoing  -KG     Row Name 07/13/22 8920          Transfer Goal 1 (PT)    Activity/Assistive Device (Transfer Goal 1, PT) sit-to-stand/stand-to-sit;bed-to-chair/chair-to-bed  -KG     [place] Level/Cues Needed (Transfer Goal 1, PT) independent  -KG     Time Frame (Transfer Goal 1, PT) 2 weeks  -KG     Progress/Outcome (Transfer Goal 1, PT) goal ongoing  -KG     Row Name 07/13/22 1330          Gait Training Goal 1 (PT)    Activity/Assistive Device (Gait Training Goal 1, PT) gait (walking locomotion)  -KG     [place] Level (Gait Training Goal 1, PT) independent  -KG     Distance (Gait Training Goal 1, PT) 400 feet  -KG     Time Frame (Gait Training Goal 1, PT) 2 weeks  -KG     Progress/Outcome (Gait Training Goal 1, PT) goal ongoing  -KG     Row Name 07/13/22 133          Therapy Assessment/Plan (PT)    Planned Therapy Interventions (PT) balance training;bed mobility training;gait training;strengthening;transfer training  -KG           User Key  (r) = Recorded By, (t) = Taken By, (c) = Cosigned By    Initials Name Provider Type    KG Gabriella Pelaez N, PT Physical Therapist               Clinical Impression     Row Name 07/13/22 8108          Pain    Pretreatment Pain Rating 5/10  -KG     Posttreatment Pain Rating 6/10  -KG     Pain Location incisional  -KG     Pain Location - chest  -KG     Pain Intervention(s) Repositioned;Ambulation/increased activity  -KG     Row Name 07/13/22 1321          Plan of Care Review    Plan of Care Reviewed With patient  -KG     Outcome Evaluation PT initial evaluation completed for pt s/p CABG x5 presenting with generalized weakness, SOA, incisional pain, and decreased functional mobility. Pt  ambulated 70ft with Tayo x2 and B UE support on tele monitor. Pt's decreased independence warrants PT skilled care. Recommend D/C home with assistance.  -KG     Row Name 07/13/22 1329          Therapy Assessment/Plan (PT)    Patient/Family Therapy Goals Statement (PT) return to PLOF  -KG     Rehab Potential (PT) good, to achieve stated therapy goals  -KG     Criteria for Skilled Interventions Met (PT) yes;skilled treatment is necessary  -KG     Therapy Frequency (PT) daily  -KG     Row Name 07/13/22 1329          Vital Signs    Pre Systolic BP Rehab 115  -KG     Pre Treatment Diastolic BP 75  -KG     Post Systolic BP Rehab 119  -KG     Post Treatment Diastolic BP 70  -KG     Pretreatment Heart Rate (beats/min) 70  -KG     Posttreatment Heart Rate (beats/min) 76  -KG     Pre SpO2 (%) 93  -KG     O2 Delivery Pre Treatment supplemental O2  -KG     Post SpO2 (%) 94  -KG     O2 Delivery Post Treatment supplemental O2  -KG     Pre Patient Position Sitting  -KG     Intra Patient Position Standing  -KG     Post Patient Position Sitting  -KG     Row Name 07/13/22 1329          Positioning and Restraints    Pre-Treatment Position sitting in chair/recliner  -KG     Post Treatment Position chair  -KG     In Chair notified nsg;reclined;call light within reach;encouraged to call for assist;with family/caregiver;RUE elevated;LUE elevated;legs elevated  -KG           User Key  (r) = Recorded By, (t) = Taken By, (c) = Cosigned By    Initials Name Provider Type    KG Gabriella Pelaez, PT Physical Therapist               Outcome Measures     Row Name 07/13/22 1331 07/13/22 0809       How much help from another person do you currently need...    Turning from your back to your side while in flat bed without using bedrails? 2  -KG 2  -GB    Moving from lying on back to sitting on the side of a flat bed without bedrails? 2  -KG 2  -GB    Moving to and from a bed to a chair (including a wheelchair)? 3  -KG 2  -GB    Standing up from a  chair using your arms (e.g., wheelchair, bedside chair)? 3  -KG 2  -GB    Climbing 3-5 steps with a railing? 2  -KG 2  -GB    To walk in hospital room? 3  -KG 2  -GB    AM-PAC 6 Clicks Score (PT) 15  -KG 12  -GB    Highest level of mobility 4 --> Transferred to chair/commode  -KG 4 --> Transferred to chair/commode  -GB    Row Name 07/13/22 1331          Functional Assessment    Outcome Measure Options AM-PAC 6 Clicks Basic Mobility (PT)  -KG           User Key  (r) = Recorded By, (t) = Taken By, (c) = Cosigned By    Initials Name Provider Type    GB Kassandra De Paz, RN Registered Nurse    KG Gabriella Pelaez, PT Physical Therapist                             Physical Therapy Education                 Title: PT OT SLP Therapies (In Progress)     Topic: Physical Therapy (In Progress)     Point: Mobility training (In Progress)     Learning Progress Summary           Patient Acceptance, E, NR by KG at 7/13/2022 1007                   Point: Home exercise program (In Progress)     Learning Progress Summary           Patient Acceptance, E, NR by KG at 7/13/2022 1007                   Point: Body mechanics (In Progress)     Learning Progress Summary           Patient Acceptance, E, NR by KG at 7/13/2022 1007                   Point: Precautions (In Progress)     Learning Progress Summary           Patient Acceptance, E, NR by KG at 7/13/2022 1007                               User Key     Initials Effective Dates Name Provider Type Discipline    KG 05/22/20 -  Gabriella Pelaez, PT Physical Therapist PT              PT Recommendation and Plan  Planned Therapy Interventions (PT): balance training, bed mobility training, gait training, strengthening, transfer training  Plan of Care Reviewed With: patient  Outcome Evaluation: PT initial evaluation completed for pt s/p CABG x5 presenting with generalized weakness, SOA, incisional pain, and decreased functional mobility. Pt ambulated 70ft with Tayo x2 and B UE support  on tele monitor. Pt's decreased independence warrants PT skilled care. Recommend D/C home with assistance.     Time Calculation:    PT Charges     Row Name 07/13/22 1007             Time Calculation    Start Time 1007  -KG      PT Received On 07/13/22  -KG      PT Goal Re-Cert Due Date 07/23/22  -KG              Untimed Charges    PT Eval/Re-eval Minutes 47  -KG              Total Minutes    Untimed Charges Total Minutes 47  -KG       Total Minutes 47  -KG            User Key  (r) = Recorded By, (t) = Taken By, (c) = Cosigned By    Initials Name Provider Type    KG Gabriella ePlaez, PT Physical Therapist              Therapy Charges for Today     Code Description Service Date Service Provider Modifiers Qty    43068250424 HC PT EVAL MOD COMPLEXITY 4 7/13/2022 Gabriella Pelaez, PT GP 1    15641966516 HC PT THER SUPP EA 15 MIN 7/13/2022 Gabriella Pelaze, PT GP 3          PT G-Codes  Outcome Measure Options: AM-PAC 6 Clicks Basic Mobility (PT)  AM-PAC 6 Clicks Score (PT): 15    Ania Pelaez PT  7/13/2022

## 2022-07-13 NOTE — PLAN OF CARE
Goal Outcome Evaluation:  Plan of Care Reviewed With: patient           Outcome Evaluation: PT initial evaluation completed for pt s/p CABG x5 presenting with generalized weakness, SOA, incisional pain, and decreased functional mobility. Pt ambulated 70ft with Tayo x2 and B UE support on tele monitor. Pt's decreased independence warrants PT skilled care. Recommend D/C home with assistance.

## 2022-07-14 ENCOUNTER — APPOINTMENT (OUTPATIENT)
Dept: GENERAL RADIOLOGY | Facility: HOSPITAL | Age: 62
End: 2022-07-14

## 2022-07-14 LAB
ALBUMIN SERPL-MCNC: 4.1 G/DL (ref 3.5–5.2)
ALBUMIN/GLOB SERPL: 2.2 G/DL
ALP SERPL-CCNC: 53 U/L (ref 39–117)
ALT SERPL W P-5'-P-CCNC: 11 U/L (ref 1–41)
ANION GAP SERPL CALCULATED.3IONS-SCNC: 7 MMOL/L (ref 5–15)
AST SERPL-CCNC: 36 U/L (ref 1–40)
BASOPHILS # BLD AUTO: 0.03 10*3/MM3 (ref 0–0.2)
BASOPHILS NFR BLD AUTO: 0.2 % (ref 0–1.5)
BILIRUB SERPL-MCNC: 1.1 MG/DL (ref 0–1.2)
BUN SERPL-MCNC: 10 MG/DL (ref 6–20)
BUN/CREAT SERPL: 14.7 (ref 7–25)
CALCIUM SPEC-SCNC: 8.4 MG/DL (ref 8.6–10.5)
CHLORIDE SERPL-SCNC: 103 MMOL/L (ref 98–107)
CO2 SERPL-SCNC: 28 MMOL/L (ref 22–29)
CREAT SERPL-MCNC: 0.68 MG/DL (ref 0.76–1.27)
DEPRECATED RDW RBC AUTO: 43.3 FL (ref 37–54)
EGFRCR SERPLBLD CKD-EPI 2021: 107.1 ML/MIN/1.73
EOSINOPHIL # BLD AUTO: 0.08 10*3/MM3 (ref 0–0.4)
EOSINOPHIL NFR BLD AUTO: 0.6 % (ref 0.3–6.2)
ERYTHROCYTE [DISTWIDTH] IN BLOOD BY AUTOMATED COUNT: 13.1 % (ref 12.3–15.4)
GLOBULIN UR ELPH-MCNC: 1.9 GM/DL
GLUCOSE BLDC GLUCOMTR-MCNC: 145 MG/DL (ref 70–130)
GLUCOSE SERPL-MCNC: 114 MG/DL (ref 65–99)
HCT VFR BLD AUTO: 28.9 % (ref 37.5–51)
HGB BLD-MCNC: 9.7 G/DL (ref 13–17.7)
IMM GRANULOCYTES # BLD AUTO: 0.06 10*3/MM3 (ref 0–0.05)
IMM GRANULOCYTES NFR BLD AUTO: 0.5 % (ref 0–0.5)
LYMPHOCYTES # BLD AUTO: 2.21 10*3/MM3 (ref 0.7–3.1)
LYMPHOCYTES NFR BLD AUTO: 17.9 % (ref 19.6–45.3)
MAGNESIUM SERPL-MCNC: 2.5 MG/DL (ref 1.6–2.6)
MCH RBC QN AUTO: 30.2 PG (ref 26.6–33)
MCHC RBC AUTO-ENTMCNC: 33.6 G/DL (ref 31.5–35.7)
MCV RBC AUTO: 90 FL (ref 79–97)
MONOCYTES # BLD AUTO: 1.26 10*3/MM3 (ref 0.1–0.9)
MONOCYTES NFR BLD AUTO: 10.2 % (ref 5–12)
NEUTROPHILS NFR BLD AUTO: 70.6 % (ref 42.7–76)
NEUTROPHILS NFR BLD AUTO: 8.73 10*3/MM3 (ref 1.7–7)
NRBC BLD AUTO-RTO: 0 /100 WBC (ref 0–0.2)
PHOSPHATE SERPL-MCNC: 2.2 MG/DL (ref 2.5–4.5)
PHOSPHATE SERPL-MCNC: 2.4 MG/DL (ref 2.5–4.5)
PLATELET # BLD AUTO: 141 10*3/MM3 (ref 140–450)
PMV BLD AUTO: 11.2 FL (ref 6–12)
POTASSIUM SERPL-SCNC: 3.9 MMOL/L (ref 3.5–5.2)
PROT SERPL-MCNC: 6 G/DL (ref 6–8.5)
QT INTERVAL: 320 MS
QTC INTERVAL: 448 MS
RBC # BLD AUTO: 3.21 10*6/MM3 (ref 4.14–5.8)
SODIUM SERPL-SCNC: 138 MMOL/L (ref 136–145)
T4 FREE SERPL-MCNC: 1.91 NG/DL (ref 0.93–1.7)
TSH SERPL DL<=0.05 MIU/L-ACNC: 0.22 UIU/ML (ref 0.27–4.2)
WBC NRBC COR # BLD: 12.37 10*3/MM3 (ref 3.4–10.8)

## 2022-07-14 PROCEDURE — 85025 COMPLETE CBC W/AUTO DIFF WBC: CPT | Performed by: INTERNAL MEDICINE

## 2022-07-14 PROCEDURE — 84100 ASSAY OF PHOSPHORUS: CPT | Performed by: INTERNAL MEDICINE

## 2022-07-14 PROCEDURE — 84439 ASSAY OF FREE THYROXINE: CPT | Performed by: PHYSICIAN ASSISTANT

## 2022-07-14 PROCEDURE — 84100 ASSAY OF PHOSPHORUS: CPT | Performed by: THORACIC SURGERY (CARDIOTHORACIC VASCULAR SURGERY)

## 2022-07-14 PROCEDURE — 93005 ELECTROCARDIOGRAM TRACING: CPT | Performed by: THORACIC SURGERY (CARDIOTHORACIC VASCULAR SURGERY)

## 2022-07-14 PROCEDURE — 83735 ASSAY OF MAGNESIUM: CPT | Performed by: INTERNAL MEDICINE

## 2022-07-14 PROCEDURE — 97530 THERAPEUTIC ACTIVITIES: CPT

## 2022-07-14 PROCEDURE — 71045 X-RAY EXAM CHEST 1 VIEW: CPT

## 2022-07-14 PROCEDURE — 25010000002 AMIODARONE IN DEXTROSE 5% 150-4.21 MG/100ML-% SOLUTION: Performed by: NURSE PRACTITIONER

## 2022-07-14 PROCEDURE — 99024 POSTOP FOLLOW-UP VISIT: CPT | Performed by: THORACIC SURGERY (CARDIOTHORACIC VASCULAR SURGERY)

## 2022-07-14 PROCEDURE — 0 CEFUROXIME SODIUM 1.5 G RECONSTITUTED SOLUTION: Performed by: THORACIC SURGERY (CARDIOTHORACIC VASCULAR SURGERY)

## 2022-07-14 PROCEDURE — 80053 COMPREHEN METABOLIC PANEL: CPT | Performed by: INTERNAL MEDICINE

## 2022-07-14 PROCEDURE — 82962 GLUCOSE BLOOD TEST: CPT

## 2022-07-14 PROCEDURE — 93010 ELECTROCARDIOGRAM REPORT: CPT | Performed by: INTERNAL MEDICINE

## 2022-07-14 PROCEDURE — 25010000002 MORPHINE PER 10 MG: Performed by: THORACIC SURGERY (CARDIOTHORACIC VASCULAR SURGERY)

## 2022-07-14 PROCEDURE — 84443 ASSAY THYROID STIM HORMONE: CPT | Performed by: PHYSICIAN ASSISTANT

## 2022-07-14 PROCEDURE — 99232 SBSQ HOSP IP/OBS MODERATE 35: CPT | Performed by: INTERNAL MEDICINE

## 2022-07-14 PROCEDURE — 25010000002 AMIODARONE IN DEXTROSE 5% 360-4.14 MG/200ML-% SOLUTION: Performed by: NURSE PRACTITIONER

## 2022-07-14 PROCEDURE — 93005 ELECTROCARDIOGRAM TRACING: CPT | Performed by: NURSE PRACTITIONER

## 2022-07-14 RX ORDER — ERGOCALCIFEROL 1.25 MG/1
50000 CAPSULE ORAL WEEKLY
COMMUNITY

## 2022-07-14 RX ORDER — METOPROLOL TARTRATE 50 MG/1
50 TABLET, FILM COATED ORAL ONCE
Status: COMPLETED | OUTPATIENT
Start: 2022-07-14 | End: 2022-07-14

## 2022-07-14 RX ORDER — AMIODARONE HYDROCHLORIDE 200 MG/1
200 TABLET ORAL DAILY
Status: DISCONTINUED | OUTPATIENT
Start: 2022-08-05 | End: 2022-07-15

## 2022-07-14 RX ORDER — AMIODARONE HYDROCHLORIDE 200 MG/1
200 TABLET ORAL EVERY 12 HOURS
Status: DISCONTINUED | OUTPATIENT
Start: 2022-07-22 | End: 2022-07-15

## 2022-07-14 RX ORDER — AMIODARONE HYDROCHLORIDE 200 MG/1
200 TABLET ORAL EVERY 8 HOURS
Status: DISCONTINUED | OUTPATIENT
Start: 2022-07-15 | End: 2022-07-15

## 2022-07-14 RX ORDER — LEVOTHYROXINE SODIUM 0.1 MG/1
100 TABLET ORAL DAILY
COMMUNITY

## 2022-07-14 RX ORDER — AMIODARONE HYDROCHLORIDE 200 MG/1
200 TABLET ORAL ONCE
Status: COMPLETED | OUTPATIENT
Start: 2022-07-15 | End: 2022-07-15

## 2022-07-14 RX ADMIN — POTASSIUM PHOSPHATE, MONOBASIC AND POTASSIUM PHOSPHATE, DIBASIC 15 MMOL: 224; 236 INJECTION, SOLUTION, CONCENTRATE INTRAVENOUS at 06:06

## 2022-07-14 RX ADMIN — METOPROLOL TARTRATE 50 MG: 50 TABLET, FILM COATED ORAL at 20:03

## 2022-07-14 RX ADMIN — MORPHINE SULFATE 2 MG: 2 INJECTION, SOLUTION INTRAMUSCULAR; INTRAVENOUS at 17:31

## 2022-07-14 RX ADMIN — OXYCODONE HYDROCHLORIDE AND ACETAMINOPHEN 2 TABLET: 5; 325 TABLET ORAL at 17:31

## 2022-07-14 RX ADMIN — BUSPIRONE HYDROCHLORIDE 10 MG: 10 TABLET ORAL at 08:30

## 2022-07-14 RX ADMIN — ASPIRIN 325 MG: 325 TABLET, COATED ORAL at 08:30

## 2022-07-14 RX ADMIN — TIZANIDINE 4 MG: 4 TABLET ORAL at 21:42

## 2022-07-14 RX ADMIN — MORPHINE SULFATE 2 MG: 2 INJECTION, SOLUTION INTRAMUSCULAR; INTRAVENOUS at 20:03

## 2022-07-14 RX ADMIN — GABAPENTIN 100 MG: 100 CAPSULE ORAL at 15:31

## 2022-07-14 RX ADMIN — METOPROLOL TARTRATE 25 MG: 25 TABLET, FILM COATED ORAL at 18:14

## 2022-07-14 RX ADMIN — GABAPENTIN 100 MG: 100 CAPSULE ORAL at 08:30

## 2022-07-14 RX ADMIN — POTASSIUM PHOSPHATE, MONOBASIC AND POTASSIUM PHOSPHATE, DIBASIC 15 MMOL: 224; 236 INJECTION, SOLUTION, CONCENTRATE INTRAVENOUS at 20:03

## 2022-07-14 RX ADMIN — SENNOSIDES AND DOCUSATE SODIUM 2 TABLET: 50; 8.6 TABLET ORAL at 20:03

## 2022-07-14 RX ADMIN — SENNOSIDES AND DOCUSATE SODIUM 2 TABLET: 50; 8.6 TABLET ORAL at 08:30

## 2022-07-14 RX ADMIN — OXYCODONE HYDROCHLORIDE AND ACETAMINOPHEN 2 TABLET: 5; 325 TABLET ORAL at 12:27

## 2022-07-14 RX ADMIN — AMIODARONE HYDROCHLORIDE 0.5 MG/MIN: 1.8 INJECTION, SOLUTION INTRAVENOUS at 21:43

## 2022-07-14 RX ADMIN — AMIODARONE HYDROCHLORIDE 150 MG: 1.5 INJECTION, SOLUTION INTRAVENOUS at 03:27

## 2022-07-14 RX ADMIN — HYDROCODONE BITARTRATE AND ACETAMINOPHEN 1 TABLET: 7.5; 325 TABLET ORAL at 15:31

## 2022-07-14 RX ADMIN — SODIUM CHLORIDE 1.5 G: 900 INJECTION INTRAVENOUS at 05:45

## 2022-07-14 RX ADMIN — ATORVASTATIN CALCIUM 40 MG: 40 TABLET, FILM COATED ORAL at 20:03

## 2022-07-14 RX ADMIN — QUETIAPINE FUMARATE 50 MG: 25 TABLET ORAL at 20:03

## 2022-07-14 RX ADMIN — BUSPIRONE HYDROCHLORIDE 10 MG: 10 TABLET ORAL at 15:31

## 2022-07-14 RX ADMIN — GABAPENTIN 100 MG: 100 CAPSULE ORAL at 20:03

## 2022-07-14 RX ADMIN — OXYCODONE HYDROCHLORIDE AND ACETAMINOPHEN 2 TABLET: 5; 325 TABLET ORAL at 05:45

## 2022-07-14 RX ADMIN — DULOXETINE HYDROCHLORIDE 60 MG: 60 CAPSULE, DELAYED RELEASE ORAL at 08:30

## 2022-07-14 RX ADMIN — MORPHINE SULFATE 2 MG: 2 INJECTION, SOLUTION INTRAMUSCULAR; INTRAVENOUS at 18:14

## 2022-07-14 RX ADMIN — METOPROLOL TARTRATE 12.5 MG: 25 TABLET, FILM COATED ORAL at 08:30

## 2022-07-14 RX ADMIN — BUSPIRONE HYDROCHLORIDE 10 MG: 10 TABLET ORAL at 20:03

## 2022-07-14 RX ADMIN — AMIODARONE HYDROCHLORIDE 1 MG/MIN: 1.8 INJECTION, SOLUTION INTRAVENOUS at 03:27

## 2022-07-14 RX ADMIN — HYDROCODONE BITARTRATE AND ACETAMINOPHEN 1 TABLET: 7.5; 325 TABLET ORAL at 08:32

## 2022-07-14 NOTE — PROGRESS NOTES
Multidisciplinary Rounds    Time: 20 min  Patient Name: Josh Horan  Date of Encounter: 07/14/22 09:58 EDT  MRN: [id number]  Admission date: 7/12/2022      Reason for visit: MDR. RD to continue to follow per protocol.       Additional information obtained during MDR: Patient resting in bed at time of visit, no family at bedside. Patient's appearance looks brighter today compared to yesterday. RD notes last zofran given was yesterday afternoon and MD added PPI. He reports his nausea has improved, but not completely resolved. This morning he is tolerating water, coffee, and jello. Tried the Boost Breeze and did not like it. Would rather have the full liquid ONS drinks.      Current diet: Diet Clear Liquid  Oral nutrition supplement: Boost Breeze 2x daily      Intervention:  Follow treatment plan  Care plan reviewed  Discontinued Boost Breeze. Ordered Boost Plus and Premier Protein daily.      Follow up:   Per protocol      Marla Sanders RD  09:58 EDT

## 2022-07-14 NOTE — PROGRESS NOTES
Pt. Referred for Phase II Cardiac Rehab. Staff discussed benefits of exercise, program protocol, and educational material provided. Teach back verified.  Permission granted from patient for staff to fax referral information to outlying program at this time.  Staff faxed referral info to [facility].

## 2022-07-14 NOTE — PLAN OF CARE
Goal Outcome Evaluation:    Neuro intact. Alert and oriented x4. Sats > 92% on 4 L NC. Converted to afib rvr approx 0300, started on amio protocol. Ambulated 140 feet with one assist. Rested well this evening. Progressing per POC.    Gtts: amio    UOP = 820 mL    Chest tubes  1/2 = 70  3/4 = 120

## 2022-07-14 NOTE — PROGRESS NOTES
" [facility] - Progress Note    Josh Horan  1960  S260/1    07/14/22, 09:26 EDT      Chief Complaint: Following for post op management of BP, arrhythmias    Subjective:   Extubated successfully.  Neurologically intact. Walking with PT this morning.  AFib with RVR      Review of Systems:  Pertinent positives are listed above and in physical exam.  All others have been reviewed and are negative.    acetaminophen, 650 mg, Oral, Q8H  [START ON 7/15/2022] amiodarone, 200 mg, Oral, Once   Followed by  [START ON 7/15/2022] amiodarone, 200 mg, Oral, Q8H   Followed by  [START ON 7/22/2022] amiodarone, 200 mg, Oral, Q12H   Followed by  [START ON 8/5/2022] amiodarone, 200 mg, Oral, Daily  aspirin, 325 mg, Oral, Daily  atorvastatin, 40 mg, Oral, Nightly  busPIRone, 10 mg, Oral, TID  chlorhexidine, 15 mL, Mouth/Throat, Q12H  DULoxetine, 60 mg, Oral, Daily  gabapentin, 100 mg, Oral, TID  insulin lispro, 0-7 Units, Subcutaneous, TID AC  metoprolol tartrate, 12.5 mg, Oral, Q12H  pantoprazole, 40 mg, Intravenous, Q AM  pharmacy consult - [place], , Does not apply, Daily  QUEtiapine, 50 mg, Oral, Nightly  senna-docusate sodium, 2 tablet, Oral, BID        Objective:  Vitals:   height is 170.2 cm (67\") and weight is 98 kg (216 lb). His oral temperature is 98.5 °F (36.9 °C). His blood pressure is 115/77 and his pulse is 117. His respiration is 28 and oxygen saturation is 92%.       Intake/Output Summary (Last 24 hours) at 7/14/2022 0926  Last data filed at 7/14/2022 0643  Gross per 24 hour   Intake --   Output 1995 ml   Net -1995 ml       Physical Exam:  General:  WN, NAD, A and O x3.  CV:  Irregular, tachy. No murmur, rub, or gallop.  Resp:  CTA Tay, equal, nonlabored.  Abd:  Soft, + BS, no organomegaly. Nontender to palpation.  Extrem:  No edema BLE, 2+ pedal/PT pulses.            Results from last 7 days   Lab Units 07/14/22  0259   WBC 10*3/mm3 12.37*   HEMOGLOBIN g/dL 9.7*   HEMATOCRIT % 28.9*   PLATELETS " 10*3/mm3 141     Results from last 7 days   Lab Units 07/14/22  0259   SODIUM mmol/L 138   POTASSIUM mmol/L 3.9   CHLORIDE mmol/L 103   CO2 mmol/L 28.0   BUN mg/dL 10   CREATININE mg/dL 0.68*   CALCIUM mg/dL 8.4*   BILIRUBIN mg/dL 1.1   ALK PHOS U/L 53   ALT (SGPT) U/L 11   AST (SGOT) U/L 36   GLUCOSE mg/dL 114*     Results from last 7 days   Lab Units 07/13/22  0208 07/12/22  1558 07/11/22  1209   INR  1.20* 1.44* 0.97                   Tele:  Atrial  Fibrillation    Assessment and Plan:  1.  Essential  Hypertension              -  Controlled currently              -  Lopressor 12.5mg BID ordered.  Adjust accordingly     2.  Hyperlipidemia              -  Continue high intensity statin, appropriate diet     3.  Coronary Artery Disease              -  Presents with 3 months progressive dyspnea and chest pain.  MV CAD by [facility].              -  EF 50-55% by echo 4/2022              -  POD 2 p CABG, Dr. Anton:  LIMA-LAD, SVG-RCA, SVG- DX of LAD, SVG- OM1-OM2.  REGINA ligation with 35 atrial clip.              -  Continue support.     4.  Hypothyroidism              -  Currently on no supplementation.              -  Check labs.    5.  Post Op Atrial Fibrillation with RVR   -  Amiodarone protocol initiated.   -  Continue to monitor.        60 yo CM with HTN, HLP, TAMIKO, hypothyroid, marijuana use, PTSD, Bipolar with progressive angina and dyspnea found to have MV CAD by [facility].  Now POD 1 after CABG, normal EF.  Extubated and out of bed, still requiring low dose pressor support.  Watch rhythm, ambulate, continue to monitor.        I discussed the patient's findings and my recommendations with the patient, any present family members, and the nursing staff.  Deandre Horowitz MD saw and examined patient, verified hx and PE, read all radiographic studies, reviewed labs and micro data, and formulated dx, plan for treatment and all medical decision making.      Jeannine Robison PA-C  07/14/22, 09:26 EDT

## 2022-07-14 NOTE — THERAPY TREATMENT NOTE
Patient Name: Josh Horan  : 1960    MRN: [id number]                              Today's Date: 2022       Admit Date: 2022    Visit Dx:     ICD-10-CM ICD-9-CM   1. Coronary artery disease of native artery of native heart with stable angina pectoris (HCC)  I25.118 414.01     413.9     Patient Active Problem List   Diagnosis   • Coronary artery disease of native artery of native heart with stable angina pectoris (HCC)   • Abnormal findings on diagnostic imaging of heart and coronary circulation   • Hypertension   • Hyperlipidemia   • Anxiety   • Acid reflux   • TAMIKO (obstructive sleep apnea)   • Depression   • Bipolar 1 disorder (HCC)   • PTSD (post-traumatic stress disorder)   • Marijuana use   • Hypothyroidism     Past Medical History:   Diagnosis Date   • Acid reflux    • Anxiety    • Arthritis    • Back pain    • Bipolar 1 disorder (HCC)    • Coronary artery disease    • Depression    • Headache    • Hiatal hernia    • Hyperlipidemia    • Hypertension    • Hyperthyroidism    • Hypothyroidism    • Lyme disease    • Migraine    • MRSA infection     1.5-2 years ago- back   • Panic attacks    • PTSD (post-traumatic stress disorder)    • Sciatic nerve pain    • Seasonal allergies    • Sleep apnea     doesnt use machine   • Tinnitus    • Wears glasses      Past Surgical History:   Procedure Laterality Date   • CARDIAC CATHETERIZATION      2022 Dr. Salguero   • CORONARY ARTERY BYPASS GRAFT N/A 2022    Procedure: MEDIAN STERNOTOMY CORONARY ARTERY BYPASS GRAFTING X5 , UTILIZING THE LEFT INTERNAL MAMMERY GRAFT, ENDOSCOPIC VEIN HARVEST OF THE GREATER RIGHT SAPHENOUS VEIN WITH EXPLORATION OF THE LEFT LEG;  Surgeon: Baldomero Anton MD;  Location: [facility];  Service: Cardiothoracic;  Laterality: N/A;   • HERNIA REPAIR Left     umbilical hernia repair - pt unsure about mesh   • OTHER SURGICAL HISTORY      Lymph node removal neck   • VASECTOMY        General Information     Row Name 22 1148           Physical Therapy Time and Intention    Document Type therapy note (daily note)  -SILVIO     Mode of Treatment physical therapy  -SILVIO     Row Name 07/14/22 1148          General Information    Patient Profile Reviewed yes  -SILVIO     Prior Level of Function independent:;bed mobility;ADL's;gait;transfer  -SILVIO     Existing Precautions/Restrictions cardiac;fall;oxygen therapy device and L/min;sternal  -SILVIO     Barriers to Rehab medically complex  -SILVIO     Row Name 07/14/22 1148          Living Environment    People in Home child(gustavo), adult  -SILVIO     Row Name 07/14/22 1148          Home Main Entrance    Number of Stairs, Main Entrance none  -SILVIO     Row Name 07/14/22 1148          Cognition    Orientation Status (Cognition) oriented x 3  -SILVIO     Row Name 07/14/22 1148          Safety Issues, Functional Mobility    Safety Issues Affecting Function (Mobility) safety precautions follow-through/compliance;safety precaution awareness  -SILVIO     Impairments Affecting Function (Mobility) balance;endurance/activity tolerance;shortness of breath;strength  -SILVIO           User Key  (r) = Recorded By, (t) = Taken By, (c) = Cosigned By    Initials Name Provider Type    SILVIO Giselle Darnell, PT Physical Therapist               Mobility     Row Name 07/14/22 1149          Bed Mobility    Bed Mobility rolling left;rolling right;sit-supine;scooting/bridging  -SILVIO     Rolling Left [place] (Bed Mobility) minimum assist (75% patient effort)  -SILVIO     Rolling Right [place] (Bed Mobility) minimum assist (75% patient effort)  -SILVIO     Scooting/Bridging [place] (Bed Mobility) minimum assist (75% patient effort)  -SILVIO     Sit-Supine [place] (Bed Mobility) minimum assist (75% patient effort);2 person assist  -SILVIO     Assistive Device (Bed Mobility) head of bed elevated  -SILVIO     Comment, (Bed Mobility) patient needs cues for sternal precautions and a reminder to breath throughout activity patient has less pain with bed mobility compared  to yesterday  -SILVIO     Row Name 07/14/22 1149          Bed-Chair Transfer    Bed-Chair [place] (Transfers) contact guard  -SILVIO     Row Name 07/14/22 1149          Sit-Stand Transfer    Sit-Stand [place] (Transfers) contact guard  -SILVIO     Row Name 07/14/22 1149          Gait/Stairs (Locomotion)    [place] Level (Gait) contact guard  -SILVIO     Distance in Feet (Gait) 200  -SILVIO     Deviations/Abnormal Patterns (Gait) stride length decreased  -SILVIO     Comment, (Gait/Stairs) step through gait pattern improved [facility] today no LOB  -SILVIO           User Key  (r) = Recorded By, (t) = Taken By, (c) = Cosigned By    Initials Name Provider Type    Giselle Whalen, PT Physical Therapist               Obj/Interventions     Row Name 07/14/22 1151          Balance    Balance Assessment sitting static balance;sitting dynamic balance;standing static balance;standing dynamic balance  -SILVIO     Static Sitting Balance independent  -SILVIO     Dynamic Sitting Balance independent  -SILVIO     Position, Sitting Balance unsupported;sitting edge of bed  -SILVIO     Static Standing Balance contact guard  -SILVIO     Dynamic Standing Balance contact guard  -SILVIO     Position/Device Used, Standing Balance supported  -SILVIO     Balance Interventions standing;dynamic;weight shifting activity  -SILVIO           User Key  (r) = Recorded By, (t) = Taken By, (c) = Cosigned By    Initials Name Provider Type    Giselle Whalen PT Physical Therapist               Goals/Plan     Row Name 07/14/22 1155          Therapy Assessment/Plan (PT)    Planned Therapy Interventions (PT) balance training;bed mobility training;gait training;home exercise program;strengthening;transfer training  -SILVIO           User Key  (r) = Recorded By, (t) = Taken By, (c) = Cosigned By    Initials Name Provider Type    Giselle Whalen, PT Physical Therapist               Clinical Impression     Row Name 07/14/22 1152          Pain    Pretreatment Pain Rating 4/10  -SILVIO      Posttreatment Pain Rating 5/10  -SILVIO     Pain Location incisional  -SILVIO     Pain Location - chest  -SILVIO     Pain Intervention(s) Repositioned;Ambulation/increased activity;Splinting  -SILVIO     Row Name 07/14/22 1152          Plan of Care Review    Plan of Care Reviewed With patient  -SILVIO     Progress improving  -SILVIO     Outcome Evaluation patient was able to increase ambulation to 200 ft with CGA he has stable vitals with activity, patient is progressing toward mobility goals as expected we will continue to increase activity as tolerated.  -SILVIO     Row Name 07/14/22 1152          Therapy Assessment/Plan (PT)    Patient/Family Therapy Goals Statement (PT) return to PLOF  -SILVIO     Rehab Potential (PT) good, to achieve stated therapy goals  -SILVIO     Criteria for Skilled Interventions Met (PT) yes;skilled treatment is necessary  -SILVIO     Therapy Frequency (PT) daily  -SILVIO     Row Name 07/14/22 1152          Vital Signs    Pre Systolic BP Rehab 125  -SILVIO     Pre Treatment Diastolic BP 79  -SILVIO     Post Systolic BP Rehab 120  -SILVIO     Post Treatment Diastolic BP 90  -SILVIO     Pretreatment Heart Rate (beats/min) 118  -SILVIO     Intratreatment Heart Rate (beats/min) 140  -SILVIO     Posttreatment Heart Rate (beats/min) 120  patient is in a-Fib today RN is aware and assists with tx.  -SILVIO     Pre SpO2 (%) 94  -SILVIO     O2 Delivery Pre Treatment nasal cannula  -SILVIO     Post SpO2 (%) 93  -SILVIO     O2 Delivery Post Treatment nasal cannula  -SILVIO     Pre Patient Position Sitting  -SILVIO     Intra Patient Position Standing  -SILVIO     Post Patient Position Supine  -SILVIO     Row Name 07/14/22 1152          Positioning and Restraints    Pre-Treatment Position sitting in chair/recliner  -SILVIO     Post Treatment Position bed  -SILVIO     In Bed notified nsg;supine;encouraged to call for assist;call light within reach;with nsg  -SILVIO           User Key  (r) = Recorded By, (t) = Taken By, (c) = Cosigned By    Initials Name Provider Type    Giselle Whalen, PT Physical  Therapist               Outcome Measures     Row Name 07/14/22 1156 07/14/22 0800       How much help from another person do you currently need...    Turning from your back to your side while in flat bed without using bedrails? 3  -SILVIO 2  -JW    Moving from lying on back to sitting on the side of a flat bed without bedrails? 3  -SILVIO 2  -JW    Moving to and from a bed to a chair (including a wheelchair)? 3  -SILVIO 3  -JW    Standing up from a chair using your arms (e.g., wheelchair, bedside chair)? 3  -SILVIO 3  -JW    Climbing 3-5 steps with a railing? 2  -SILVIO 2  -JW    To walk in hospital room? 3  -SILVIO 3  -JW    AM-PAC 6 Clicks Score (PT) 17  -SILVIO 15  -JW    Highest level of mobility 5 --> Static standing  -SILVIO 4 --> Transferred to chair/commode  -JW    Row Name 07/14/22 1156          Functional Assessment    Outcome Measure Options AM-PAC 6 Clicks Basic Mobility (PT)  -SILVIO           User Key  (r) = Recorded By, (t) = Taken By, (c) = Cosigned By    Initials Name Provider Type    Giselle Whalen, PT Physical Therapist    Baylee Nelson, RN Registered Nurse                             Physical Therapy Education                 Title: PT OT SLP Therapies (In Progress)     Topic: Physical Therapy (In Progress)     Point: Mobility training (In Progress)     Learning Progress Summary           Patient Acceptance, E, NR by SILVIO at 7/14/2022 0915    Acceptance, E, NR by KG at 7/13/2022 1007                   Point: Home exercise program (In Progress)     Learning Progress Summary           Patient Acceptance, E, NR by SILVIO at 7/14/2022 0915    Acceptance, E, NR by KG at 7/13/2022 1007                   Point: Body mechanics (In Progress)     Learning Progress Summary           Patient Acceptance, E, NR by SILVIO at 7/14/2022 0915    Acceptance, E, NR by KG at 7/13/2022 1007                   Point: Precautions (In Progress)     Learning Progress Summary           Patient Acceptance, E, NR by SILVIO at 7/14/2022 0915    Acceptance, E, NR  by KG at 7/13/2022 1007                               User Key     Initials Effective Dates Name Provider Type Discipline    SILVIO 06/16/21 -  Giselle Darnell PT Physical Therapist PT    KG 05/22/20 -  Gabriella Pelaez PT Physical Therapist PT              PT Recommendation and Plan  Planned Therapy Interventions (PT): balance training, bed mobility training, gait training, home exercise program, strengthening, transfer training  Plan of Care Reviewed With: patient  Progress: improving  Outcome Evaluation: patient was able to increase ambulation to 200 ft with CGA he has stable vitals with activity, patient is progressing toward mobility goals as expected we will continue to increase activity as tolerated.     Time Calculation:    PT Charges     Row Name 07/14/22 1157             Time Calculation    Start Time 0915  -SILVIO      PT Received On 07/14/22  -SILVIO      PT Goal Re-Cert Due Date 07/23/22  -SILVIO              Time Calculation- PT    Total Timed Code Minutes- PT 23 minute(s)  -SILVIO              Timed Charges    14483 - PT Therapeutic Activity Minutes 23  -SILVIO              Total Minutes    Timed Charges Total Minutes 23  -SILVIO       Total Minutes 23  -SILVIO            User Key  (r) = Recorded By, (t) = Taken By, (c) = Cosigned By    Initials Name Provider Type    SILVIO Giselle Darnell, PT Physical Therapist              Therapy Charges for Today     Code Description Service Date Service Provider Modifiers Qty    91587676214 HC PT THERAPEUTIC ACT EA 15 MIN 7/14/2022 Giselle Darnell, PT GP 2          PT G-Codes  Outcome Measure Options: AM-PAC 6 Clicks Basic Mobility (PT)  AM-PAC 6 Clicks Score (PT): 17    Giselle Darnell PT  7/14/2022

## 2022-07-14 NOTE — PLAN OF CARE
Goal Outcome Evaluation:  Plan of Care Reviewed With: patient        Progress: improving  Outcome Evaluation: patient was able to increase ambulation to 200 ft with CGA he has stable vitals with activity, patient is progressing toward mobility goals as expected we will continue to increase activity as tolerated.

## 2022-07-14 NOTE — PROGRESS NOTES
INTENSIVIST / PULMONARY FOLLOW UP NOTE     Hospital:  LOS: 2 days   Mr. Josh Horan, 61 y.o. male is followed for:     Coronary artery disease of native artery of native heart with stable angina pectoris (HCC)    Hypertension    Hyperlipidemia    Anxiety    Acid reflux    TAMIKO (obstructive sleep apnea)    Depression    Bipolar 1 disorder (HCC)    PTSD (post-traumatic stress disorder)    Marijuana use    Hypothyroidism          SUBJECTIVE     Developed afib    The patient's relevant past medical, surgical, family, and social history were reviewed    Allergies and medications were reviewed    ROS:  Per HPI otherwise negative          OBJECTIVE     Vital Sign Min/Max for last 24 hours:  Temp  Min: 97.7 °F (36.5 °C)  Max: 99 °F (37.2 °C)   BP  Min: 93/69  Max: 144/82   Pulse  Min: 70  Max: 117   Resp  Min: 14  Max: 28   SpO2  Min: 92 %  Max: 97 %   No data recorded     Physical Exam:  General Appearance:  No acute distress  Eyes:  No scleral icterus or pallor, pupils normal  Ears, Nose, Mouth, Throat:  Atraumatic, oropharynx clear  Neck:  Trachea midline, thyroid normal  Respiratory:  Clear to auscultation bilaterally, normal effort  Cardiovascular:  Regular rate and rhythm, no murmurs, no peripheral edema  Gastrointestinal:  Soft, non-tender, non-distended, no hepatosplenomegaly  Skin:  Normal temperature, no rash  Psychiatric:  No agitation  Neuro:  No new focal neurologic deficits observed    Telemetry:              Hemodynamics:   CVP:     PAP:     PAOP:     CO: CO (L/min): 6.2 L/min   CI: CI (L/min/m2): 2.97 L/min/m2   SVI: SVI: 45   SVR:       SpO2: 96 % SpO2  Min: 92 %  Max: 97 %   Device:      Flow Rate:   No data recorded         Intake/Ouptut 24 hrs (7:00AM - 6:59 AM)  Intake & Output (last 3 days)       07/11 0701  07/12 0700 07/12 0701  07/13 0700 07/13 0701  07/14 0700 07/14 0701  07/15 0700    P.O.    240    I.V. (mL/kg)  1265.1 (12.9)      Blood  1200      NG/GT  60      IV Piggyback  1500      Total  Intake(mL/kg)  4025.1 (41.1)  240 (2.4)    Urine (mL/kg/hr)  3380 1595 (0.7) 450 (0.6)    Chest Tube  670 530 100    Total Output  4050 2125 550    Net  -24.9 -2125 -310                  Lines, Drains & Airways     Active LDAs     Name Placement date Placement time Site Days    Peripheral IV 04/27/22 1222 Right Antecubital 04/27/22  1222  Antecubital  75                Hematology:  Results from last 7 days   Lab Units 07/14/22  0259 07/13/22  0208 07/12/22  1942 07/12/22  1558 07/12/22  1439 07/12/22  1347 07/12/22  1313 07/12/22  1118 07/11/22  1209   WBC 10*3/mm3 12.37* 10.92* 8.69 8.26  --   --   --   --  10.24   HEMOGLOBIN g/dL 9.7* 10.4* 9.5* 10.1*  --   --   --   --  14.6   HEMOGLOBIN, POC g/dL  --   --   --   --  9.2* 11.2* 10.5*   < >  --    HEMATOCRIT % 28.9* 30.1* 26.5* 28.5*  --   --   --   --  42.3   HEMATOCRIT POC %  --   --   --   --  27* 33* 31*   < >  --    PLATELETS 10*3/mm3 141 176 134* 120*  --   --   --   --  212    < > = values in this interval not displayed.     Electrolytes, Magnesium and Phosphorus:  Results from last 7 days   Lab Units 07/14/22  0259 07/13/22  0208 07/12/22  1942 07/12/22  1558 07/11/22  1209   SODIUM mmol/L 138 140 141 140 140   CHLORIDE mmol/L 103 107 106 107 105   POTASSIUM mmol/L 3.9 4.0 3.7 3.6  3.6 4.1   CO2 mmol/L 28.0 25.0 25.0 22.0 26.0   MAGNESIUM mg/dL 2.5 2.4 2.4 1.6 2.0   PHOSPHORUS mg/dL 2.4* 2.4* 2.8 2.5  --      Renal:  Results from last 7 days   Lab Units 07/14/22  0259 07/13/22  0208 07/12/22  1942 07/12/22  1558 07/11/22  1209   CREATININE mg/dL 0.68* 0.78 0.80 0.73* 0.84   BUN mg/dL 10 11 11 10 14     Estimated Creatinine Clearance: 130.5 mL/min (A) (by C-G formula based on SCr of 0.68 mg/dL (L)).  Hepatic:  Results from last 7 days   Lab Units 07/14/22  0259 07/11/22  1209   ALK PHOS U/L 53 91   BILIRUBIN mg/dL 1.1 0.3   ALT (SGPT) U/L 11 12   AST (SGOT) U/L 36 13     Arterial Blood Gases:  Results from last 7 days   Lab Units 07/13/22  0106  07/12/22  2149 07/12/22  1635 07/12/22  1439 07/12/22  1347 07/12/22  1313 07/12/22  1247   PH, ARTERIAL pH units 7.392 7.283* 7.394 7.36 7.38 7.37 7.30*   PCO2, ARTERIAL mm Hg 40.1 52.4* 41.2  --   --   --   --    PO2 ART mm Hg 111.0* 75.8* 226.0*  --   --   --   --    FIO2 % 40 40 100  --   --   --   --        Results from last 7 days   Lab Units 07/11/22  1209   HEMOGLOBIN A1C % 5.40       No results found for: LACTATE    Relevant imaging studies and labs from 07/14/22 were reviewed    Medications (drips):            Assessment & Plan   IMPRESSION / PLAN     Inpatient Problem List:  61 y.o.male:  Active Hospital Problems    Diagnosis    • **Coronary artery disease of native artery of native heart with stable angina pectoris (HCC)      S/p CABG by Dr. Anton 7/12/22     • Hypertension    • Hyperlipidemia    • Anxiety    • Acid reflux    • TAMIKO (obstructive sleep apnea)      doesnt use machine     • Depression    • Bipolar 1 disorder (HCC)    • PTSD (post-traumatic stress disorder)    • Marijuana use    • Hypothyroidism         Hospital Course:  61 y.o.male with relevant PMH of HTN, HLP, GERD, marijuana use, hypothyroidism, TAMIKO not CPAP compliant, anxiety, PTSD, depression, bipolar DO, recently diagnosed [facility] admitted 7/12/2022 post op 5vCABG by Dr. Anton.    Impression & Plan:    Pain  PTSD  Bipolar d/o  Titrate narcotics and stool softeners .  Resumed home psych meds    Hypoxemia  IS, mobilize.    Hypothyroidism  Resumed levothyroxine     Stress Hyperglycemia A1c 5.4  D/c FS    Nausea  Prn zofran, PPI    CAD  HTN  HLD  Afib  Per Cardiology    Post op Care  Per CTS    DVT / PUD Prophylaxis  Foot pumps, SQ Heparin when tubes/wires out, Protonix    Nutrition  Dietary Orders (From admission, onward)     Start     Ordered    07/14/22 1400  Diet Regular; Cardiac  Diet Effective 1400        Question Answer Comment   Diet Texture / Consistency Regular    Common Modifiers Cardiac        07/14/22 1353    07/14/22 1003   Dietary Nutrition Supplements Premier Protein  Daily With Lunch      Comments: OK to send on clear liquid diet too - Premier Protein daily with Lunch. Per RD.   Question:  Select Supplement:  Answer:  Premier Protein    07/14/22 1002    07/14/22 1002  Dietary Nutrition Supplements Boost Plus; strawberry  Daily With Breakfast & Dinner      Comments: OK to send on clear liquid diet too - Strawberry Boost Plus 2x daily (Breakfast/Dinner). Per RD.   Question Answer Comment   Select Supplement: Boost Plus    Flavor: strawberry        07/14/22 1001              Plan of care and goals reviewed with multidisciplinary team at daily rounds             Norman Meléndez MD  Intensive Care Medicine  07/14/22 14:32 EDT

## 2022-07-14 NOTE — PROGRESS NOTES
CTS Progress Note       LOS: 2 days   Patient Care Team:  Jerrica Madrid APRN as PCP - General (Family Medicine)    Chief Complaint: Coronary artery disease of native artery of native heart with stable angina pectoris (HCC)    Vital Signs:  Temp:  [97.9 °F (36.6 °C)-99.3 °F (37.4 °C)] 98.5 °F (36.9 °C)  Heart Rate:  [] 117  Resp:  [14-28] 28  BP: ()/(60-86) 115/77  Arterial Line BP: (104-129)/(50-61) 123/61    Physical Exam: Up in chair alert conversant atrial fibrillation       Results:   Results from last 7 days   Lab Units 07/14/22  0259   WBC 10*3/mm3 12.37*   HEMOGLOBIN g/dL 9.7*   HEMATOCRIT % 28.9*   PLATELETS 10*3/mm3 141     Results from last 7 days   Lab Units 07/14/22  0259   SODIUM mmol/L 138   POTASSIUM mmol/L 3.9   CHLORIDE mmol/L 103   CO2 mmol/L 28.0   BUN mg/dL 10   CREATININE mg/dL 0.68*   GLUCOSE mg/dL 114*   CALCIUM mg/dL 8.4*           Imaging Results (Last 24 Hours)     Procedure Component Value Units Date/Time    XR Chest 1 View [[id number]] Resulted: 07/14/22 0622     Updated: 07/14/22 0623    XR Chest 1 View [[id number]] Collected: 07/13/22 0858     Updated: 07/13/22 2304    Narrative:      DATE OF EXAM: 07/13/2022 5:12 AM     PROCEDURE: XR CHEST 1 VIEW-     INDICATIONS: Post-Op Heart Surgery; I25.118-Atherosclerotic heart  disease of native coronary artery with other forms of angina pectoris     COMPARISON: 07/12/2022     TECHNIQUE: Single radiographic AP view of the chest was obtained.     FINDINGS:  ET tube and NG tube have been removed. Right IJ catheter tip remains in  place in the mid SVC. Heart is enlarged. Vasculature is upper limits of  normal. There is mild new left basilar discoid atelectasis, and mild  remaining right midlung discoid atelectasis. No definite pneumothorax is  identified on today's exam and no effusion is seen.             Impression:      Post extubation chest radiograph with mild new left basilar discoid  atelectasis, but no other significant new chest  disease.     This report was finalized on 7/13/2022 11:01 PM by Dr. Manjinder Aldridge MD.             Assessment      Coronary artery disease of native artery of native heart with stable angina pectoris (HCC)    Hypertension    Hyperlipidemia    Anxiety    Acid reflux    TAMIKO (obstructive sleep apnea)    Depression    Bipolar 1 disorder (HCC)    PTSD (post-traumatic stress disorder)    Marijuana use    Hypothyroidism        Plan   Do not remove chest tubes today    Please note that portions of this note were completed with a voice recognition program. Efforts were made to edit the dictations, but occasionally words are mistranscribed.    Baldomero Anton MD  07/14/22  07:32 EDT

## 2022-07-15 ENCOUNTER — APPOINTMENT (OUTPATIENT)
Dept: GENERAL RADIOLOGY | Facility: HOSPITAL | Age: 62
End: 2022-07-15

## 2022-07-15 LAB
ALBUMIN SERPL-MCNC: 3.5 G/DL (ref 3.5–5.2)
ALBUMIN/GLOB SERPL: 1.3 G/DL
ALP SERPL-CCNC: 61 U/L (ref 39–117)
ALT SERPL W P-5'-P-CCNC: 8 U/L (ref 1–41)
ANION GAP SERPL CALCULATED.3IONS-SCNC: 9 MMOL/L (ref 5–15)
AST SERPL-CCNC: 22 U/L (ref 1–40)
BASOPHILS # BLD AUTO: 0.08 10*3/MM3 (ref 0–0.2)
BASOPHILS NFR BLD AUTO: 0.6 % (ref 0–1.5)
BH BB BLOOD EXPIRATION DATE: NORMAL
BH BB BLOOD EXPIRATION DATE: NORMAL
BH BB BLOOD TYPE BARCODE: 7300
BH BB BLOOD TYPE BARCODE: 7300
BH BB DISPENSE STATUS: NORMAL
BH BB DISPENSE STATUS: NORMAL
BH BB PRODUCT CODE: NORMAL
BH BB PRODUCT CODE: NORMAL
BH BB UNIT NUMBER: NORMAL
BH BB UNIT NUMBER: NORMAL
BILIRUB SERPL-MCNC: 0.7 MG/DL (ref 0–1.2)
BUN SERPL-MCNC: 10 MG/DL (ref 6–20)
BUN/CREAT SERPL: 13 (ref 7–25)
CALCIUM SPEC-SCNC: 9 MG/DL (ref 8.6–10.5)
CHLORIDE SERPL-SCNC: 101 MMOL/L (ref 98–107)
CO2 SERPL-SCNC: 26 MMOL/L (ref 22–29)
CREAT SERPL-MCNC: 0.77 MG/DL (ref 0.76–1.27)
CROSSMATCH INTERPRETATION: NORMAL
CROSSMATCH INTERPRETATION: NORMAL
DEPRECATED RDW RBC AUTO: 42.2 FL (ref 37–54)
EGFRCR SERPLBLD CKD-EPI 2021: 103.1 ML/MIN/1.73
EOSINOPHIL # BLD AUTO: 0.33 10*3/MM3 (ref 0–0.4)
EOSINOPHIL NFR BLD AUTO: 2.5 % (ref 0.3–6.2)
ERYTHROCYTE [DISTWIDTH] IN BLOOD BY AUTOMATED COUNT: 13 % (ref 12.3–15.4)
GLOBULIN UR ELPH-MCNC: 2.7 GM/DL
GLUCOSE SERPL-MCNC: 139 MG/DL (ref 65–99)
HCT VFR BLD AUTO: 29.4 % (ref 37.5–51)
HGB BLD-MCNC: 10.2 G/DL (ref 13–17.7)
IMM GRANULOCYTES # BLD AUTO: 0.06 10*3/MM3 (ref 0–0.05)
IMM GRANULOCYTES NFR BLD AUTO: 0.5 % (ref 0–0.5)
LYMPHOCYTES # BLD AUTO: 2.69 10*3/MM3 (ref 0.7–3.1)
LYMPHOCYTES NFR BLD AUTO: 20.5 % (ref 19.6–45.3)
MAGNESIUM SERPL-MCNC: 2.1 MG/DL (ref 1.6–2.6)
MCH RBC QN AUTO: 30.6 PG (ref 26.6–33)
MCHC RBC AUTO-ENTMCNC: 34.7 G/DL (ref 31.5–35.7)
MCV RBC AUTO: 88.3 FL (ref 79–97)
MONOCYTES # BLD AUTO: 1.22 10*3/MM3 (ref 0.1–0.9)
MONOCYTES NFR BLD AUTO: 9.3 % (ref 5–12)
NEUTROPHILS NFR BLD AUTO: 66.6 % (ref 42.7–76)
NEUTROPHILS NFR BLD AUTO: 8.75 10*3/MM3 (ref 1.7–7)
NRBC BLD AUTO-RTO: 0 /100 WBC (ref 0–0.2)
PHOSPHATE SERPL-MCNC: 2.7 MG/DL (ref 2.5–4.5)
PLATELET # BLD AUTO: 151 10*3/MM3 (ref 140–450)
PMV BLD AUTO: 10.8 FL (ref 6–12)
POTASSIUM SERPL-SCNC: 4.2 MMOL/L (ref 3.5–5.2)
PROT SERPL-MCNC: 6.2 G/DL (ref 6–8.5)
QT INTERVAL: 308 MS
QT INTERVAL: 340 MS
QTC INTERVAL: 387 MS
QTC INTERVAL: 387 MS
RBC # BLD AUTO: 3.33 10*6/MM3 (ref 4.14–5.8)
SODIUM SERPL-SCNC: 136 MMOL/L (ref 136–145)
UNIT  ABO: NORMAL
UNIT  ABO: NORMAL
UNIT  RH: NORMAL
UNIT  RH: NORMAL
WBC NRBC COR # BLD: 13.13 10*3/MM3 (ref 3.4–10.8)

## 2022-07-15 PROCEDURE — 85025 COMPLETE CBC W/AUTO DIFF WBC: CPT | Performed by: INTERNAL MEDICINE

## 2022-07-15 PROCEDURE — 25010000002 MAGNESIUM SULFATE 2 GM/50ML SOLUTION: Performed by: INTERNAL MEDICINE

## 2022-07-15 PROCEDURE — 93010 ELECTROCARDIOGRAM REPORT: CPT | Performed by: INTERNAL MEDICINE

## 2022-07-15 PROCEDURE — 83735 ASSAY OF MAGNESIUM: CPT | Performed by: INTERNAL MEDICINE

## 2022-07-15 PROCEDURE — 93005 ELECTROCARDIOGRAM TRACING: CPT | Performed by: NURSE PRACTITIONER

## 2022-07-15 PROCEDURE — 25010000002 AMIODARONE IN DEXTROSE 5% 360-4.14 MG/200ML-% SOLUTION

## 2022-07-15 PROCEDURE — 25010000002 AMIODARONE IN DEXTROSE 5% 150-4.21 MG/100ML-% SOLUTION: Performed by: INTERNAL MEDICINE

## 2022-07-15 PROCEDURE — 80053 COMPREHEN METABOLIC PANEL: CPT | Performed by: INTERNAL MEDICINE

## 2022-07-15 PROCEDURE — 25010000002 ALBUMIN HUMAN 5% PER 50 ML: Performed by: THORACIC SURGERY (CARDIOTHORACIC VASCULAR SURGERY)

## 2022-07-15 PROCEDURE — 99024 POSTOP FOLLOW-UP VISIT: CPT | Performed by: THORACIC SURGERY (CARDIOTHORACIC VASCULAR SURGERY)

## 2022-07-15 PROCEDURE — P9041 ALBUMIN (HUMAN),5%, 50ML: HCPCS | Performed by: THORACIC SURGERY (CARDIOTHORACIC VASCULAR SURGERY)

## 2022-07-15 PROCEDURE — 84100 ASSAY OF PHOSPHORUS: CPT | Performed by: INTERNAL MEDICINE

## 2022-07-15 PROCEDURE — 97530 THERAPEUTIC ACTIVITIES: CPT

## 2022-07-15 PROCEDURE — 99233 SBSQ HOSP IP/OBS HIGH 50: CPT | Performed by: INTERNAL MEDICINE

## 2022-07-15 PROCEDURE — 71045 X-RAY EXAM CHEST 1 VIEW: CPT

## 2022-07-15 RX ORDER — AMIODARONE HYDROCHLORIDE 200 MG/1
200 TABLET ORAL EVERY 8 HOURS
Status: DISCONTINUED | OUTPATIENT
Start: 2022-07-16 | End: 2022-07-20 | Stop reason: HOSPADM

## 2022-07-15 RX ORDER — AMIODARONE HYDROCHLORIDE 200 MG/1
200 TABLET ORAL EVERY 12 HOURS
Status: DISCONTINUED | OUTPATIENT
Start: 2022-07-22 | End: 2022-07-20 | Stop reason: HOSPADM

## 2022-07-15 RX ORDER — ALBUMIN, HUMAN INJ 5% 5 %
500 SOLUTION INTRAVENOUS ONCE
Status: COMPLETED | OUTPATIENT
Start: 2022-07-15 | End: 2022-07-15

## 2022-07-15 RX ORDER — AMIODARONE HYDROCHLORIDE 200 MG/1
200 TABLET ORAL DAILY
Status: DISCONTINUED | OUTPATIENT
Start: 2022-08-05 | End: 2022-07-20 | Stop reason: HOSPADM

## 2022-07-15 RX ORDER — MAGNESIUM SULFATE HEPTAHYDRATE 40 MG/ML
2 INJECTION, SOLUTION INTRAVENOUS ONCE
Status: COMPLETED | OUTPATIENT
Start: 2022-07-15 | End: 2022-07-15

## 2022-07-15 RX ORDER — PANTOPRAZOLE SODIUM 40 MG/1
40 TABLET, DELAYED RELEASE ORAL
Status: DISCONTINUED | OUTPATIENT
Start: 2022-07-16 | End: 2022-07-20 | Stop reason: HOSPADM

## 2022-07-15 RX ADMIN — QUETIAPINE FUMARATE 50 MG: 25 TABLET ORAL at 21:17

## 2022-07-15 RX ADMIN — DULOXETINE HYDROCHLORIDE 60 MG: 60 CAPSULE, DELAYED RELEASE ORAL at 08:10

## 2022-07-15 RX ADMIN — AMIODARONE HYDROCHLORIDE 200 MG: 200 TABLET ORAL at 04:27

## 2022-07-15 RX ADMIN — ASPIRIN 325 MG: 325 TABLET, COATED ORAL at 08:10

## 2022-07-15 RX ADMIN — GABAPENTIN 100 MG: 100 CAPSULE ORAL at 21:17

## 2022-07-15 RX ADMIN — HYDROCODONE BITARTRATE AND ACETAMINOPHEN 1 TABLET: 7.5; 325 TABLET ORAL at 06:00

## 2022-07-15 RX ADMIN — Medication 0.02 MCG/KG/MIN: at 01:29

## 2022-07-15 RX ADMIN — AMIODARONE HYDROCHLORIDE 150 MG: 1.5 INJECTION, SOLUTION INTRAVENOUS at 11:49

## 2022-07-15 RX ADMIN — METOPROLOL TARTRATE 25 MG: 25 TABLET, FILM COATED ORAL at 14:10

## 2022-07-15 RX ADMIN — GABAPENTIN 100 MG: 100 CAPSULE ORAL at 08:12

## 2022-07-15 RX ADMIN — GABAPENTIN 100 MG: 100 CAPSULE ORAL at 16:13

## 2022-07-15 RX ADMIN — PANTOPRAZOLE SODIUM 40 MG: 40 INJECTION, POWDER, FOR SOLUTION INTRAVENOUS at 06:00

## 2022-07-15 RX ADMIN — MAGNESIUM SULFATE HEPTAHYDRATE 2 G: 2 INJECTION, SOLUTION INTRAVENOUS at 18:06

## 2022-07-15 RX ADMIN — ATORVASTATIN CALCIUM 40 MG: 40 TABLET, FILM COATED ORAL at 21:17

## 2022-07-15 RX ADMIN — BUSPIRONE HYDROCHLORIDE 10 MG: 10 TABLET ORAL at 21:17

## 2022-07-15 RX ADMIN — BUSPIRONE HYDROCHLORIDE 10 MG: 10 TABLET ORAL at 16:13

## 2022-07-15 RX ADMIN — AMIODARONE HYDROCHLORIDE 0.5 MG/MIN: 1.8 INJECTION, SOLUTION INTRAVENOUS at 16:15

## 2022-07-15 RX ADMIN — SENNOSIDES AND DOCUSATE SODIUM 2 TABLET: 50; 8.6 TABLET ORAL at 08:10

## 2022-07-15 RX ADMIN — ALBUMIN HUMAN 500 ML: 0.05 INJECTION, SOLUTION INTRAVENOUS at 08:10

## 2022-07-15 RX ADMIN — ACETAMINOPHEN 325MG 650 MG: 325 TABLET ORAL at 18:05

## 2022-07-15 RX ADMIN — BUSPIRONE HYDROCHLORIDE 10 MG: 10 TABLET ORAL at 08:53

## 2022-07-15 RX ADMIN — SENNOSIDES AND DOCUSATE SODIUM 2 TABLET: 50; 8.6 TABLET ORAL at 21:17

## 2022-07-15 RX ADMIN — METOPROLOL TARTRATE 25 MG: 25 TABLET, FILM COATED ORAL at 21:17

## 2022-07-15 RX ADMIN — AMIODARONE HYDROCHLORIDE 150 MG: 1.5 INJECTION, SOLUTION INTRAVENOUS at 18:06

## 2022-07-15 RX ADMIN — ACETAMINOPHEN 325MG 650 MG: 325 TABLET ORAL at 11:48

## 2022-07-15 NOTE — PLAN OF CARE
Goal Outcome Evaluation:  Plan of Care Reviewed With: patient        Progress: improving  Outcome Evaluation: Pt remains mostly in afib but occasionally flipping in a normal sinus rhythm. On 1L NC. Amio gtt continues at .5 all day, levo weaned off around 10:30, PO amio held, amio bolus given this afternoon, 25 lopressor given, and then second amio bolus given around 1800. Ambulated x2 today, 220 feet this last time. VSS, will continue to monitor.

## 2022-07-15 NOTE — PROGRESS NOTES
CTS Progress Note       LOS: 3 days   Patient Care Team:  Jerrica Madrid APRN as PCP - General (Family Medicine)    Chief Complaint: Coronary artery disease of native artery of native heart with stable angina pectoris (HCC)    Vital Signs:  Temp:  [96.5 °F (35.8 °C)-99.1 °F (37.3 °C)] 96.6 °F (35.9 °C)  Heart Rate:  [] 100  Resp:  [15-28] 16  BP: ()/() 109/65    Physical Exam: Up in chair.  Ambulatory.  Breathing unlabored       Results:   Results from last 7 days   Lab Units 07/15/22  0118   WBC 10*3/mm3 13.13*   HEMOGLOBIN g/dL 10.2*   HEMATOCRIT % 29.4*   PLATELETS 10*3/mm3 151     Results from last 7 days   Lab Units 07/15/22  0118   SODIUM mmol/L 136   POTASSIUM mmol/L 4.2   CHLORIDE mmol/L 101   CO2 mmol/L 26.0   BUN mg/dL 10   CREATININE mg/dL 0.77   GLUCOSE mg/dL 139*   CALCIUM mg/dL 9.0           Imaging Results (Last 24 Hours)     Procedure Component Value Units Date/Time    XR Chest 1 View [[id number]] Resulted: 07/15/22 0554     Updated: 07/15/22 0555    XR Chest 1 View [[id number]] Collected: 07/14/22 0800     Updated: 07/14/22 0805    Narrative:      DATE OF EXAM: 7/14/2022 5:07 AM     PROCEDURE: XR CHEST 1 VW-     INDICATIONS: f/u; I25.118-Atherosclerotic heart disease of native  coronary artery with other forms of angina pectoris.      COMPARISON: Chest x-ray 7/13/2012     TECHNIQUE: Single frontal view of the chest.     FINDINGS:  Stable medical support devices. Stable cardiomediastinal silhouette with  mild cardiomegaly. Persistent low lung volumes with streaky left greater  than right bibasilar opacities, likely atelectasis. No definite pleural  effusion. No evidence of pneumothorax.       Impression:      Persistent low lung volumes with bibasilar atelectasis.     This report was finalized on 7/14/2022 8:02 AM by Kris Harrington MD.             Assessment      Coronary artery disease of native artery of native heart with stable angina pectoris (HCC)    Hypertension     Hyperlipidemia    Anxiety    Acid reflux    TAMIKO (obstructive sleep apnea)    Depression    Bipolar 1 disorder (HCC)    PTSD (post-traumatic stress disorder)    Marijuana use    Hypothyroidism        Plan   Discontinue pacer wires but do not discontinue chest tubes.  CBC and BMP and chest x-ray in the a.m.    Please note that portions of this note were completed with a voice recognition program. Efforts were made to edit the dictations, but occasionally words are mistranscribed.    Baldomero Anton MD  07/15/22  07:09 EDT

## 2022-07-15 NOTE — PROGRESS NOTES
INTENSIVIST / PULMONARY FOLLOW UP NOTE     Hospital:  LOS: 3 days   Mr. Josh Horan, 61 y.o. male is followed for:     Coronary artery disease of native artery of native heart with stable angina pectoris (HCC)    Hypertension    Hyperlipidemia    Anxiety    Acid reflux    TAMIKO (obstructive sleep apnea)    Depression    Bipolar 1 disorder (HCC)    PTSD (post-traumatic stress disorder)    Marijuana use    Hypothyroidism          SUBJECTIVE     Developed afib again overnight which made him hypotensive    The patient's relevant past medical, surgical, family, and social history were reviewed    Allergies and medications were reviewed    ROS:  Per HPI otherwise negative          OBJECTIVE     Vital Sign Min/Max for last 24 hours:  Temp  Min: 96.5 °F (35.8 °C)  Max: 99.1 °F (37.3 °C)   BP  Min: 72/40  Max: 165/97   Pulse  Min: 65  Max: 141   Resp  Min: 15  Max: 28   SpO2  Min: 92 %  Max: 99 %   No data recorded     Physical Exam:  General Appearance:  No acute distress  Eyes:  No scleral icterus or pallor, pupils normal  Ears, Nose, Mouth, Throat:  Atraumatic, oropharynx clear  Neck:  Trachea midline, thyroid normal  Respiratory:  Clear to auscultation bilaterally, normal effort  Cardiovascular:  irregular rate and rhythm, no murmurs, no peripheral edema  Gastrointestinal:  Soft, non-tender, non-distended, no hepatosplenomegaly  Skin:  Normal temperature, no rash  Psychiatric:  No agitation  Neuro:  No new focal neurologic deficits observed    Telemetry:              Hemodynamics:   CVP:     PAP:     PAOP:     CO: CO (L/min): 6.2 L/min   CI: CI (L/min/m2): 2.97 L/min/m2   SVI: SVI: 45   SVR:       SpO2: 95 % SpO2  Min: 92 %  Max: 99 %   Device:      Flow Rate:   No data recorded         Intake/Ouptut 24 hrs (7:00AM - 6:59 AM)  Intake & Output (last 3 days)       07/12 0701  07/13 0700 07/13 0701  07/14 0700 07/14 0701  07/15 0700 07/15 0701  07/16 0700    P.O.   870 120    I.V. (mL/kg) 1265.1 (12.9)  471.8 (4.8)      Blood 1200       NG/GT 60       IV Piggyback 1500       Total Intake(mL/kg) 4025.1 (41.1)  1341.8 (13.7) 120 (1.2)    Urine (mL/kg/hr) 3380 1595 (0.7) 2225 (0.9)     Chest Tube 670 530 440 50    Total Output 4050 2125 2665 50    Net -24.9 -2125 -1323.2 +70                  Lines, Drains & Airways     Active LDAs     Name Placement date Placement time Site Days    Peripheral IV 04/27/22 1222 Right Antecubital 04/27/22  1222  Antecubital  75                Hematology:  Results from last 7 days   Lab Units 07/15/22  0118 07/14/22  0259 07/13/22  0208 07/12/22  1942 07/12/22  1558 07/12/22  1439 07/12/22  1347 07/12/22  1118 07/11/22  1209   WBC 10*3/mm3 13.13* 12.37* 10.92* 8.69 8.26  --   --   --  10.24   HEMOGLOBIN g/dL 10.2* 9.7* 10.4* 9.5* 10.1*  --   --   --  14.6   HEMOGLOBIN, POC g/dL  --   --   --   --   --  9.2* 11.2*   < >  --    HEMATOCRIT % 29.4* 28.9* 30.1* 26.5* 28.5*  --   --   --  42.3   HEMATOCRIT POC %  --   --   --   --   --  27* 33*   < >  --    PLATELETS 10*3/mm3 151 141 176 134* 120*  --   --   --  212    < > = values in this interval not displayed.     Electrolytes, Magnesium and Phosphorus:  Results from last 7 days   Lab Units 07/15/22  0118 07/14/22  1533 07/14/22  0259 07/13/22  0208 07/12/22  1942 07/12/22  1558 07/11/22  1209   SODIUM mmol/L 136  --  138 140 141 140 140   CHLORIDE mmol/L 101  --  103 107 106 107 105   POTASSIUM mmol/L 4.2  --  3.9 4.0 3.7 3.6  3.6 4.1   CO2 mmol/L 26.0  --  28.0 25.0 25.0 22.0 26.0   MAGNESIUM mg/dL 2.1  --  2.5 2.4 2.4 1.6 2.0   PHOSPHORUS mg/dL 2.7 2.2* 2.4* 2.4* 2.8 2.5  --      Renal:  Results from last 7 days   Lab Units 07/15/22  0118 07/14/22  0259 07/13/22  0208 07/12/22  1942 07/12/22  1558 07/11/22  1209   CREATININE mg/dL 0.77 0.68* 0.78 0.80 0.73* 0.84   BUN mg/dL 10 10 11 11 10 14     Estimated Creatinine Clearance: 115.3 mL/min (by C-G formula based on SCr of 0.77 mg/dL).  Hepatic:  Results from last 7 days   Lab Units 07/15/22  0118  07/14/22  0259 07/11/22  1209   ALK PHOS U/L 61 53 91   BILIRUBIN mg/dL 0.7 1.1 0.3   ALT (SGPT) U/L 8 11 12   AST (SGOT) U/L 22 36 13     Arterial Blood Gases:  Results from last 7 days   Lab Units 07/13/22  0106 07/12/22  2149 07/12/22  1635 07/12/22  1439 07/12/22  1347 07/12/22  1313 07/12/22  1247   PH, ARTERIAL pH units 7.392 7.283* 7.394 7.36 7.38 7.37 7.30*   PCO2, ARTERIAL mm Hg 40.1 52.4* 41.2  --   --   --   --    PO2 ART mm Hg 111.0* 75.8* 226.0*  --   --   --   --    FIO2 % 40 40 100  --   --   --   --        Results from last 7 days   Lab Units 07/11/22  1209   HEMOGLOBIN A1C % 5.40       No results found for: LACTATE    Relevant imaging studies and labs from 07/15/22 were reviewed    Medications (drips):            Assessment & Plan   IMPRESSION / PLAN     Inpatient Problem List:  61 y.o.male:  Active Hospital Problems    Diagnosis    • **Coronary artery disease of native artery of native heart with stable angina pectoris (HCC)      S/p CABG by Dr. Anton 7/12/22     • Hypertension    • Hyperlipidemia    • Anxiety    • Acid reflux    • TAMIKO (obstructive sleep apnea)      doesnt use machine     • Depression    • Bipolar 1 disorder (HCC)    • PTSD (post-traumatic stress disorder)    • Marijuana use    • Hypothyroidism         Hospital Course:  61 y.o.male with relevant PMH of HTN, HLP, GERD, marijuana use, hypothyroidism, TAMIKO not CPAP compliant, anxiety, PTSD, depression, bipolar DO, recently diagnosed [facility]AD admitted 7/12/2022 post op 5vCABG by Dr. Anton.    Impression:  Went back into afib and got hypotensive on levophed    Plan:    Pain  PTSD  Bipolar d/o  Titrate narcotics and stool softeners .  Resumed home psych meds    Hypoxemia  IS, mobilize.    Hypothyroidism  Resumed levothyroxine     Stress Hyperglycemia A1c 5.4  D/c FS    Nausea  Prn zofran, PPI    CAD  HTN  HLD  Afib  On amio gtt. Give additional 150 mg bolus - will hopefully convert.  Otherwise per Cardiology.    Hypotension  Wean  levophed as able    Post op Care  Per CTS    DVT / PUD Prophylaxis  Foot pumps, SQ Heparin when tubes/wires out, Protonix    Nutrition  Dietary Orders (From admission, onward)     Start     Ordered    07/14/22 1400  Diet Regular; Cardiac  Diet Effective 1400        Question Answer Comment   Diet Texture / Consistency Regular    Common Modifiers Cardiac        07/14/22 1353    07/14/22 1003  Dietary Nutrition Supplements Premier Protein  Daily With Lunch      Comments: OK to send on clear liquid diet too - Premier Protein daily with Lunch. Per RD.   Question:  Select Supplement:  Answer:  Premier Protein    07/14/22 1002    07/14/22 1002  Dietary Nutrition Supplements Boost Plus; strawberry  Daily With Breakfast & Dinner      Comments: OK to send on clear liquid diet too - Strawberry Boost Plus 2x daily (Breakfast/Dinner). Per RD.   Question Answer Comment   Select Supplement: Boost Plus    Flavor: strawberry        07/14/22 1001              Plan of care and goals reviewed with multidisciplinary team at daily rounds    High risk secondary to persistent vasopressor use, recurrent afib requiring amio gtt + bolus, post op major surgery with risk factors.         Norman Meléndez MD  Intensive Care Medicine  07/15/22 10:42 EDT

## 2022-07-15 NOTE — PROGRESS NOTES
" [facility] - Progress Note    Josh Horan  1960  [id number]    07/15/22, 09:53 EDT      Chief Complaint: Following for post op management of BP, arrhythmias    Subjective:    Sitting up in chair.  Doing \"fair.\"  Remains in AFib.    Review of Systems:  Pertinent positives are listed above and in physical exam.  All others have been reviewed and are negative.    acetaminophen, 650 mg, Oral, Q8H  albumin human, 500 mL, Intravenous, Once  amiodarone, 200 mg, Oral, Q8H   Followed by  [START ON 7/22/2022] amiodarone, 200 mg, Oral, Q12H   Followed by  [START ON 8/5/2022] amiodarone, 200 mg, Oral, Daily  aspirin, 325 mg, Oral, Daily  atorvastatin, 40 mg, Oral, Nightly  busPIRone, 10 mg, Oral, TID  chlorhexidine, 15 mL, Mouth/Throat, Q12H  DULoxetine, 60 mg, Oral, Daily  gabapentin, 100 mg, Oral, TID  metoprolol tartrate, 25 mg, Oral, Q12H  [START ON 7/16/2022] pantoprazole, 40 mg, Oral, Q AM  pharmacy consult - MTM, , Does not apply, Daily  QUEtiapine, 50 mg, Oral, Nightly  senna-docusate sodium, 2 tablet, Oral, BID        Objective:  Vitals:   height is 170.2 cm (67\") and weight is 98 kg (216 lb). His axillary temperature is 97.8 °F (36.6 °C). His blood pressure is 110/77 and his pulse is 116. His respiration is 20 and oxygen saturation is 95%.       Intake/Output Summary (Last 24 hours) at 7/15/2022 0953  Last data filed at 7/15/2022 0800  Gross per 24 hour   Intake 1221.8 ml   Output 2685 ml   Net -1463.2 ml       Physical Exam:  General:  WN, NAD, A and O x3.  CV:  Irregular, tachy. No murmur, rub, or gallop.  Resp:  CTA Tay, equal, nonlabored.  Abd:  Soft, + BS, no organomegaly. Nontender to palpation.  Extrem:  No edema BLE, 2+ pedal/PT pulses.            Results from last 7 days   Lab Units 07/15/22  0118   WBC 10*3/mm3 13.13*   HEMOGLOBIN g/dL 10.2*   HEMATOCRIT % 29.4*   PLATELETS 10*3/mm3 151     Results from last 7 days   Lab Units 07/15/22  0118   SODIUM mmol/L 136   POTASSIUM mmol/L 4.2 "   CHLORIDE mmol/L 101   CO2 mmol/L 26.0   BUN mg/dL 10   CREATININE mg/dL 0.77   CALCIUM mg/dL 9.0   BILIRUBIN mg/dL 0.7   ALK PHOS U/L 61   ALT (SGPT) U/L 8   AST (SGOT) U/L 22   GLUCOSE mg/dL 139*     Results from last 7 days   Lab Units 07/13/22  0208 07/12/22  1558 07/11/22  1209   INR  1.20* 1.44* 0.97     Results from last 7 days   Lab Units 07/14/22  0259   TSH uIU/mL 0.219*   FREE T4 ng/dL 1.91*               Tele:  Atrial  Fibrillation    Assessment and Plan:  1.  Essential  Hypertension              -  Controlled currently              -  Continue Lopressor 25mg BID ordered, titrate as necessary     2.  Hyperlipidemia              -  Continue high intensity statin, appropriate diet     3.  Coronary Artery Disease              -  Presents with 3 months progressive dyspnea and chest pain.  MV CAD by [facility].              -  EF 50-55% by echo 4/2022              -  POD 3 p CABG, Dr. Anton:  LIMA-LAD, SVG-RCA, SVG- DX of LAD, SVG- OM1-OM2.  REGINA ligation with 35 atrial clip.              -  Continue support.     4.  Hypothyroidism              -  Currently on no supplementation.              -  Labs are abnormal.  Per Intensivist    5.  Post Op Atrial Fibrillation with RVR   -  Amiodarone protocol initiated.  Re bolus today.   -  Continue to monitor.        62 yo CM with HTN, HLP, TAMIKO, hypothyroid, marijuana use, PTSD, Bipolar with progressive angina and dyspnea found to have MV CAD by [facility].  Now POD 3 after CABG, normal EF.  On BB/Amio per protocol for post op AFib.          I discussed the patient's findings and my recommendations with the patient, any present family members, and the nursing staff.  Deandre Horowitz MD saw and examined patient, verified hx and PE, read all radiographic studies, reviewed labs and micro data, and formulated dx, plan for treatment and all medical decision making.      Jeannine Robison PA-C  07/15/22, 09:56 EDT

## 2022-07-15 NOTE — PLAN OF CARE
Goal Outcome Evaluation:  Plan of Care Reviewed With: (P) patient        Progress: (P) improving  Outcome Evaluation: (P) Pt increased ambulation distance to 220 ft CGA w/ B UE hugging sternal pillow. Pt needing cues to increase stride length and gait speed. Pt in Afib during treatment, w/ highest HR reading 141 BPM during ambulation; RN aware and assisted w/ treatment. Cont w/ skilled IPPT. PT rec d/c home w/ assist.

## 2022-07-15 NOTE — CASE MANAGEMENT/SOCIAL WORK
Continued Stay Note  [facility]     Patient Name: Josh Horan  MRN: [id number]  Today's Date: 7/15/2022    Admit Date: 7/12/2022     Discharge Plan     Row Name 07/15/22 1432       Plan    Plan home with family    Patient/Family in Agreement with Plan yes    Plan Comments SW met with pt this morning.  Pt stated his discharge plan remains that he will return home with son in [facility].  Pt will stay in ICU until Sat.  He still has chest tubes and R IJ.  Pt denies having any discharge needs.  CM/SW will remain available to assist with any discharge needs.               Discharge Codes    No documentation.               Expected Discharge Date and Time     Expected Discharge Date Expected Discharge Time    Jul 19, 2022             KAMI Cosby

## 2022-07-15 NOTE — THERAPY TREATMENT NOTE
Patient Name: Josh Horan  : 1960    MRN: [id number]                              Today's Date: 7/15/2022       Admit Date: 2022    Visit Dx:     ICD-10-CM ICD-9-CM   1. Coronary artery disease of native artery of native heart with stable angina pectoris (HCC)  I25.118 414.01     413.9     Patient Active Problem List   Diagnosis   • Coronary artery disease of native artery of native heart with stable angina pectoris (HCC)   • Abnormal findings on diagnostic imaging of heart and coronary circulation   • Hypertension   • Hyperlipidemia   • Anxiety   • Acid reflux   • TAMIKO (obstructive sleep apnea)   • Depression   • Bipolar 1 disorder (HCC)   • PTSD (post-traumatic stress disorder)   • Marijuana use   • Hypothyroidism     Past Medical History:   Diagnosis Date   • Acid reflux    • Anxiety    • Arthritis    • Back pain    • Bipolar 1 disorder (HCC)    • Coronary artery disease    • Depression    • Headache    • Hiatal hernia    • Hyperlipidemia    • Hypertension    • Hyperthyroidism    • Hypothyroidism    • Lyme disease    • Migraine    • MRSA infection     1.5-2 years ago- back   • Panic attacks    • PTSD (post-traumatic stress disorder)    • Sciatic nerve pain    • Seasonal allergies    • Sleep apnea     doesnt use machine   • Tinnitus    • Wears glasses      Past Surgical History:   Procedure Laterality Date   • CARDIAC CATHETERIZATION      2022 Dr. Salguero   • CORONARY ARTERY BYPASS GRAFT N/A 2022    Procedure: MEDIAN STERNOTOMY CORONARY ARTERY BYPASS GRAFTING X5 , UTILIZING THE LEFT INTERNAL MAMMERY GRAFT, ENDOSCOPIC VEIN HARVEST OF THE GREATER RIGHT SAPHENOUS VEIN WITH EXPLORATION OF THE LEFT LEG;  Surgeon: Baldomero Anton MD;  Location: [facility];  Service: Cardiothoracic;  Laterality: N/A;   • HERNIA REPAIR Left     umbilical hernia repair - pt unsure about mesh   • OTHER SURGICAL HISTORY      Lymph node removal neck   • VASECTOMY        General Information     Row Name 07/15/22 1057           Physical Therapy Time and Intention    Document Type therapy note (daily note) (P)   -WJ     Mode of Treatment physical therapy (P)   -WJ     Row Name 07/15/22 1057          General Information    Patient Profile Reviewed yes (P)   -WJ     Existing Precautions/Restrictions cardiac;fall;oxygen therapy device and L/min;sternal (P)   -WJ     Barriers to Rehab medically complex (P)   -WJ     Row Name 07/15/22 1057          Cognition    Orientation Status (Cognition) oriented x 3 (P)   -WJ     Row Name 07/15/22 1057          Safety Issues, Functional Mobility    Safety Issues Affecting Function (Mobility) awareness of need for assistance;insight into deficits/self-awareness;safety precaution awareness;safety precautions follow-through/compliance;sequencing abilities (P)   -WJ     Impairments Affecting Function (Mobility) balance;endurance/activity tolerance;shortness of breath;strength (P)   -WJ     Comment, Safety Issues/Impairments (Mobility) Pt in Afib, w/ highest HR reading 141 BPM during ambulation; RN aware (P)   -WJ           User Key  (r) = Recorded By, (t) = Taken By, (c) = Cosigned By    Initials Name Provider Type    W Panda Blum, PT Student PT Student               Mobility     Row Name 07/15/22 1058          Bed Mobility    Comment, (Bed Mobility) Pt UIC (P)   -WJ     Row Name 07/15/22 1058          Transfers    Comment, (Transfers) Cues needed for sequencing, LE positioning safe hand placement to maintain sternal precautions. (P)   -WJ     Row Name 07/15/22 1058          Sit-Stand Transfer    Sit-Stand [place] (Transfers) contact guard;1 person assist (P)   -WJ     Assistive Device (Sit-Stand Transfers) other (see comments) (P)   B UE hugging sternal pillow  -WJ     Comment, (Sit-Stand Transfer) Pt w/ good balance and strength during STS, required cues for sternal precautions and sequencing (P)   -WJ     Row Name 07/15/22 1058          Gait/Stairs (Locomotion)    [place] Level  (Gait) contact guard;1 person to manage equipment;1 person assist (P)   -WJ     Assistive Device (Gait) other (see comments) (P)   B UE hugging sternal pillow  -WJ     Distance in Feet (Gait) 220 (P)   -WJ     Deviations/Abnormal Patterns (Gait) stride length decreased;gait speed decreased;base of support, wide (P)   -WJ     Bilateral Gait Deviations forward flexed posture (P)   -WJ     Comment, (Gait/Stairs) Pt w/ step through gait pattern, reduced gait speed and stride length, but no significant unsteadiness noted. (P)   -WJ           User Key  (r) = Recorded By, (t) = Taken By, (c) = Cosigned By    Initials Name Provider Type    WJ Panda Blum, PT Student PT Student               Obj/Interventions     Row Name 07/15/22 1101          Motor Skills    Therapeutic Exercise hip;ankle;knee (P)   -WJ     Row Name 07/15/22 1101          Hip (Therapeutic Exercise)    Hip (Therapeutic Exercise) AROM (active range of motion) (P)   -WJ     Hip AROM (Therapeutic Exercise) bilateral;flexion;sitting;10 repetitions (P)   -WJ     Row Name 07/15/22 1101          Knee (Therapeutic Exercise)    Knee (Therapeutic Exercise) AROM (active range of motion) (P)   -WJ     Knee AROM (Therapeutic Exercise) LAQ (long arc quad);bilateral;10 repetitions;sitting (P)   -WJ     Row Name 07/15/22 1101          Ankle (Therapeutic Exercise)    Ankle (Therapeutic Exercise) AROM (active range of motion) (P)   -WJ     Ankle AROM (Therapeutic Exercise) bilateral;dorsiflexion;plantarflexion;20 repititions;sitting (P)   -WJ     Row Name 07/15/22 1101          Balance    Balance Assessment sitting static balance;sitting dynamic balance;sit to stand dynamic balance;standing static balance;standing dynamic balance (P)   -WJ     Static Sitting Balance independent (P)   -WJ     Dynamic Sitting Balance independent (P)   -WJ     Position, Sitting Balance unsupported;sitting in chair (P)   -WJ     Sit to Stand Dynamic Balance 1-person assist;contact guard (P)    -WJ     Static Standing Balance contact guard;1-person assist (P)   -WJ     Dynamic Standing Balance contact guard;1-person assist (P)   -WJ     Position/Device Used, Standing Balance unsupported (P)   B UE hugging sternal pillow  -WJ     Balance Interventions sitting;standing;sit to stand;static;dynamic;moderate challenge (P)   -WJ     Comment, Balance Pt w/ overall good balance, no unsteadiness or LOB during ambulation (P)   -WJ           User Key  (r) = Recorded By, (t) = Taken By, (c) = Cosigned By    Initials Name Provider Type     Panda Blum, PT Student PT Student               Goals/Plan     Row Name 07/15/22 1106          Therapy Assessment/Plan (PT)    Planned Therapy Interventions (PT) balance training;bed mobility training;gait training;home exercise program;postural re-education;patient/family education;transfer training;strengthening;neuromuscular re-education (P)   -WJ           User Key  (r) = Recorded By, (t) = Taken By, (c) = Cosigned By    Initials Name Provider Type     Panda Blum, PT Student PT Student               Clinical Impression     Row Name 07/15/22 1102          Pain    Pretreatment Pain Rating 0/10 - no pain (P)   -WJ     Posttreatment Pain Rating 0/10 - no pain (P)   -WJ     Row Name 07/15/22 1102          Plan of Care Review    Plan of Care Reviewed With patient (P)   -WJ     Progress improving (P)   -WJ     Outcome Evaluation Pt increased ambulation distance to 220 ft CGA w/ B UE hugging sternal pillow. Pt needing cues to increase stride length and gait speed. Pt in Afib during treatment, w/ highest HR reading 141 BPM during ambulation; RN aware and assisted w/ treatment. Cont w/ skilled IPPT. PT rec d/c home w/ assist. (P)   -WJ     Row Name 07/15/22 1102          Therapy Assessment/Plan (PT)    Patient/Family Therapy Goals Statement (PT) return to PLOF (P)   -WJ     Rehab Potential (PT) good, to achieve stated therapy goals (P)   -WJ     Criteria for Skilled  Interventions Met (PT) yes;skilled treatment is necessary (P)   -WJ     Therapy Frequency (PT) daily (P)   -WJ     Row Name 07/15/22 1102          Vital Signs    Pre Systolic BP Rehab 107 (P)   -WJ     Pre Treatment Diastolic BP 73 (P)   -WJ     Post Systolic BP Rehab 124 (P)   -WJ     Post Treatment Diastolic BP 69 (P)   -WJ     Pretreatment Heart Rate (beats/min) 113 (P)   -WJ     Intratreatment Heart Rate (beats/min) 141 (P)   -WJ     Posttreatment Heart Rate (beats/min) 112 (P)   patient is in a-Fib today RN is aware and assists with tx.  -WJ     Pre SpO2 (%) 97 (P)   -WJ     O2 Delivery Pre Treatment nasal cannula (P)   -WJ     O2 Delivery Intra Treatment nasal cannula (P)   -WJ     Post SpO2 (%) 94 (P)   -WJ     O2 Delivery Post Treatment nasal cannula (P)   -WJ     Pre Patient Position Sitting (P)   -WJ     Intra Patient Position Standing (P)   -WJ     Post Patient Position Sitting (P)   -WJ     Row Name 07/15/22 1102          Positioning and Restraints    Pre-Treatment Position sitting in chair/recliner (P)   -WJ     Post Treatment Position chair (P)   -WJ     In Chair notified nsg;reclined;sitting;call light within reach;encouraged to call for assist;exit alarm on;waffle cushion;legs elevated;heels elevated (P)   -WJ           User Key  (r) = Recorded By, (t) = Taken By, (c) = Cosigned By    Initials Name Provider Type    WPanda Mustafa, PT Student PT Student               Outcome Measures     Row Name 07/15/22 1106 07/15/22 0800       How much help from another person do you currently need...    Turning from your back to your side while in flat bed without using bedrails? 3 (P)   -WJ 3  -KK    Moving from lying on back to sitting on the side of a flat bed without bedrails? 3 (P)   -WJ 3  -KK    Moving to and from a bed to a chair (including a wheelchair)? 3 (P)   -WJ 3  -KK    Standing up from a chair using your arms (e.g., wheelchair, bedside chair)? 3 (P)   -WJ 3  -KK    Climbing 3-5 steps with a  railing? 3 (P)   -WJ 2  -KK    To walk in hospital room? 3 (P)   -WJ 3  -KK    AM-PAC 6 Clicks Score (PT) 18 (P)   -WJ 17  -KK    Highest level of mobility 6 --> Walked 10 steps or more (P)   -WJ 5 --> Static standing  -KK    Row Name 07/15/22 1106          Functional Assessment    Outcome Measure Options AM-PAC 6 Clicks Basic Mobility (PT) (P)   -WJ           User Key  (r) = Recorded By, (t) = Taken By, (c) = Cosigned By    Initials Name Provider Type    KK Lisa Neal, RN Registered Nurse    Panda Neil, PT Student PT Student                             Physical Therapy Education                 Title: PT OT SLP Therapies (Done)     Topic: Physical Therapy (Done)     Point: Mobility training (Done)     Learning Progress Summary           Patient Acceptance, E, VU,DU by W at 7/15/2022 1106    Acceptance, E, NR by SILVIO at 7/14/2022 0915    Acceptance, E, NR by KG at 7/13/2022 1007                   Point: Home exercise program (Done)     Learning Progress Summary           Patient Acceptance, E, VU,DU by W at 7/15/2022 1106    Acceptance, E, NR by SILVIO at 7/14/2022 0915    Acceptance, E, NR by KG at 7/13/2022 1007                   Point: Body mechanics (Done)     Learning Progress Summary           Patient Acceptance, E, VU,DU by W at 7/15/2022 1106    Acceptance, E, NR by SILVIO at 7/14/2022 0915    Acceptance, E, NR by KG at 7/13/2022 1007                   Point: Precautions (Done)     Learning Progress Summary           Patient Acceptance, E, VU,DU by W at 7/15/2022 1106    Acceptance, E, NR by SILVIO at 7/14/2022 0915    Acceptance, E, NR by KG at 7/13/2022 1007                               User Key     Initials Effective Dates Name Provider Type Discipline    SILVIO 06/16/21 -  Giselle Darnell, PT Physical Therapist PT    KG 05/22/20 -  Gabriella Pelaez, PT Physical Therapist PT    W 05/31/22 -  Panda Blum, PT Student PT Student PT              PT Recommendation and Plan  Planned Therapy  Interventions (PT): (P) balance training, bed mobility training, gait training, home exercise program, postural re-education, patient/family education, transfer training, strengthening, neuromuscular re-education  Plan of Care Reviewed With: (P) patient  Progress: (P) improving  Outcome Evaluation: (P) Pt increased ambulation distance to 220 ft CGA w/ B UE hugging sternal pillow. Pt needing cues to increase stride length and gait speed. Pt in Afib during treatment, w/ highest HR reading 141 BPM during ambulation; RN aware and assisted w/ treatment. Cont w/ skilled IPPT. PT rec d/c home w/ assist.     Time Calculation:    PT Charges     Row Name 07/15/22 1107             Time Calculation    Start Time 0954 (P)   -WJ      PT Received On 07/15/22 (P)   -WJ      PT Goal Re-Cert Due Date 07/23/22 (P)   -WJ              Time Calculation- PT    Total Timed Code Minutes- PT 24 minute(s) (P)   -WJ              Timed Charges    68020 - PT Therapeutic Activity Minutes 24 (P)   -WJ              Total Minutes    Timed Charges Total Minutes 24 (P)   -WJ       Total Minutes 24 (P)   -WJ            User Key  (r) = Recorded By, (t) = Taken By, (c) = Cosigned By    Initials Name Provider Type    WPanda Mustafa PT Student PT Student              Therapy Charges for Today     Code Description Service Date Service Provider Modifiers Qty    [id number]  PT THERAPEUTIC ACT EA 15 MIN 7/15/2022 Panda Blum PT Student GP 2          PT G-Codes  Outcome Measure Options: (P) AM-PAC 6 Clicks Basic Mobility (PT)  AM-PAC 6 Clicks Score (PT): (P) 18    PANDA BLUM PT Student  7/15/2022

## 2022-07-16 ENCOUNTER — APPOINTMENT (OUTPATIENT)
Dept: GENERAL RADIOLOGY | Facility: HOSPITAL | Age: 62
End: 2022-07-16

## 2022-07-16 LAB
ALBUMIN SERPL-MCNC: 3.9 G/DL (ref 3.5–5.2)
ALBUMIN/GLOB SERPL: 2 G/DL
ALP SERPL-CCNC: 73 U/L (ref 39–117)
ALT SERPL W P-5'-P-CCNC: 8 U/L (ref 1–41)
ANION GAP SERPL CALCULATED.3IONS-SCNC: 14 MMOL/L (ref 5–15)
AST SERPL-CCNC: 13 U/L (ref 1–40)
BASOPHILS # BLD AUTO: 0.06 10*3/MM3 (ref 0–0.2)
BASOPHILS NFR BLD AUTO: 0.5 % (ref 0–1.5)
BILIRUB SERPL-MCNC: 0.8 MG/DL (ref 0–1.2)
BUN SERPL-MCNC: 13 MG/DL (ref 6–20)
BUN/CREAT SERPL: 17.6 (ref 7–25)
CALCIUM SPEC-SCNC: 8.9 MG/DL (ref 8.6–10.5)
CHLORIDE SERPL-SCNC: 102 MMOL/L (ref 98–107)
CO2 SERPL-SCNC: 23 MMOL/L (ref 22–29)
CREAT SERPL-MCNC: 0.74 MG/DL (ref 0.76–1.27)
DEPRECATED RDW RBC AUTO: 41.8 FL (ref 37–54)
EGFRCR SERPLBLD CKD-EPI 2021: 104.4 ML/MIN/1.73
EOSINOPHIL # BLD AUTO: 0.47 10*3/MM3 (ref 0–0.4)
EOSINOPHIL NFR BLD AUTO: 4 % (ref 0.3–6.2)
ERYTHROCYTE [DISTWIDTH] IN BLOOD BY AUTOMATED COUNT: 12.8 % (ref 12.3–15.4)
GLOBULIN UR ELPH-MCNC: 2 GM/DL
GLUCOSE SERPL-MCNC: 119 MG/DL (ref 65–99)
HCT VFR BLD AUTO: 29.6 % (ref 37.5–51)
HGB BLD-MCNC: 10.2 G/DL (ref 13–17.7)
IMM GRANULOCYTES # BLD AUTO: 0.05 10*3/MM3 (ref 0–0.05)
IMM GRANULOCYTES NFR BLD AUTO: 0.4 % (ref 0–0.5)
LYMPHOCYTES # BLD AUTO: 2.63 10*3/MM3 (ref 0.7–3.1)
LYMPHOCYTES NFR BLD AUTO: 22.6 % (ref 19.6–45.3)
MAGNESIUM SERPL-MCNC: 2.1 MG/DL (ref 1.6–2.6)
MCH RBC QN AUTO: 31 PG (ref 26.6–33)
MCHC RBC AUTO-ENTMCNC: 34.5 G/DL (ref 31.5–35.7)
MCV RBC AUTO: 90 FL (ref 79–97)
MONOCYTES # BLD AUTO: 1.03 10*3/MM3 (ref 0.1–0.9)
MONOCYTES NFR BLD AUTO: 8.9 % (ref 5–12)
NEUTROPHILS NFR BLD AUTO: 63.6 % (ref 42.7–76)
NEUTROPHILS NFR BLD AUTO: 7.38 10*3/MM3 (ref 1.7–7)
NRBC BLD AUTO-RTO: 0 /100 WBC (ref 0–0.2)
PHOSPHATE SERPL-MCNC: 2.2 MG/DL (ref 2.5–4.5)
PLATELET # BLD AUTO: 185 10*3/MM3 (ref 140–450)
PMV BLD AUTO: 11 FL (ref 6–12)
POTASSIUM SERPL-SCNC: 4.2 MMOL/L (ref 3.5–5.2)
PROT SERPL-MCNC: 5.9 G/DL (ref 6–8.5)
QT INTERVAL: 314 MS
QTC INTERVAL: 454 MS
RBC # BLD AUTO: 3.29 10*6/MM3 (ref 4.14–5.8)
SODIUM SERPL-SCNC: 139 MMOL/L (ref 136–145)
WBC NRBC COR # BLD: 11.62 10*3/MM3 (ref 3.4–10.8)

## 2022-07-16 PROCEDURE — 93005 ELECTROCARDIOGRAM TRACING: CPT | Performed by: NURSE PRACTITIONER

## 2022-07-16 PROCEDURE — 83735 ASSAY OF MAGNESIUM: CPT | Performed by: INTERNAL MEDICINE

## 2022-07-16 PROCEDURE — 71045 X-RAY EXAM CHEST 1 VIEW: CPT

## 2022-07-16 PROCEDURE — 25010000002 FUROSEMIDE PER 20 MG: Performed by: THORACIC SURGERY (CARDIOTHORACIC VASCULAR SURGERY)

## 2022-07-16 PROCEDURE — 99232 SBSQ HOSP IP/OBS MODERATE 35: CPT | Performed by: INTERNAL MEDICINE

## 2022-07-16 PROCEDURE — 80053 COMPREHEN METABOLIC PANEL: CPT | Performed by: INTERNAL MEDICINE

## 2022-07-16 PROCEDURE — 93010 ELECTROCARDIOGRAM REPORT: CPT | Performed by: STUDENT IN AN ORGANIZED HEALTH CARE EDUCATION/TRAINING PROGRAM

## 2022-07-16 PROCEDURE — 85025 COMPLETE CBC W/AUTO DIFF WBC: CPT | Performed by: INTERNAL MEDICINE

## 2022-07-16 PROCEDURE — 99024 POSTOP FOLLOW-UP VISIT: CPT | Performed by: THORACIC SURGERY (CARDIOTHORACIC VASCULAR SURGERY)

## 2022-07-16 PROCEDURE — 84100 ASSAY OF PHOSPHORUS: CPT | Performed by: INTERNAL MEDICINE

## 2022-07-16 RX ORDER — FUROSEMIDE 10 MG/ML
40 INJECTION INTRAMUSCULAR; INTRAVENOUS ONCE
Status: COMPLETED | OUTPATIENT
Start: 2022-07-16 | End: 2022-07-16

## 2022-07-16 RX ORDER — METOPROLOL TARTRATE 50 MG/1
50 TABLET, FILM COATED ORAL EVERY 12 HOURS SCHEDULED
Status: DISCONTINUED | OUTPATIENT
Start: 2022-07-16 | End: 2022-07-20 | Stop reason: HOSPADM

## 2022-07-16 RX ADMIN — AMIODARONE HYDROCHLORIDE 200 MG: 200 TABLET ORAL at 08:32

## 2022-07-16 RX ADMIN — GABAPENTIN 100 MG: 100 CAPSULE ORAL at 08:32

## 2022-07-16 RX ADMIN — BUSPIRONE HYDROCHLORIDE 10 MG: 10 TABLET ORAL at 16:01

## 2022-07-16 RX ADMIN — GABAPENTIN 100 MG: 100 CAPSULE ORAL at 16:01

## 2022-07-16 RX ADMIN — AMIODARONE HYDROCHLORIDE 200 MG: 200 TABLET ORAL at 16:01

## 2022-07-16 RX ADMIN — BUSPIRONE HYDROCHLORIDE 10 MG: 10 TABLET ORAL at 19:49

## 2022-07-16 RX ADMIN — GABAPENTIN 100 MG: 100 CAPSULE ORAL at 19:49

## 2022-07-16 RX ADMIN — SENNOSIDES AND DOCUSATE SODIUM 2 TABLET: 50; 8.6 TABLET ORAL at 19:50

## 2022-07-16 RX ADMIN — ACETAMINOPHEN 325MG 650 MG: 325 TABLET ORAL at 18:45

## 2022-07-16 RX ADMIN — DULOXETINE HYDROCHLORIDE 60 MG: 60 CAPSULE, DELAYED RELEASE ORAL at 08:32

## 2022-07-16 RX ADMIN — SENNOSIDES AND DOCUSATE SODIUM 2 TABLET: 50; 8.6 TABLET ORAL at 08:32

## 2022-07-16 RX ADMIN — FUROSEMIDE 40 MG: 10 INJECTION, SOLUTION INTRAMUSCULAR; INTRAVENOUS at 08:32

## 2022-07-16 RX ADMIN — ACETAMINOPHEN 325MG 650 MG: 325 TABLET ORAL at 02:03

## 2022-07-16 RX ADMIN — ASPIRIN 325 MG: 325 TABLET, COATED ORAL at 08:32

## 2022-07-16 RX ADMIN — QUETIAPINE FUMARATE 50 MG: 25 TABLET ORAL at 19:50

## 2022-07-16 RX ADMIN — TIZANIDINE 4 MG: 4 TABLET ORAL at 19:50

## 2022-07-16 RX ADMIN — BUSPIRONE HYDROCHLORIDE 10 MG: 10 TABLET ORAL at 08:32

## 2022-07-16 RX ADMIN — AMIODARONE HYDROCHLORIDE 200 MG: 200 TABLET ORAL at 00:15

## 2022-07-16 RX ADMIN — PANTOPRAZOLE SODIUM 40 MG: 40 TABLET, DELAYED RELEASE ORAL at 06:28

## 2022-07-16 RX ADMIN — METOPROLOL TARTRATE 50 MG: 50 TABLET, FILM COATED ORAL at 19:26

## 2022-07-16 RX ADMIN — ATORVASTATIN CALCIUM 40 MG: 40 TABLET, FILM COATED ORAL at 19:49

## 2022-07-16 RX ADMIN — METOPROLOL TARTRATE 25 MG: 25 TABLET, FILM COATED ORAL at 08:32

## 2022-07-16 RX ADMIN — HYDROCODONE BITARTRATE AND ACETAMINOPHEN 1 TABLET: 7.5; 325 TABLET ORAL at 06:31

## 2022-07-16 NOTE — PLAN OF CARE
Goal Outcome Evaluation:  NEURO: A&Ox 4. HINES =. Ambulated in hallway x2 with minimal assistance.       RESP: Sats >90% on 2 L NC.   MTs:  d/c'd this am    CARDIAC: Afib with rate anywhere from . -140. Palpable Pulses. Audible S1 & S2.    GI/:  Audible bowel sounds. No BM.    UOP: 1400ml over shift.

## 2022-07-16 NOTE — PROGRESS NOTES
" [facility] - Progress Note    Josh Horan  1960  S260/1    07/16/22, 09:53 EDT      Chief Complaint: Following for post op management of BP, arrhythmias    Subjective:   No chest pain shortness of breath or palpitations.      Review of Systems:  Pertinent positives are listed above and in physical exam.  All others have been reviewed and are negative.    acetaminophen, 650 mg, Oral, Q8H  amiodarone, 200 mg, Oral, Q8H   Followed by  [START ON 7/22/2022] amiodarone, 200 mg, Oral, Q12H   Followed by  [START ON 8/5/2022] amiodarone, 200 mg, Oral, Daily  aspirin, 325 mg, Oral, Daily  atorvastatin, 40 mg, Oral, Nightly  busPIRone, 10 mg, Oral, TID  DULoxetine, 60 mg, Oral, Daily  gabapentin, 100 mg, Oral, TID  metoprolol tartrate, 25 mg, Oral, Q12H  pantoprazole, 40 mg, Oral, Q AM  pharmacy consult - [place], , Does not apply, Daily  QUEtiapine, 50 mg, Oral, Nightly  senna-docusate sodium, 2 tablet, Oral, BID        Objective:  Vitals:   height is 170.2 cm (67\") and weight is 98 kg (216 lb). His oral temperature is 98 °F (36.7 °C). His blood pressure is 115/85 and his pulse is 120. His respiration is 14 and oxygen saturation is 91%.       Intake/Output Summary (Last 24 hours) at 7/16/2022 1101  Last data filed at 7/16/2022 0940  Gross per 24 hour   Intake 1148 ml   Output 4060 ml   Net -2912 ml       Physical Exam:  General:  WN, NAD, A and O x3.  CV:  Irregular, tachy. No murmur, rub, or gallop.  Resp:  CTA Tay, equal, nonlabored.  Abd:  Soft, + BS, no organomegaly. Nontender to palpation.  Extrem:  No edema BLE, 2+ pedal/PT pulses.            Results from last 7 days   Lab Units 07/16/22  0421   WBC 10*3/mm3 11.62*   HEMOGLOBIN g/dL 10.2*   HEMATOCRIT % 29.6*   PLATELETS 10*3/mm3 185     Results from last 7 days   Lab Units 07/16/22  0421   SODIUM mmol/L 139   POTASSIUM mmol/L 4.2   CHLORIDE mmol/L 102   CO2 mmol/L 23.0   BUN mg/dL 13   CREATININE mg/dL 0.74*   CALCIUM mg/dL 8.9   BILIRUBIN mg/dL " 0.8   ALK PHOS U/L 73   ALT (SGPT) U/L 8   AST (SGOT) U/L 13   GLUCOSE mg/dL 119*     Results from last 7 days   Lab Units 07/13/22  0208 07/12/22  1558 07/11/22  1209   INR  1.20* 1.44* 0.97     Results from last 7 days   Lab Units 07/14/22  0259   TSH uIU/mL 0.219*   FREE T4 ng/dL 1.91*               Tele:  Atrial  Fibrillation    Assessment and Plan:  1.  Essential  Hypertension              -  Controlled currently              -  Continue Lopressor 25mg BID ordered, titrate as necessary     2.  Hyperlipidemia              -  Continue high intensity statin, appropriate diet     3.  Coronary Artery Disease              -  Presents with 3 months progressive dyspnea and chest pain.  MV CAD by [facility].              -  EF 50-55% by echo 4/2022              -  POD 3 p CABG, Dr. Anton:  LIMA-LAD, SVG-RCA, SVG- DX of LAD, SVG- OM1-OM2.  REGINA ligation with 35 atrial clip.              -  Continue support.     4.  Hypothyroidism              -  Currently on no supplementation.              -  Labs are abnormal.  Per Intensivist    5.  Post Op Atrial Fibrillation with RVR   -  Amiodarone protocol initiated.  Re bolus today.   -  Continue to monitor.        60 yo CM with HTN, HLP, TAMIKO, hypothyroid, marijuana use, PTSD, Bipolar with progressive angina and dyspnea found to have MV CAD by [facility].  Now POD 4 after CABG, normal EF.  On BB/Amio per protocol for post op AFib.          Increase metoprolol to 50 mg twice daily    Roberto Sosa MD  07/16/22, 09:56 EDT

## 2022-07-16 NOTE — PROGRESS NOTES
CTS Progress Note       LOS: 4 days   Patient Care Team:  Jerrica Madrid APRN as PCP - General (Family Medicine)    Chief Complaint: Coronary artery disease of native artery of native heart with stable angina pectoris (HCC)    Vital Signs:  Temp:  [97.8 °F (36.6 °C)-99.3 °F (37.4 °C)] 99.1 °F (37.3 °C)  Heart Rate:  [] 131  Resp:  [15-26] 18  BP: ()/() 116/83    Physical Exam:       Results:   Results from last 7 days   Lab Units 07/16/22  0421   WBC 10*3/mm3 11.62*   HEMOGLOBIN g/dL 10.2*   HEMATOCRIT % 29.6*   PLATELETS 10*3/mm3 185     Results from last 7 days   Lab Units 07/16/22  0421   SODIUM mmol/L 139   POTASSIUM mmol/L 4.2   CHLORIDE mmol/L 102   CO2 mmol/L 23.0   BUN mg/dL 13   CREATININE mg/dL 0.74*   GLUCOSE mg/dL 119*   CALCIUM mg/dL 8.9           Imaging Results (Last 24 Hours)     Procedure Component Value Units Date/Time    XR Chest 1 View [[id number]] Resulted: 07/16/22 0600     Updated: 07/16/22 0601    XR Chest 1 View [[id number]] Collected: 07/15/22 0822     Updated: 07/15/22 0827    Narrative:         DATE OF EXAM:   7/15/2022 4:34 AM     PROCEDURE:   XR CHEST 1 VW-     INDICATIONS:   f/u; I25.118-Atherosclerotic heart disease of native coronary artery  with other forms of angina pectoris     COMPARISON:  7/14/2022 at 4:49 AM     TECHNIQUE:   [Portable chest radiograph]     FINDINGS:   The left chest tube is stable in position. The mediastinal drains are  stable in position. Postoperative changes are noted. A right internal  jugular venous sheath and catheter are stable in position. Residual  atelectasis is noted in the lung bases, more pronounced on the left.  There may be a small residual left pleural effusion.        Impression:      1.  Stable positioning of the left chest tube and mediastinal drains.  2.  The right internal jugular venous sheath and catheter are stable.  3.  Residual atelectasis within the lung bases. There is also a small  residual left pleural effusion.      This report was finalized on 7/15/2022 8:23 AM by Kamran Baker MD.             Assessment      Coronary artery disease of native artery of native heart with stable angina pectoris (HCC)    Hypertension    Hyperlipidemia    Anxiety    Acid reflux    TAMIKO (obstructive sleep apnea)    Depression    Bipolar 1 disorder (HCC)    PTSD (post-traumatic stress disorder)    Marijuana use    Hypothyroidism        Plan   Discontinue chest tubes and pacer wires    Please note that portions of this note were completed with a voice recognition program. Efforts were made to edit the dictations, but occasionally words are mistranscribed.    Baldomero Anton MD  07/16/22  08:03 EDT

## 2022-07-16 NOTE — PROGRESS NOTES
Intensive Care Follow-up     Hospital:  LOS: 4 days   Mr. Josh Horan, 61 y.o. male is followed for:   Coronary artery disease of native artery of native heart with stable angina pectoris (HCC)   Followed postoperatively for medical needs     Subjective   Interval History:  The chart has been reviewed.  The patient has remained hemodynamically stable through the night.  He has not required further pressors.  He does complain of some soreness around his incisions.  Otherwise is feeling well.  He does remain in what appears to be atrial fibrillation.    The patient's past medical, surgical and social history were reviewed and updated in Epic as appropriate.        Objective     Infusions:  dextrose 5 % with KCl 20 mEq, 30 mL/hr, Last Rate: 30 mL/hr (07/13/22 0600)  niCARdipine, 5-15 mg/hr  norepinephrine, 0.02-0.3 mcg/kg/min, Last Rate: Stopped (07/15/22 1030)  phenylephrine, 0.5-3 mcg/kg/min      Medications:  acetaminophen, 650 mg, Oral, Q8H  amiodarone, 200 mg, Oral, Q8H   Followed by  [START ON 7/22/2022] amiodarone, 200 mg, Oral, Q12H   Followed by  [START ON 8/5/2022] amiodarone, 200 mg, Oral, Daily  aspirin, 325 mg, Oral, Daily  atorvastatin, 40 mg, Oral, Nightly  busPIRone, 10 mg, Oral, TID  DULoxetine, 60 mg, Oral, Daily  gabapentin, 100 mg, Oral, TID  metoprolol tartrate, 50 mg, Oral, Q12H  pantoprazole, 40 mg, Oral, Q AM  pharmacy consult - [place], , Does not apply, Daily  QUEtiapine, 50 mg, Oral, Nightly  senna-docusate sodium, 2 tablet, Oral, BID        Vital Sign Min/Max for last 24 hours  Temp  Min: 97.8 °F (36.6 °C)  Max: 99.1 °F (37.3 °C)   BP  Min: 89/62  Max: 140/103   Pulse  Min: 62  Max: 142   Resp  Min: 14  Max: 26   SpO2  Min: 89 %  Max: 99 %   Flow (L/min)  Min: 1  Max: 2       Input/Output for last 24 hour shift  07/15 0701 - 07/16 0700  In: 1299 [P.O.:360; I.V.:439]  Out: 3780 [Urine:3450]      Objective:  General Appearance:  Uncomfortable, well-appearing and in no acute distress.    Vital  "signs: (most recent): Blood pressure 125/89, pulse 118, temperature 98 °F (36.7 °C), temperature source Oral, resp. rate 14, height 170.2 cm (67\"), weight 98 kg (216 lb), SpO2 91 %.    HEENT: Normal HEENT exam.    Lungs:  Normal effort.    Heart: Tachycardia.  Irregular rhythm.  S1 normal and S2 normal.  No murmur.   Chest: Symmetric chest wall expansion. (Status post median sternotomy.  Wound is dry and clean.)  Abdomen: Abdomen is soft and non-distended.  Bowel sounds are normal.     Extremities: Normal range of motion.  There is no dependent edema.    Neurological: Patient is alert and oriented to person, place and time.    Pupils:  Pupils are equal, round, and reactive to light.              Results from last 7 days   Lab Units 07/16/22  0421 07/15/22  0118 07/14/22  0259   WBC 10*3/mm3 11.62* 13.13* 12.37*   HEMOGLOBIN g/dL 10.2* 10.2* 9.7*   PLATELETS 10*3/mm3 185 151 141     Results from last 7 days   Lab Units 07/16/22  0421 07/15/22  0118 07/14/22  1533 07/14/22  0259   SODIUM mmol/L 139 136  --  138   POTASSIUM mmol/L 4.2 4.2  --  3.9   CO2 mmol/L 23.0 26.0  --  28.0   BUN mg/dL 13 10  --  10   CREATININE mg/dL 0.74* 0.77  --  0.68*   MAGNESIUM mg/dL 2.1 2.1  --  2.5   PHOSPHORUS mg/dL 2.2* 2.7 2.2* 2.4*   GLUCOSE mg/dL 119* 139*  --  114*     Estimated Creatinine Clearance: 119.9 mL/min (A) (by C-G formula based on SCr of 0.74 mg/dL (L)).    Results from last 7 days   Lab Units 07/13/22  0106   PH, ARTERIAL pH units 7.392   PCO2, ARTERIAL mm Hg 40.1   PO2 ART mm Hg 111.0*         I reviewed the patient's results and images.     Assessment & Plan   Impression        Coronary artery disease of native artery of native heart with stable angina pectoris (HCC)    Hypertension    Hyperlipidemia    Anxiety    Acid reflux    TAMIKO (obstructive sleep apnea)    Depression    Bipolar 1 disorder (HCC)    PTSD (post-traumatic stress disorder)    Marijuana use    Hypothyroidism       Plan        Mobilize as " tolerated.  Pulmonary hygiene has been encouraged.  Continue with current glucose control.  Rhythm control per cardiology.  Follow-up labs and orders have been placed for tomorrow morning.    Plan of care and goals reviewed with mulitdisciplinary/antibiotic stewardship team during rounds.   I discussed the patient's findings and my recommendations with patient and nursing staff       Garth Berry MD, [facility]  Pulmonology and Critical Care Medicine

## 2022-07-17 LAB
ALBUMIN SERPL-MCNC: 3.4 G/DL (ref 3.5–5.2)
ANION GAP SERPL CALCULATED.3IONS-SCNC: 12 MMOL/L (ref 5–15)
BUN SERPL-MCNC: 17 MG/DL (ref 6–20)
BUN/CREAT SERPL: 21.5 (ref 7–25)
CALCIUM SPEC-SCNC: 9 MG/DL (ref 8.6–10.5)
CHLORIDE SERPL-SCNC: 102 MMOL/L (ref 98–107)
CO2 SERPL-SCNC: 26 MMOL/L (ref 22–29)
CREAT SERPL-MCNC: 0.79 MG/DL (ref 0.76–1.27)
DEPRECATED RDW RBC AUTO: 41.2 FL (ref 37–54)
EGFRCR SERPLBLD CKD-EPI 2021: 102.3 ML/MIN/1.73
ERYTHROCYTE [DISTWIDTH] IN BLOOD BY AUTOMATED COUNT: 12.5 % (ref 12.3–15.4)
GLUCOSE SERPL-MCNC: 109 MG/DL (ref 65–99)
HCT VFR BLD AUTO: 32 % (ref 37.5–51)
HGB BLD-MCNC: 10.8 G/DL (ref 13–17.7)
MCH RBC QN AUTO: 30.3 PG (ref 26.6–33)
MCHC RBC AUTO-ENTMCNC: 33.8 G/DL (ref 31.5–35.7)
MCV RBC AUTO: 89.6 FL (ref 79–97)
PHOSPHATE SERPL-MCNC: 3.2 MG/DL (ref 2.5–4.5)
PLATELET # BLD AUTO: 231 10*3/MM3 (ref 140–450)
PMV BLD AUTO: 9.9 FL (ref 6–12)
POTASSIUM SERPL-SCNC: 3.6 MMOL/L (ref 3.5–5.2)
RBC # BLD AUTO: 3.57 10*6/MM3 (ref 4.14–5.8)
SODIUM SERPL-SCNC: 140 MMOL/L (ref 136–145)
WBC NRBC COR # BLD: 10.53 10*3/MM3 (ref 3.4–10.8)

## 2022-07-17 PROCEDURE — 93010 ELECTROCARDIOGRAM REPORT: CPT | Performed by: INTERNAL MEDICINE

## 2022-07-17 PROCEDURE — 99024 POSTOP FOLLOW-UP VISIT: CPT | Performed by: THORACIC SURGERY (CARDIOTHORACIC VASCULAR SURGERY)

## 2022-07-17 PROCEDURE — 93005 ELECTROCARDIOGRAM TRACING: CPT | Performed by: NURSE PRACTITIONER

## 2022-07-17 PROCEDURE — 25010000002 HEPARIN (PORCINE) PER 1000 UNITS: Performed by: INTERNAL MEDICINE

## 2022-07-17 PROCEDURE — 85027 COMPLETE CBC AUTOMATED: CPT | Performed by: THORACIC SURGERY (CARDIOTHORACIC VASCULAR SURGERY)

## 2022-07-17 PROCEDURE — 99232 SBSQ HOSP IP/OBS MODERATE 35: CPT | Performed by: INTERNAL MEDICINE

## 2022-07-17 PROCEDURE — 97530 THERAPEUTIC ACTIVITIES: CPT

## 2022-07-17 PROCEDURE — 97110 THERAPEUTIC EXERCISES: CPT

## 2022-07-17 PROCEDURE — 80069 RENAL FUNCTION PANEL: CPT | Performed by: THORACIC SURGERY (CARDIOTHORACIC VASCULAR SURGERY)

## 2022-07-17 RX ORDER — GUAIFENESIN 600 MG/1
600 TABLET, EXTENDED RELEASE ORAL EVERY 12 HOURS SCHEDULED
Status: DISCONTINUED | OUTPATIENT
Start: 2022-07-17 | End: 2022-07-20 | Stop reason: HOSPADM

## 2022-07-17 RX ORDER — HEPARIN SODIUM 5000 [USP'U]/ML
5000 INJECTION, SOLUTION INTRAVENOUS; SUBCUTANEOUS EVERY 8 HOURS SCHEDULED
Status: DISCONTINUED | OUTPATIENT
Start: 2022-07-17 | End: 2022-07-20 | Stop reason: HOSPADM

## 2022-07-17 RX ADMIN — AMIODARONE HYDROCHLORIDE 200 MG: 200 TABLET ORAL at 09:00

## 2022-07-17 RX ADMIN — HYDROCODONE BITARTRATE AND ACETAMINOPHEN 1 TABLET: 7.5; 325 TABLET ORAL at 04:15

## 2022-07-17 RX ADMIN — ACETAMINOPHEN 325MG 650 MG: 325 TABLET ORAL at 17:54

## 2022-07-17 RX ADMIN — PANTOPRAZOLE SODIUM 40 MG: 40 TABLET, DELAYED RELEASE ORAL at 06:13

## 2022-07-17 RX ADMIN — ACETAMINOPHEN 325MG 650 MG: 325 TABLET ORAL at 09:00

## 2022-07-17 RX ADMIN — AMIODARONE HYDROCHLORIDE 200 MG: 200 TABLET ORAL at 00:03

## 2022-07-17 RX ADMIN — GUAIFENESIN 600 MG: 600 TABLET, EXTENDED RELEASE ORAL at 21:26

## 2022-07-17 RX ADMIN — HEPARIN SODIUM 5000 UNITS: 5000 INJECTION INTRAVENOUS; SUBCUTANEOUS at 21:21

## 2022-07-17 RX ADMIN — GABAPENTIN 100 MG: 100 CAPSULE ORAL at 08:59

## 2022-07-17 RX ADMIN — SENNOSIDES AND DOCUSATE SODIUM 2 TABLET: 50; 8.6 TABLET ORAL at 08:59

## 2022-07-17 RX ADMIN — BUSPIRONE HYDROCHLORIDE 10 MG: 10 TABLET ORAL at 16:30

## 2022-07-17 RX ADMIN — POTASSIUM CHLORIDE 40 MEQ: 750 CAPSULE, EXTENDED RELEASE ORAL at 06:13

## 2022-07-17 RX ADMIN — QUETIAPINE FUMARATE 50 MG: 25 TABLET ORAL at 21:21

## 2022-07-17 RX ADMIN — ATORVASTATIN CALCIUM 40 MG: 40 TABLET, FILM COATED ORAL at 21:21

## 2022-07-17 RX ADMIN — GUAIFENESIN 600 MG: 600 TABLET, EXTENDED RELEASE ORAL at 16:30

## 2022-07-17 RX ADMIN — ASPIRIN 325 MG: 325 TABLET, COATED ORAL at 09:05

## 2022-07-17 RX ADMIN — HEPARIN SODIUM 5000 UNITS: 5000 INJECTION INTRAVENOUS; SUBCUTANEOUS at 16:31

## 2022-07-17 RX ADMIN — ACETAMINOPHEN 325MG 650 MG: 325 TABLET ORAL at 02:12

## 2022-07-17 RX ADMIN — SENNOSIDES AND DOCUSATE SODIUM 2 TABLET: 50; 8.6 TABLET ORAL at 21:21

## 2022-07-17 RX ADMIN — AMIODARONE HYDROCHLORIDE 200 MG: 200 TABLET ORAL at 16:31

## 2022-07-17 RX ADMIN — GABAPENTIN 100 MG: 100 CAPSULE ORAL at 16:31

## 2022-07-17 RX ADMIN — METOPROLOL TARTRATE 50 MG: 50 TABLET, FILM COATED ORAL at 21:21

## 2022-07-17 RX ADMIN — DULOXETINE HYDROCHLORIDE 60 MG: 60 CAPSULE, DELAYED RELEASE ORAL at 08:59

## 2022-07-17 RX ADMIN — BUSPIRONE HYDROCHLORIDE 10 MG: 10 TABLET ORAL at 10:53

## 2022-07-17 RX ADMIN — POTASSIUM CHLORIDE 40 MEQ: 750 CAPSULE, EXTENDED RELEASE ORAL at 11:28

## 2022-07-17 RX ADMIN — BUSPIRONE HYDROCHLORIDE 10 MG: 10 TABLET ORAL at 21:21

## 2022-07-17 RX ADMIN — METOPROLOL TARTRATE 50 MG: 50 TABLET, FILM COATED ORAL at 09:00

## 2022-07-17 RX ADMIN — GABAPENTIN 100 MG: 100 CAPSULE ORAL at 21:21

## 2022-07-17 NOTE — PROGRESS NOTES
Intensive Care Follow-up     Hospital:  LOS: 5 days   Mr. Josh Horan, 61 y.o. male is followed for:   Coronary artery disease of native artery of native heart with stable angina pectoris (HCC)   Followed postoperatively for medical needs     Subjective   Interval History:  The chart has been reviewed.  The patient has remained afebrile.  Support lines and tubes have been removed.  Patient does remain in atrial fibrillation though is better rate controlled currently.    The patient's past medical, surgical and social history were reviewed and updated in Epic as appropriate.        Objective     Infusions:  dextrose 5 % with KCl 20 mEq, 30 mL/hr, Last Rate: 30 mL/hr (07/13/22 0600)  niCARdipine, 5-15 mg/hr  norepinephrine, 0.02-0.3 mcg/kg/min, Last Rate: Stopped (07/15/22 1030)  phenylephrine, 0.5-3 mcg/kg/min      Medications:  acetaminophen, 650 mg, Oral, Q8H  amiodarone, 200 mg, Oral, Q8H   Followed by  [START ON 7/22/2022] amiodarone, 200 mg, Oral, Q12H   Followed by  [START ON 8/5/2022] amiodarone, 200 mg, Oral, Daily  aspirin, 325 mg, Oral, Daily  atorvastatin, 40 mg, Oral, Nightly  busPIRone, 10 mg, Oral, TID  DULoxetine, 60 mg, Oral, Daily  gabapentin, 100 mg, Oral, TID  guaiFENesin, 600 mg, Oral, Q12H  heparin (porcine), 5,000 Units, Subcutaneous, Q8H  metoprolol tartrate, 50 mg, Oral, Q12H  pantoprazole, 40 mg, Oral, Q AM  pharmacy consult - [facility], , Does not apply, Daily  QUEtiapine, 50 mg, Oral, Nightly  senna-docusate sodium, 2 tablet, Oral, BID        Vital Sign Min/Max for last 24 hours  Temp  Min: 96.7 °F (35.9 °C)  Max: 98.7 °F (37.1 °C)   BP  Min: 89/49  Max: 144/88   Pulse  Min: 67  Max: 170   Resp  Min: 15  Max: 16   SpO2  Min: 86 %  Max: 99 %   Flow (L/min)  Min: 2  Max: 2       Input/Output for last 24 hour shift  07/16 0701 - 07/17 0700  In: -   Out: 2350 [Urine:2350]      Objective:  General Appearance:  Well-appearing, in no acute distress and comfortable.    Vital signs: (most recent): Blood  "pressure 133/86, pulse 74, temperature 97.8 °F (36.6 °C), temperature source Axillary, resp. rate 16, height 170.2 cm (67\"), weight 98 kg (216 lb), SpO2 93 %.    HEENT: Normal HEENT exam.    Lungs:  Normal effort.    Heart: Normal rate.  Irregular rhythm.  S1 normal and S2 normal.  No murmur.   Chest: Symmetric chest wall expansion. (Status post median sternotomy.  Wound is dry and clean.)  Abdomen: Abdomen is soft and non-distended.  Bowel sounds are normal.     Extremities: Normal range of motion.  There is no dependent edema.    Neurological: Patient is alert and oriented to person, place and time.    Pupils:  Pupils are equal, round, and reactive to light.              Results from last 7 days   Lab Units 07/17/22  0402 07/16/22  0421 07/15/22  0118   WBC 10*3/mm3 10.53 11.62* 13.13*   HEMOGLOBIN g/dL 10.8* 10.2* 10.2*   PLATELETS 10*3/mm3 231 185 151     Results from last 7 days   Lab Units 07/17/22  0402 07/16/22  0421 07/15/22  0118 07/14/22  1533 07/14/22  0259   SODIUM mmol/L 140 139 136  --  138   POTASSIUM mmol/L 3.6 4.2 4.2  --  3.9   CO2 mmol/L 26.0 23.0 26.0  --  28.0   BUN mg/dL 17 13 10  --  10   CREATININE mg/dL 0.79 0.74* 0.77  --  0.68*   MAGNESIUM mg/dL  --  2.1 2.1  --  2.5   PHOSPHORUS mg/dL 3.2 2.2* 2.7   < > 2.4*   GLUCOSE mg/dL 109* 119* 139*  --  114*    < > = values in this interval not displayed.     Estimated Creatinine Clearance: 112.4 mL/min (by C-G formula based on SCr of 0.79 mg/dL).    Results from last 7 days   Lab Units 07/13/22  0106   PH, ARTERIAL pH units 7.392   PCO2, ARTERIAL mm Hg 40.1   PO2 ART mm Hg 111.0*         I reviewed the patient's results and images.     Assessment & Plan   Impression        Coronary artery disease of native artery of native heart with stable angina pectoris (HCC)    Hypertension    Hyperlipidemia    Anxiety    Acid reflux    TAMIKO (obstructive sleep apnea)    Depression    Bipolar 1 disorder (HCC)    PTSD (post-traumatic stress disorder)    " Marijuana use    Hypothyroidism       Plan        Start subcutaneous DVT prophylaxis.  Pulmonary hygiene has been encouraged.  Begin Mucinex scheduled.  Mobilize as tolerated.  Continue with antiarrhythmics.  Orders have been placed.    Plan of care and goals reviewed with mulitdisciplinary/antibiotic stewardship team during rounds.   I discussed the patient's findings and my recommendations with patient and nursing staff         Garth Berry MD, [facility] [place]  Pulmonology and Critical Care Medicine

## 2022-07-17 NOTE — PLAN OF CARE
Goal Outcome Evaluation:  Plan of Care Reviewed With: patient        Progress: improving  Outcome Evaluation: Pt demonstrated increased indep with static standing balance and progressed forward ambulation distance to 440 ft with CGA, maintaining stable O2 sats on RA. Motivated to participate and progress with seated ther ex and gait. Will cont PT POC.

## 2022-07-17 NOTE — PROGRESS NOTES
CTS Progress Note       LOS: 5 days   Patient Care Team:  Jerrica Madrid APRN as PCP - General (Family Medicine)    Chief Complaint: Coronary artery disease of native artery of native heart with stable angina pectoris (HCC)    Vital Signs:  Temp:  [96.7 °F (35.9 °C)-98.7 °F (37.1 °C)] 98.7 °F (37.1 °C)  Heart Rate:  [] 142  Resp:  [14-20] 15  BP: ()/() 140/91    Physical Exam: Alert conversant       Results:   Results from last 7 days   Lab Units 07/17/22  0402   WBC 10*3/mm3 10.53   HEMOGLOBIN g/dL 10.8*   HEMATOCRIT % 32.0*   PLATELETS 10*3/mm3 231     Results from last 7 days   Lab Units 07/17/22  0402   SODIUM mmol/L 140   POTASSIUM mmol/L 3.6   CHLORIDE mmol/L 102   CO2 mmol/L 26.0   BUN mg/dL 17   CREATININE mg/dL 0.79   GLUCOSE mg/dL 109*   CALCIUM mg/dL 9.0           Imaging Results (Last 24 Hours)     Procedure Component Value Units Date/Time    XR Chest 1 View [[id number]] Collected: 07/16/22 0828     Updated: 07/16/22 0832    Narrative:         DATE OF EXAM:   7/16/2022 4:50 AM     PROCEDURE:   XR CHEST 1 VW-     INDICATIONS:   f/u; I25.118-Atherosclerotic heart disease of native coronary artery  with other forms of angina pectoris     COMPARISON:  07/15/2022     TECHNIQUE:   Portable chest radiograph.     FINDINGS:    There is a stable right IJ central venous catheter with the tip at the  mid SVC. Mediastinal and left-sided chest tubes noted. No pneumothorax.  Stable cardiomegaly. Postsurgical changes of sternotomy. There is a left  atrial appendage clip noted. Persistent bibasilar airspace disease and  small bilateral pleural effusions.       Impression:      1. Stable lines and support tubes.  2. No pneumothorax.  3. Stable bibasilar airspace disease likely atelectasis with small  bilateral effusions.     This report was finalized on 7/16/2022 8:29 AM by Antonio Woo MD.             Assessment      Coronary artery disease of native artery of native heart with stable angina pectoris  (HCC)    Hypertension    Hyperlipidemia    Anxiety    Acid reflux    TAMIKO (obstructive sleep apnea)    Depression    Bipolar 1 disorder (HCC)    PTSD (post-traumatic stress disorder)    Marijuana use    Hypothyroidism    This morning continues with atrial fibrillation but rate 1 30-1 50.  Will defer to cardiology but may need an additional bolus of amiodarone  Please note I did ligate this patient's atrial appendage at time of surgery  Plan   As above    Please note that portions of this note were completed with a voice recognition program. Efforts were made to edit the dictations, but occasionally words are mistranscribed.    Baldomero Anton MD  07/17/22  07:23 EDT

## 2022-07-17 NOTE — THERAPY TREATMENT NOTE
Patient Name: Josh Horan  : 1960    MRN: [id number]                              Today's Date: 2022       Admit Date: 2022    Visit Dx:     ICD-10-CM ICD-9-CM   1. Coronary artery disease of native artery of native heart with stable angina pectoris (HCC)  I25.118 414.01     413.9     Patient Active Problem List   Diagnosis   • Coronary artery disease of native artery of native heart with stable angina pectoris (HCC)   • Abnormal findings on diagnostic imaging of heart and coronary circulation   • Hypertension   • Hyperlipidemia   • Anxiety   • Acid reflux   • TAMIKO (obstructive sleep apnea)   • Depression   • Bipolar 1 disorder (HCC)   • PTSD (post-traumatic stress disorder)   • Marijuana use   • Hypothyroidism     Past Medical History:   Diagnosis Date   • Acid reflux    • Anxiety    • Arthritis    • Back pain    • Bipolar 1 disorder (HCC)    • Coronary artery disease    • Depression    • Headache    • Hiatal hernia    • Hyperlipidemia    • Hypertension    • Hyperthyroidism    • Hypothyroidism    • Lyme disease    • Migraine    • MRSA infection     1.5-2 years ago- back   • Panic attacks    • PTSD (post-traumatic stress disorder)    • Sciatic nerve pain    • Seasonal allergies    • Sleep apnea     doesnt use machine   • Tinnitus    • Wears glasses      Past Surgical History:   Procedure Laterality Date   • CARDIAC CATHETERIZATION      2022 Dr. Salguero   • CORONARY ARTERY BYPASS GRAFT N/A 2022    Procedure: MEDIAN STERNOTOMY CORONARY ARTERY BYPASS GRAFTING X5 , UTILIZING THE LEFT INTERNAL MAMMERY GRAFT, ENDOSCOPIC VEIN HARVEST OF THE GREATER RIGHT SAPHENOUS VEIN WITH EXPLORATION OF THE LEFT LEG;  Surgeon: Baldomero Anton MD;  Location: [facility];  Service: Cardiothoracic;  Laterality: N/A;   • HERNIA REPAIR Left     umbilical hernia repair - pt unsure about mesh   • OTHER SURGICAL HISTORY      Lymph node removal neck   • VASECTOMY        General Information     Row Name 22 1358           Physical Therapy Time and Intention    Document Type therapy note (daily note)  -     Mode of Treatment physical therapy  -     Row Name 07/17/22 1358          General Information    Existing Precautions/Restrictions cardiac;fall;sternal  -     Row Name 07/17/22 1358          Cognition    Orientation Status (Cognition) oriented x 4  -           User Key  (r) = Recorded By, (t) = Taken By, (c) = Cosigned By    Initials Name Provider Type     Diana Chappell, PT Physical Therapist               Mobility     Row Name 07/17/22 1359          Bed Mobility    Comment, (Bed Mobility) UIC  -     Row Name 07/17/22 1359          Sit-Stand Transfer    Sit-Stand [place] (Transfers) contact guard;verbal cues  -     Row Name 07/17/22 1359          Gait/Stairs (Locomotion)    [place] Level (Gait) contact guard;verbal cues  -     Distance in Feet (Gait) 440  -LS     Deviations/Abnormal Patterns (Gait) duke decreased  -LS     Comment, (Gait/Stairs) VC for upright posture; good safety awareness.  -           User Key  (r) = Recorded By, (t) = Taken By, (c) = Cosigned By    Initials Name Provider Type     Diana Chappell, PT Physical Therapist               Obj/Interventions     Row Name 07/17/22 1359          Motor Skills    Therapeutic Exercise knee;ankle  -     Row Name 07/17/22 1359          Knee (Therapeutic Exercise)    Knee (Therapeutic Exercise) strengthening exercise  -     Knee Strengthening (Therapeutic Exercise) bilateral;marching while seated;LAQ (long arc quad);sitting;10 repetitions  -     Row Name 07/17/22 1359          Ankle (Therapeutic Exercise)    Ankle (Therapeutic Exercise) AROM (active range of motion)  -     Ankle AROM (Therapeutic Exercise) bilateral;dorsiflexion;plantarflexion;10 repetitions;sitting  -     Row Name 07/17/22 1356          Balance    Static Sitting Balance independent  -     Position, Sitting Balance supported;sitting in chair  -     Static  Standing Balance supervision  -LS     Position/Device Used, Standing Balance unsupported  -LS           User Key  (r) = Recorded By, (t) = Taken By, (c) = Cosigned By    Initials Name Provider Type    Diana Hutchins, PT Physical Therapist               Goals/Plan    No documentation.                Clinical Impression     Row Name 07/17/22 1400          Pain    Pretreatment Pain Rating 2/10  -LS     Posttreatment Pain Rating 2/10  -LS     Pain Location incisional  -LS     Pain Location - chest  -LS     Pre/Posttreatment Pain Comment tolerated  -LS     Pain Intervention(s) Repositioned;Ambulation/increased activity  -LS     Row Name 07/17/22 1400          Plan of Care Review    Plan of Care Reviewed With patient  -LS     Progress improving  -LS     Outcome Evaluation Pt demonstrated increased indep with static standing balance and progressed forward ambulation distance to 440 ft with CGA, maintaining stable O2 sats on RA. Motivated to participate and progress with seated ther ex and gait. Will cont PT POC.  -LS     Row Name 07/17/22 1400          Vital Signs    Pre Systolic BP Rehab 139  -LS     Pre Treatment Diastolic BP 89  -LS     Pretreatment Heart Rate (beats/min) 120  irregular; RN aware  -LS     Posttreatment Heart Rate (beats/min) 86  -LS     O2 Delivery Pre Treatment room air  -LS     O2 Delivery Intra Treatment room air  -LS     Post SpO2 (%) 92  -LS     O2 Delivery Post Treatment room air  -LS     Pre Patient Position Sitting  -LS     Intra Patient Position Standing  -LS     Post Patient Position Sitting  -LS     Row Name 07/17/22 1400          Positioning and Restraints    Pre-Treatment Position sitting in chair/recliner  -LS     Post Treatment Position chair  -LS     In Chair notified nsg;reclined;call light within reach;encouraged to call for assist;waffle cushion;legs elevated  -LS           User Key  (r) = Recorded By, (t) = Taken By, (c) = Cosigned By    Initials Name Provider Type    CASSANDRA Chappell  Diana GASCA, PT Physical Therapist               Outcome Measures     Row Name 07/17/22 1402 07/17/22 0800       How much help from another person do you currently need...    Turning from your back to your side while in flat bed without using bedrails? 4  -LS 3  -OD    Moving from lying on back to sitting on the side of a flat bed without bedrails? 3  -LS 3  -OD    Moving to and from a bed to a chair (including a wheelchair)? 3  -LS 3  -OD    Standing up from a chair using your arms (e.g., wheelchair, bedside chair)? 3  -LS 3  -OD    Climbing 3-5 steps with a railing? 3  -LS 3  -OD    To walk in hospital room? 3  -LS 3  -OD    AM-PAC 6 Clicks Score (PT) 19  -LS 18  -OD    Highest level of mobility 6 --> Walked 10 steps or more  -LS 6 --> Walked 10 steps or more  -OD          User Key  (r) = Recorded By, (t) = Taken By, (c) = Cosigned By    Initials Name Provider Type    Diana Hutchins, PT Physical Therapist    Nida Torres RNA Registered Nurse                             Physical Therapy Education                 Title: PT OT SLP Therapies (Done)     Topic: Physical Therapy (Done)     Point: Mobility training (Done)     Learning Progress Summary           Patient Acceptance, E,D, VU,DU by CASSANDRA at 7/17/2022 1403    Acceptance, E, VU,DU by MOON at 7/15/2022 1106    Acceptance, E, NR by SILVIO at 7/14/2022 0915    Acceptance, E, NR by KG at 7/13/2022 1007                   Point: Home exercise program (Done)     Learning Progress Summary           Patient Acceptance, E,D, VU,DU by CASSANDRA at 7/17/2022 1403    Acceptance, E, VU,DU by WELO at 7/15/2022 1106    Acceptance, E, NR by SILVIO at 7/14/2022 0915    Acceptance, E, NR by KG at 7/13/2022 1007                   Point: Body mechanics (Done)     Learning Progress Summary           Patient Acceptance, E,D, VU,DU by CASSANDRA at 7/17/2022 1403    Acceptance, E, VU,DU by WELO at 7/15/2022 1106    Acceptance, E, NR by SILVIO at 7/14/2022 0915    Acceptance, E, NR by KG at 7/13/2022 1007                    Point: Precautions (Done)     Learning Progress Summary           Patient Acceptance, E,D, VU,DU by LS at 7/17/2022 1403    Acceptance, E, VU,DU by WJ at 7/15/2022 1106    Acceptance, E, NR by SILVIO at 7/14/2022 0915    Acceptance, E, NR by KG at 7/13/2022 1007                               User Key     Initials Effective Dates Name Provider Type Discipline    SILVIO 06/16/21 -  Giselle Darnell, PT Physical Therapist PT    LS 06/16/21 -  Diana Chappell, PT Physical Therapist PT    KG 05/22/20 -  Gabriella Pelaez, PT Physical Therapist PT    WJ 05/31/22 -  Panda Blum, PT Student PT Student PT              PT Recommendation and Plan     Plan of Care Reviewed With: patient  Progress: improving  Outcome Evaluation: Pt demonstrated increased indep with static standing balance and progressed forward ambulation distance to 440 ft with CGA, maintaining stable O2 sats on RA. Motivated to participate and progress with seated ther ex and gait. Will cont PT POC.     Time Calculation:    PT Charges     Row Name 07/17/22 1403             Time Calculation    Start Time 1035  -LS      PT Received On 07/17/22  -LS              Timed Charges    22646 - PT Therapeutic Exercise Minutes 4  -LS      65704 - PT Therapeutic Activity Minutes 19  -LS              Total Minutes    Timed Charges Total Minutes 23  -LS       Total Minutes 23  -LS            User Key  (r) = Recorded By, (t) = Taken By, (c) = Cosigned By    Initials Name Provider Type     Diana Chappell, PT Physical Therapist              Therapy Charges for Today     Code Description Service Date Service Provider Modifiers Qty    03220079614 HC PT THER PROC EA 15 MIN 7/17/2022 Diana Chappell, PT GP 1    55901712561 HC PT THERAPEUTIC ACT EA 15 MIN 7/17/2022 Diana Chappell, PT GP 1          PT G-Codes  Outcome Measure Options: AM-PAC 6 Clicks Basic Mobility (PT)  AM-PAC 6 Clicks Score (PT): 19    Diana Chappell, PT  7/17/2022

## 2022-07-17 NOTE — PROGRESS NOTES
" [facility] - Progress Note    Josh Horan  1960  [id number]    07/17/22, 11:38 EDT      Chief Complaint: Following for post op management of BP, arrhythmias    Subjective:   No chest pain shortness of breath or palpitations.  Remains in Afib    Review of Systems:  Pertinent positives are listed above and in physical exam.  All others have been reviewed and are negative.    acetaminophen, 650 mg, Oral, Q8H  amiodarone, 200 mg, Oral, Q8H   Followed by  [START ON 7/22/2022] amiodarone, 200 mg, Oral, Q12H   Followed by  [START ON 8/5/2022] amiodarone, 200 mg, Oral, Daily  aspirin, 325 mg, Oral, Daily  atorvastatin, 40 mg, Oral, Nightly  busPIRone, 10 mg, Oral, TID  DULoxetine, 60 mg, Oral, Daily  gabapentin, 100 mg, Oral, TID  metoprolol tartrate, 50 mg, Oral, Q12H  pantoprazole, 40 mg, Oral, Q AM  pharmacy consult - [place], , Does not apply, Daily  QUEtiapine, 50 mg, Oral, Nightly  senna-docusate sodium, 2 tablet, Oral, BID        Objective:  Vitals:   height is 170.2 cm (67\") and weight is 98 kg (216 lb). His axillary temperature is 98 °F (36.7 °C). His blood pressure is 139/89 and his pulse is 90. His respiration is 16 and oxygen saturation is 86% (abnormal).       Intake/Output Summary (Last 24 hours) at 7/17/2022 1137  Last data filed at 7/17/2022 1100  Gross per 24 hour   Intake 240 ml   Output 1700 ml   Net -1460 ml       Physical Exam:  General:  WN, NAD, A and O x3.  CV:  Irregular, tachy. No murmur, rub, or gallop.  Resp:  CTA Tay, equal, nonlabored.  Abd:  Soft, + BS, no organomegaly. Nontender to palpation.  Extrem:  No edema BLE, 2+ pedal/PT pulses.            Results from last 7 days   Lab Units 07/17/22  0402   WBC 10*3/mm3 10.53   HEMOGLOBIN g/dL 10.8*   HEMATOCRIT % 32.0*   PLATELETS 10*3/mm3 231     Results from last 7 days   Lab Units 07/17/22  0402 07/16/22  0421   SODIUM mmol/L 140 139   POTASSIUM mmol/L 3.6 4.2   CHLORIDE mmol/L 102 102   CO2 mmol/L 26.0 23.0   BUN mg/dL 17 13 "   CREATININE mg/dL 0.79 0.74*   CALCIUM mg/dL 9.0 8.9   BILIRUBIN mg/dL  --  0.8   ALK PHOS U/L  --  73   ALT (SGPT) U/L  --  8   AST (SGOT) U/L  --  13   GLUCOSE mg/dL 109* 119*     Results from last 7 days   Lab Units 07/13/22  0208 07/12/22  1558 07/11/22  1209   INR  1.20* 1.44* 0.97     Results from last 7 days   Lab Units 07/14/22  0259   TSH uIU/mL 0.219*   FREE T4 ng/dL 1.91*               Tele:  NSR    Assessment and Plan:  1.  Essential  Hypertension              -  Controlled currently              -  Continue Lopressor 25mg BID ordered, titrate as necessary     2.  Hyperlipidemia              -  Continue high intensity statin, appropriate diet     3.  Coronary Artery Disease              -  Presents with 3 months progressive dyspnea and chest pain.  MV CAD by [facility].              -  EF 50-55% by echo 4/2022              -  POD 5 p CABG, Dr. Anton:  LIMA-LAD, SVG-RCA, SVG- DX of LAD, SVG- OM1-OM2.  REGINA ligation with 35 atrial clip.              -  Continue support.     4.  Hypothyroidism              -  Currently on no supplementation.              -  Labs are abnormal.  Per Intensivist    5.  Post Op Atrial Fibrillation with RVR   -  Amiodarone protocol initiated.  Re bolus Friday.   -  Continue to monitor.   -  Increased beta blocker 7/16 to 50mg twice daily.        60 yo CM with HTN, HLP, TAMIKO, hypothyroid, marijuana use, PTSD, Bipolar with progressive angina and dyspnea found to have MV CAD by [facility].  Now POD 5 after CABG, normal EF.  On BB/Amio per protocol for post op AFib.              Jeannine Robison PA-C  07/17/22, 11:39 EDT

## 2022-07-18 PROBLEM — I48.91 POSTOPERATIVE ATRIAL FIBRILLATION: Status: ACTIVE | Noted: 2022-07-18

## 2022-07-18 PROBLEM — I97.89 POSTOPERATIVE ATRIAL FIBRILLATION: Status: ACTIVE | Noted: 2022-07-18

## 2022-07-18 LAB
ANION GAP SERPL CALCULATED.3IONS-SCNC: 9 MMOL/L (ref 5–15)
BASOPHILS # BLD AUTO: 0.08 10*3/MM3 (ref 0–0.2)
BASOPHILS NFR BLD AUTO: 0.7 % (ref 0–1.5)
BUN SERPL-MCNC: 15 MG/DL (ref 6–20)
BUN/CREAT SERPL: 21.4 (ref 7–25)
CALCIUM SPEC-SCNC: 8.6 MG/DL (ref 8.6–10.5)
CHLORIDE SERPL-SCNC: 105 MMOL/L (ref 98–107)
CO2 SERPL-SCNC: 27 MMOL/L (ref 22–29)
CREAT SERPL-MCNC: 0.7 MG/DL (ref 0.76–1.27)
DEPRECATED RDW RBC AUTO: 40.8 FL (ref 37–54)
EGFRCR SERPLBLD CKD-EPI 2021: 106.1 ML/MIN/1.73
EOSINOPHIL # BLD AUTO: 0.68 10*3/MM3 (ref 0–0.4)
EOSINOPHIL NFR BLD AUTO: 5.9 % (ref 0.3–6.2)
ERYTHROCYTE [DISTWIDTH] IN BLOOD BY AUTOMATED COUNT: 12.7 % (ref 12.3–15.4)
GLUCOSE SERPL-MCNC: 104 MG/DL (ref 65–99)
HCT VFR BLD AUTO: 32.1 % (ref 37.5–51)
HGB BLD-MCNC: 11.1 G/DL (ref 13–17.7)
IMM GRANULOCYTES # BLD AUTO: 0.05 10*3/MM3 (ref 0–0.05)
IMM GRANULOCYTES NFR BLD AUTO: 0.4 % (ref 0–0.5)
LYMPHOCYTES # BLD AUTO: 2.98 10*3/MM3 (ref 0.7–3.1)
LYMPHOCYTES NFR BLD AUTO: 26 % (ref 19.6–45.3)
MCH RBC QN AUTO: 30.8 PG (ref 26.6–33)
MCHC RBC AUTO-ENTMCNC: 34.6 G/DL (ref 31.5–35.7)
MCV RBC AUTO: 89.2 FL (ref 79–97)
MONOCYTES # BLD AUTO: 1.01 10*3/MM3 (ref 0.1–0.9)
MONOCYTES NFR BLD AUTO: 8.8 % (ref 5–12)
NEUTROPHILS NFR BLD AUTO: 58.2 % (ref 42.7–76)
NEUTROPHILS NFR BLD AUTO: 6.64 10*3/MM3 (ref 1.7–7)
NRBC BLD AUTO-RTO: 0 /100 WBC (ref 0–0.2)
PLATELET # BLD AUTO: 258 10*3/MM3 (ref 140–450)
PMV BLD AUTO: 10.1 FL (ref 6–12)
POTASSIUM SERPL-SCNC: 3.9 MMOL/L (ref 3.5–5.2)
QT INTERVAL: 302 MS
QTC INTERVAL: 445 MS
RBC # BLD AUTO: 3.6 10*6/MM3 (ref 4.14–5.8)
SODIUM SERPL-SCNC: 141 MMOL/L (ref 136–145)
WBC NRBC COR # BLD: 11.44 10*3/MM3 (ref 3.4–10.8)

## 2022-07-18 PROCEDURE — 85025 COMPLETE CBC W/AUTO DIFF WBC: CPT | Performed by: INTERNAL MEDICINE

## 2022-07-18 PROCEDURE — 99024 POSTOP FOLLOW-UP VISIT: CPT | Performed by: THORACIC SURGERY (CARDIOTHORACIC VASCULAR SURGERY)

## 2022-07-18 PROCEDURE — 25010000002 HEPARIN (PORCINE) PER 1000 UNITS: Performed by: INTERNAL MEDICINE

## 2022-07-18 PROCEDURE — 80048 BASIC METABOLIC PNL TOTAL CA: CPT | Performed by: INTERNAL MEDICINE

## 2022-07-18 PROCEDURE — 97530 THERAPEUTIC ACTIVITIES: CPT

## 2022-07-18 PROCEDURE — 25010000002 HEPARIN (PORCINE) PER 1000 UNITS: Performed by: STUDENT IN AN ORGANIZED HEALTH CARE EDUCATION/TRAINING PROGRAM

## 2022-07-18 PROCEDURE — 99232 SBSQ HOSP IP/OBS MODERATE 35: CPT | Performed by: INTERNAL MEDICINE

## 2022-07-18 RX ORDER — MORPHINE SULFATE 2 MG/ML
2 INJECTION, SOLUTION INTRAMUSCULAR; INTRAVENOUS
Status: DISCONTINUED | OUTPATIENT
Start: 2022-07-18 | End: 2022-07-20 | Stop reason: HOSPADM

## 2022-07-18 RX ORDER — SODIUM CHLORIDE 0.9 % (FLUSH) 0.9 %
10 SYRINGE (ML) INJECTION EVERY 8 HOURS SCHEDULED
Status: DISCONTINUED | OUTPATIENT
Start: 2022-07-18 | End: 2022-07-20 | Stop reason: HOSPADM

## 2022-07-18 RX ORDER — POLYETHYLENE GLYCOL 3350 17 G/17G
17 POWDER, FOR SOLUTION ORAL DAILY
Status: DISCONTINUED | OUTPATIENT
Start: 2022-07-18 | End: 2022-07-20 | Stop reason: HOSPADM

## 2022-07-18 RX ORDER — AMOXICILLIN 250 MG
2 CAPSULE ORAL 2 TIMES DAILY PRN
Status: DISCONTINUED | OUTPATIENT
Start: 2022-07-18 | End: 2022-07-20 | Stop reason: HOSPADM

## 2022-07-18 RX ORDER — BISACODYL 5 MG/1
10 TABLET, DELAYED RELEASE ORAL DAILY PRN
Status: DISCONTINUED | OUTPATIENT
Start: 2022-07-18 | End: 2022-07-20 | Stop reason: HOSPADM

## 2022-07-18 RX ORDER — BISACODYL 10 MG
10 SUPPOSITORY, RECTAL RECTAL DAILY PRN
Status: DISCONTINUED | OUTPATIENT
Start: 2022-07-18 | End: 2022-07-20 | Stop reason: HOSPADM

## 2022-07-18 RX ORDER — LEVOTHYROXINE SODIUM 0.1 MG/1
100 TABLET ORAL
Status: DISCONTINUED | OUTPATIENT
Start: 2022-07-18 | End: 2022-07-20 | Stop reason: HOSPADM

## 2022-07-18 RX ORDER — SODIUM CHLORIDE 0.9 % (FLUSH) 0.9 %
10 SYRINGE (ML) INJECTION EVERY 12 HOURS SCHEDULED
Status: DISCONTINUED | OUTPATIENT
Start: 2022-07-18 | End: 2022-07-20 | Stop reason: HOSPADM

## 2022-07-18 RX ORDER — DOCUSATE SODIUM 100 MG/1
100 CAPSULE, LIQUID FILLED ORAL 2 TIMES DAILY PRN
Status: DISCONTINUED | OUTPATIENT
Start: 2022-07-18 | End: 2022-07-20 | Stop reason: HOSPADM

## 2022-07-18 RX ADMIN — METOPROLOL TARTRATE 50 MG: 50 TABLET, FILM COATED ORAL at 08:01

## 2022-07-18 RX ADMIN — Medication 10 ML: at 20:37

## 2022-07-18 RX ADMIN — QUETIAPINE FUMARATE 50 MG: 25 TABLET ORAL at 20:37

## 2022-07-18 RX ADMIN — LEVOTHYROXINE SODIUM 100 MCG: 0.1 TABLET ORAL at 10:23

## 2022-07-18 RX ADMIN — BUSPIRONE HYDROCHLORIDE 10 MG: 10 TABLET ORAL at 08:01

## 2022-07-18 RX ADMIN — HYDROCODONE BITARTRATE AND ACETAMINOPHEN 1 TABLET: 7.5; 325 TABLET ORAL at 05:53

## 2022-07-18 RX ADMIN — AMIODARONE HYDROCHLORIDE 200 MG: 200 TABLET ORAL at 02:08

## 2022-07-18 RX ADMIN — GUAIFENESIN 600 MG: 600 TABLET, EXTENDED RELEASE ORAL at 08:01

## 2022-07-18 RX ADMIN — HEPARIN SODIUM 5000 UNITS: 5000 INJECTION INTRAVENOUS; SUBCUTANEOUS at 20:43

## 2022-07-18 RX ADMIN — SENNOSIDES AND DOCUSATE SODIUM 2 TABLET: 50; 8.6 TABLET ORAL at 20:37

## 2022-07-18 RX ADMIN — METOPROLOL TARTRATE 50 MG: 50 TABLET, FILM COATED ORAL at 20:37

## 2022-07-18 RX ADMIN — ACETAMINOPHEN 325MG 650 MG: 325 TABLET ORAL at 02:09

## 2022-07-18 RX ADMIN — ACETAMINOPHEN 325MG 650 MG: 325 TABLET ORAL at 17:38

## 2022-07-18 RX ADMIN — DULOXETINE HYDROCHLORIDE 60 MG: 60 CAPSULE, DELAYED RELEASE ORAL at 08:01

## 2022-07-18 RX ADMIN — GABAPENTIN 100 MG: 100 CAPSULE ORAL at 08:01

## 2022-07-18 RX ADMIN — HEPARIN SODIUM 5000 UNITS: 5000 INJECTION INTRAVENOUS; SUBCUTANEOUS at 05:53

## 2022-07-18 RX ADMIN — GUAIFENESIN 600 MG: 600 TABLET, EXTENDED RELEASE ORAL at 20:37

## 2022-07-18 RX ADMIN — HEPARIN SODIUM 5000 UNITS: 5000 INJECTION INTRAVENOUS; SUBCUTANEOUS at 14:37

## 2022-07-18 RX ADMIN — ASPIRIN 325 MG: 325 TABLET, COATED ORAL at 08:00

## 2022-07-18 RX ADMIN — BUSPIRONE HYDROCHLORIDE 10 MG: 10 TABLET ORAL at 17:38

## 2022-07-18 RX ADMIN — Medication 10 ML: at 14:37

## 2022-07-18 RX ADMIN — SENNOSIDES AND DOCUSATE SODIUM 2 TABLET: 50; 8.6 TABLET ORAL at 08:00

## 2022-07-18 RX ADMIN — HYDROCODONE BITARTRATE AND ACETAMINOPHEN 1 TABLET: 7.5; 325 TABLET ORAL at 14:37

## 2022-07-18 RX ADMIN — AMIODARONE HYDROCHLORIDE 200 MG: 200 TABLET ORAL at 17:37

## 2022-07-18 RX ADMIN — ACETAMINOPHEN 325MG 650 MG: 325 TABLET ORAL at 10:19

## 2022-07-18 RX ADMIN — PANTOPRAZOLE SODIUM 40 MG: 40 TABLET, DELAYED RELEASE ORAL at 05:53

## 2022-07-18 RX ADMIN — ATORVASTATIN CALCIUM 40 MG: 40 TABLET, FILM COATED ORAL at 20:37

## 2022-07-18 RX ADMIN — BUSPIRONE HYDROCHLORIDE 10 MG: 10 TABLET ORAL at 20:37

## 2022-07-18 RX ADMIN — POLYETHYLENE GLYCOL 3350 17 G: 17 POWDER, FOR SOLUTION ORAL at 10:24

## 2022-07-18 RX ADMIN — AMIODARONE HYDROCHLORIDE 200 MG: 200 TABLET ORAL at 08:01

## 2022-07-18 NOTE — PROGRESS NOTES
Intensive Care Follow-up     Hospital:  LOS: 6 days   Mr. Josh Horan, 61 y.o. male is followed for:   Coronary artery disease of native artery of native heart with stable angina pectoris (HCC)            History of present illness:   61 y.o.male with relevant PMH of HTN, HLP, GERD, marijuana use, hypothyroidism, TAMIKO not CPAP compliant, anxiety, PTSD, depression, bipolar DO, recently diagnosed MVCAD admitted 7/12/2022 post op 5vCABG by Dr. Anton.      Subjective   Interval History:  Patient doing well this morning.  Denies any chest pain, nausea, fever, or chills.  States he has not had a bowel movement yet but has not eaten much.  No other acute issues.             The patient's past medical, surgical and social history were reviewed and updated in Epic as appropriate.       Objective     Infusions:  phenylephrine, 0.5-3 mcg/kg/min      Medications:  acetaminophen, 650 mg, Oral, Q8H  amiodarone, 200 mg, Oral, Q8H   Followed by  [START ON 7/22/2022] amiodarone, 200 mg, Oral, Q12H   Followed by  [START ON 8/5/2022] amiodarone, 200 mg, Oral, Daily  aspirin, 325 mg, Oral, Daily  atorvastatin, 40 mg, Oral, Nightly  busPIRone, 10 mg, Oral, TID  DULoxetine, 60 mg, Oral, Daily  gabapentin, 100 mg, Oral, TID  guaiFENesin, 600 mg, Oral, Q12H  heparin (porcine), 5,000 Units, Subcutaneous, Q8H  metoprolol tartrate, 50 mg, Oral, Q12H  pantoprazole, 40 mg, Oral, Q AM  pharmacy consult - [place], , Does not apply, Daily  polyethylene glycol, 17 g, Oral, Daily  QUEtiapine, 50 mg, Oral, Nightly  senna-docusate sodium, 2 tablet, Oral, BID      I reviewed the patient's medications.    Vital Sign Min/Max for last 24 hours  Temp  Min: 97.8 °F (36.6 °C)  Max: 98.8 °F (37.1 °C)   BP  Min: 110/67  Max: 150/96   Pulse  Min: 66  Max: 134   Resp  Min: 16  Max: 20   SpO2  Min: 90 %  Max: 96 %   Flow (L/min)  Min: 2  Max: 2       Input/Output for last 24 hour shift  07/17 0701 - 07/18 0700  In: 1020 [P.O.:1020]  Out: 1150 [Urine:1150]       GENERAL : NAD, conversant  RESPIRATORY/THORAX : normal respiratory effort and no intercostal retractions, CTAB  CARDIOVASCULAR : Normal S1/S2, RRR. no lower ext edema.  GASTROINTESTINAL : Soft, NT/ND. BS x 4 normoactive. No hepatosplenomegaly.  MUSCULOSKELETAL : No cyanosis, clubbing, or ischemia  NEUROLOGICAL: alert and oriented to person, place and time  PSYCHOLOGICAL : Appropriate affect      Results from last 7 days   Lab Units 07/18/22  0237 07/17/22  0402 07/16/22  0421   WBC 10*3/mm3 11.44* 10.53 11.62*   HEMOGLOBIN g/dL 11.1* 10.8* 10.2*   PLATELETS 10*3/mm3 258 231 185     Results from last 7 days   Lab Units 07/18/22  0320 07/17/22  0402 07/16/22  0421 07/15/22  0118 07/14/22  1533 07/14/22  0259   SODIUM mmol/L 141 140 139 136  --  138   POTASSIUM mmol/L 3.9 3.6 4.2 4.2  --  3.9   CO2 mmol/L 27.0 26.0 23.0 26.0  --  28.0   BUN mg/dL 15 17 13 10  --  10   CREATININE mg/dL 0.70* 0.79 0.74* 0.77  --  0.68*   MAGNESIUM mg/dL  --   --  2.1 2.1  --  2.5   PHOSPHORUS mg/dL  --  3.2 2.2* 2.7   < > 2.4*   GLUCOSE mg/dL 104* 109* 119* 139*  --  114*    < > = values in this interval not displayed.     Estimated Creatinine Clearance: 126.8 mL/min (A) (by C-G formula based on SCr of 0.7 mg/dL (L)).    Results from last 7 days   Lab Units 07/13/22  0106   PH, ARTERIAL pH units 7.392   PCO2, ARTERIAL mm Hg 40.1   PO2 ART mm Hg 111.0*       I reviewed the patient's new clinical results.  I reviewed the patient's new imaging results/reports including actual images and agree with reports.         Assessment & Plan   Impression        Coronary artery disease of native artery of native heart with stable angina pectoris (HCC)    Hypertension    Hyperlipidemia    Anxiety    Acid reflux    TAMIKO (obstructive sleep apnea)    Depression    Bipolar 1 disorder (HCC)    PTSD (post-traumatic stress disorder)    Marijuana use    Hypothyroidism       Plan        61 y.o.male with relevant PMH of HTN, HLP, GERD, marijuana use,  hypothyroidism, TAMIKO not CPAP compliant, anxiety, PTSD, depression, bipolar DO, recently diagnosed MVCAD admitted 7/12/2022 post op 5vCABG by Dr. Anton.  Postoperative course complicated by atrial fibrillation.    -Postop orders per CT surgery  -Continue amiodarone for atrial fibrillation  - aspirin/statin/beta-blocker for CAD  - continue home bipolar medications  - gabapentin for neuropathy  -Increase bowel regimen to docusate/senna and MiraLAX  -Mobilize patient  -Aggressive pulmonary toilet  -Heparin for DVT prophylaxis  -AM labs  -Patient is medically okay to be transferred to the floor    Plan of care and goals reviewed with multidisciplinary/antibiotic stewardship team during rounds.   I discussed the patient's findings and my recommendations with patient and nursing staff       Anuj White DO  Pulmonary, Critical care and Sleep Medicine

## 2022-07-18 NOTE — PLAN OF CARE
Goal Outcome Evaluation:  Plan of Care Reviewed With: patient        Progress: improving  Outcome Evaluation: Pt ambulated 440ft with CGA and UE support on tele monitor. Pt quickly progressed to SBA. Pt demonstrated good balance and stability. Denied any c/o pain and did not require any rest breaks. Continue to progress as appropriate.

## 2022-07-18 NOTE — PROGRESS NOTES
CTS Progress Note       LOS: 6 days   Patient Care Team:  Jerrica Madrid APRN as PCP - General (Family Medicine)    Chief Complaint: Coronary artery disease of native artery of native heart with stable angina pectoris (HCC)    Vital Signs:  Temp:  [97.8 °F (36.6 °C)-98.8 °F (37.1 °C)] 98.8 °F (37.1 °C)  Heart Rate:  [] 75  Resp:  [16-20] 20  BP: (110-150)/() 150/94    Physical Exam:  Alert conversant breathing unlabored     Results:   Results from last 7 days   Lab Units 07/18/22  0237   WBC 10*3/mm3 11.44*   HEMOGLOBIN g/dL 11.1*   HEMATOCRIT % 32.1*   PLATELETS 10*3/mm3 258     Results from last 7 days   Lab Units 07/18/22  0320   SODIUM mmol/L 141   POTASSIUM mmol/L 3.9   CHLORIDE mmol/L 105   CO2 mmol/L 27.0   BUN mg/dL 15   CREATININE mg/dL 0.70*   GLUCOSE mg/dL 104*   CALCIUM mg/dL 8.6           Imaging Results (Last 24 Hours)     ** No results found for the last 24 hours. **          Assessment      Coronary artery disease of native artery of native heart with stable angina pectoris (HCC)    Hypertension    Hyperlipidemia    Anxiety    Acid reflux    TAMIKO (obstructive sleep apnea)    Depression    Bipolar 1 disorder (HCC)    PTSD (post-traumatic stress disorder)    Marijuana use    Hypothyroidism    Still in A. fib but heart rate controlled in the 80s.  Please note patient's left atrial appendage was ligated    Plan   Discontinue IJ catheter  Transfer to telemetry    Please note that portions of this note were completed with a voice recognition program. Efforts were made to edit the dictations, but occasionally words are mistranscribed.    Baldomero Anton MD  07/18/22  06:44 EDT

## 2022-07-18 NOTE — CASE MANAGEMENT/SOCIAL WORK
Continued Stay Note  [facility]     Patient Name: Josh Horan  MRN: [id number]  Today's Date: 7/18/2022    Admit Date: 7/12/2022     Discharge Plan     Row Name 07/18/22 1116       Plan    Plan Ongoing    Plan Comments Spoke with patient at bedside who states his disposition plan remains to his son, Lan, home in [facility].  Patient ambulating well with PT and currently on no O2.  Mr. Horan reports his son will be available to assist with care, meals and transportation home.  Case Managment will continue to follow and assist with any discharge planning arrangements.                                     Discharge Codes    No documentation.               Expected Discharge Date and Time     Expected Discharge Date Expected Discharge Time    Jul 19, 2022             Natalie Dent RN

## 2022-07-18 NOTE — THERAPY TREATMENT NOTE
Patient Name: Josh Horan  : 1960    MRN: [id number]                              Today's Date: 2022       Admit Date: 2022    Visit Dx:     ICD-10-CM ICD-9-CM   1. Coronary artery disease of native artery of native heart with stable angina pectoris (HCC)  I25.118 414.01     413.9     Patient Active Problem List   Diagnosis   • Coronary artery disease of native artery of native heart with stable angina pectoris (HCC)   • Abnormal findings on diagnostic imaging of heart and coronary circulation   • Hypertension   • Hyperlipidemia   • Anxiety   • Acid reflux   • TAMIKO (obstructive sleep apnea)   • Depression   • Bipolar 1 disorder (HCC)   • PTSD (post-traumatic stress disorder)   • Marijuana use   • Hypothyroidism   • Postoperative atrial fibrillation (HCC)     Past Medical History:   Diagnosis Date   • Acid reflux    • Anxiety    • Arthritis    • Back pain    • Bipolar 1 disorder (HCC)    • Coronary artery disease    • Depression    • Headache    • Hiatal hernia    • Hyperlipidemia    • Hypertension    • Hyperthyroidism    • Hypothyroidism    • Lyme disease    • Migraine    • MRSA infection     1.5-2 years ago- back   • Panic attacks    • PTSD (post-traumatic stress disorder)    • Sciatic nerve pain    • Seasonal allergies    • Sleep apnea     doesnt use machine   • Tinnitus    • Wears glasses      Past Surgical History:   Procedure Laterality Date   • CARDIAC CATHETERIZATION      2022 Dr. Salguero   • CORONARY ARTERY BYPASS GRAFT N/A 2022    Procedure: MEDIAN STERNOTOMY CORONARY ARTERY BYPASS GRAFTING X5 , UTILIZING THE LEFT INTERNAL MAMMERY GRAFT, ENDOSCOPIC VEIN HARVEST OF THE GREATER RIGHT SAPHENOUS VEIN WITH EXPLORATION OF THE LEFT LEG;  Surgeon: Baldomero Anton MD;  Location: [facility];  Service: Cardiothoracic;  Laterality: N/A;   • HERNIA REPAIR Left     umbilical hernia repair - pt unsure about mesh   • OTHER SURGICAL HISTORY      Lymph node removal neck   • VASECTOMY         General Information     Row Name 07/18/22 1158          Physical Therapy Time and Intention    Document Type therapy note (daily note)  -KG     Mode of Treatment physical therapy  -KG     Row Name 07/18/22 1158          General Information    Existing Precautions/Restrictions cardiac;sternal  -KG     Row Name 07/18/22 1158          Cognition    Orientation Status (Cognition) oriented x 4  -KG     Row Name 07/18/22 1158          Safety Issues, Functional Mobility    Safety Issues Affecting Function (Mobility) safety precaution awareness;safety precautions follow-through/compliance  -KG     Impairments Affecting Function (Mobility) endurance/activity tolerance;strength  -KG           User Key  (r) = Recorded By, (t) = Taken By, (c) = Cosigned By    Initials Name Provider Type    KG Gabriella Pelaez, PT Physical Therapist               Mobility     Row Name 07/18/22 1158          Bed Mobility    Comment, (Bed Mobility) UIC  -KG     Row Name 07/18/22 1158          Transfers    Comment, (Transfers) VC's for sequencing.  -KG     Row Name 07/18/22 1158          Sit-Stand Transfer    Sit-Stand Gibson (Transfers) standby assist;verbal cues  -KG     Row Name 07/18/22 1158          Gait/Stairs (Locomotion)    Gibson Level (Gait) contact guard;verbal cues  quickly progressed to SBA  -KG     Assistive Device (Gait) other (see comments)  UE support on tele monitor  -KG     Distance in Feet (Gait) 440  -KG     Deviations/Abnormal Patterns (Gait) duke decreased;stride length decreased  -KG     Comment, (Gait/Stairs) Pt demonstrated step through gait pattern with slow duke and decreased step length. Improved duke with continued forward ambulation. Pt demonstrated good balance and stability. Did not require any standing rest breaks. Denied any c/o pain.  -KG           User Key  (r) = Recorded By, (t) = Taken By, (c) = Cosigned By    Initials Name Provider Type    KG Gabriella Pelaez, PT Physical  Therapist               Obj/Interventions     Row Name 07/18/22 1200          Balance    Balance Assessment sitting static balance;standing static balance;standing dynamic balance  -KG     Static Sitting Balance independent  -KG     Position, Sitting Balance unsupported;sitting in chair  -KG     Static Standing Balance supervision  -KG     Dynamic Standing Balance standby assist  -KG     Position/Device Used, Standing Balance unsupported  -KG           User Key  (r) = Recorded By, (t) = Taken By, (c) = Cosigned By    Initials Name Provider Type    KG Gabriella Pelaez N, PT Physical Therapist               Goals/Plan    No documentation.                Clinical Impression     [facility] Name 07/18/22 1203          Pain    Pretreatment Pain Rating 0/10 - no pain  -KG     Posttreatment Pain Rating 0/10 - no pain  -KG     Row Name 07/18/22 1203          Plan of Care Review    Plan of Care Reviewed With patient  -KG     Progress improving  -KG     Outcome Evaluation Pt ambulated 440ft with CGA and UE support on tele monitor. Pt quickly progressed to SBA. Pt demonstrated good balance and stability. Denied any c/o pain and did not require any rest breaks. Continue to progress as appropriate.  -KG     Row Name 07/18/22 1203          Vital Signs    Pre Systolic BP Rehab 150  -KG     Pre Treatment Diastolic BP 96  -KG     Pretreatment Heart Rate (beats/min) 68  -KG     Posttreatment Heart Rate (beats/min) 68  -KG     Pre SpO2 (%) 96  -KG     O2 Delivery Pre Treatment room air  -KG     Post SpO2 (%) 95  -KG     O2 Delivery Post Treatment room air  -KG     Pre Patient Position Sitting  -KG     Intra Patient Position Standing  -KG     Post Patient Position Sitting  -KG     Row Name 07/18/22 1203          Positioning and Restraints    Pre-Treatment Position sitting in chair/recliner  -KG     Post Treatment Position chair  -KG     In Chair notified nsg;reclined;call light within reach;encouraged to call for assist;RUE elevated;LUE  elevated;legs elevated  -KG           User Key  (r) = Recorded By, (t) = Taken By, (c) = Cosigned By    Initials Name Provider Type    Gabriella See, PIPO Physical Therapist               Outcome Measures     Row Name 07/18/22 1204 07/18/22 0800       How much help from another person do you currently need...    Turning from your back to your side while in flat bed without using bedrails? 4  -KG 4  -WILLIAM    Moving from lying on back to sitting on the side of a flat bed without bedrails? 3  -KG 3  -WILLIAM    Moving to and from a bed to a chair (including a wheelchair)? 3  -KG 3  -WILLIAM    Standing up from a chair using your arms (e.g., wheelchair, bedside chair)? 3  -KG 3  -WILLIAM    Climbing 3-5 steps with a railing? 3  -KG 3  -WILLIAM    To walk in hospital room? 3  -KG 4  -WILLIAM    AM-PAC 6 Clicks Score (PT) 19  -KG 20  -WILLIAM    Highest level of mobility 6 --> Walked 10 steps or more  -KG 6 --> Walked 10 steps or more  -WILLIAM    Row Name 07/18/22 1204          Functional Assessment    Outcome Measure Options AM-PAC 6 Clicks Basic Mobility (PT)  -KG           User Key  (r) = Recorded By, (t) = Taken By, (c) = Cosigned By    Initials Name Provider Type    Kajal Brown, RN Registered Nurse    Gabriella See, PT Physical Therapist                             Physical Therapy Education                 Title: PT OT SLP Therapies (Done)     Topic: Physical Therapy (Done)     Point: Mobility training (Done)     Learning Progress Summary           Patient Acceptance, E, VU,DU by JAQUELINE at 7/18/2022 0902    Acceptance, E,D, VU,DU by CASSANDRA at 7/17/2022 1403    Acceptance, E, VU,DU by MOON at 7/15/2022 1106    Acceptance, E, NR by SILVIO at 7/14/2022 0915    Acceptance, E, NR by JAQUELINE at 7/13/2022 1007                   Point: Home exercise program (Done)     Learning Progress Summary           Patient Acceptance, E, VU,DU by JAQUELINE at 7/18/2022 0902    Acceptance, E,D, VU,DU by CASSANDRA at 7/17/2022 1403    Acceptance, E, VU,DU by MOON at 7/15/2022  1106    Acceptance, E, NR by SILVIO at 7/14/2022 0915    Acceptance, E, NR by KG at 7/13/2022 1007                   Point: Body mechanics (Done)     Learning Progress Summary           Patient Acceptance, E, VU,DU by KG at 7/18/2022 0902    Acceptance, E,D, VU,DU by LS at 7/17/2022 1403    Acceptance, E, VU,DU by WJ at 7/15/2022 1106    Acceptance, E, NR by SILVIO at 7/14/2022 0915    Acceptance, E, NR by KG at 7/13/2022 1007                   Point: Precautions (Done)     Learning Progress Summary           Patient Acceptance, E, VU,DU by KG at 7/18/2022 0902    Acceptance, E,D, VU,DU by LS at 7/17/2022 1403    Acceptance, E, VU,DU by WJ at 7/15/2022 1106    Acceptance, E, NR by SILVIO at 7/14/2022 0915    Acceptance, E, NR by KG at 7/13/2022 1007                               User Key     Initials Effective Dates Name Provider Type Discipline    SILVIO 06/16/21 -  Giselle Darnell, PT Physical Therapist PT    LS 06/16/21 -  Diana Chappell, PT Physical Therapist PT    KG 05/22/20 -  Gabriella Pelaez PT Physical Therapist PT    WJ 05/31/22 -  Panda Blum, PT Student PT Student PT              PT Recommendation and Plan  Planned Therapy Interventions (PT): balance training, bed mobility training, gait training, strengthening, transfer training  Plan of Care Reviewed With: patient  Progress: improving  Outcome Evaluation: Pt ambulated 440ft with CGA and UE support on tele monitor. Pt quickly progressed to SBA. Pt demonstrated good balance and stability. Denied any c/o pain and did not require any rest breaks. Continue to progress as appropriate.     Time Calculation:    PT Charges     Row Name 07/18/22 0902             Time Calculation    Start Time 0902  -KG      PT Received On 07/18/22  -KG      PT Goal Re-Cert Due Date 07/23/22  -KG              Time Calculation- PT    Total Timed Code Minutes- PT 16 minute(s)  -KG              Timed Charges    99665 - PT Therapeutic Activity Minutes 16  -KG              Total Minutes     Timed Charges Total Minutes 16  -KG       Total Minutes 16  -KG            User Key  (r) = Recorded By, (t) = Taken By, (c) = Cosigned By    Initials Name Provider Type    KG Gabriella Pelaez, PT Physical Therapist              Therapy Charges for Today     Code Description Service Date Service Provider Modifiers Qty    [id number]  PT THERAPEUTIC ACT EA 15 MIN 7/18/2022 Gabriella Pelaez, PT GP 1          PT G-Codes  Outcome Measure Options: AM-PAC 6 Clicks Basic Mobility (PT)  AM-PAC 6 Clicks Score (PT): 19    Ania Pelaez, PIPO  7/18/2022

## 2022-07-18 NOTE — PROGRESS NOTES
" [facility] - Progress Note    Josh Horan  1960  S260/1    07/18/22, 09:53 EDT      Chief Complaint: Following for post op management of BP, arrhythmias    Subjective:   No chest pain shortness of breath or palpitations.      Review of Systems:  Pertinent positives are listed above and in physical exam.  All others have been reviewed and are negative.    acetaminophen, 650 mg, Oral, Q8H  amiodarone, 200 mg, Oral, Q8H   Followed by  [START ON 7/22/2022] amiodarone, 200 mg, Oral, Q12H   Followed by  [START ON 8/5/2022] amiodarone, 200 mg, Oral, Daily  aspirin, 325 mg, Oral, Daily  atorvastatin, 40 mg, Oral, Nightly  busPIRone, 10 mg, Oral, TID  DULoxetine, 60 mg, Oral, Daily  gabapentin, 100 mg, Oral, TID  guaiFENesin, 600 mg, Oral, Q12H  heparin (porcine), 5,000 Units, Subcutaneous, Q8H  metoprolol tartrate, 50 mg, Oral, Q12H  pantoprazole, 40 mg, Oral, Q AM  pharmacy consult - MTM, , Does not apply, Daily  QUEtiapine, 50 mg, Oral, Nightly  senna-docusate sodium, 2 tablet, Oral, BID        Objective:  Vitals:   height is 170.2 cm (67\") and weight is 98 kg (216 lb). His oral temperature is 98.8 °F (37.1 °C). His blood pressure is 150/94 and his pulse is 75. His respiration is 20 and oxygen saturation is 96%.       Intake/Output Summary (Last 24 hours) at 7/18/2022 0735  Last data filed at 7/18/2022 0600  Gross per 24 hour   Intake 1020 ml   Output 1150 ml   Net -130 ml       Physical Exam:  General:  WN, NAD, A and O x3.  CV: Regular rate and rhythm no murmur, rub, or gallop.  Resp:  CTA Tay, equal, nonlabored.  Abd:  Soft, + BS, no organomegaly. Nontender to palpation.  Extrem:  No edema BLE, 2+ pedal/PT pulses.            Results from last 7 days   Lab Units 07/18/22  0237   WBC 10*3/mm3 11.44*   HEMOGLOBIN g/dL 11.1*   HEMATOCRIT % 32.1*   PLATELETS 10*3/mm3 258     Results from last 7 days   Lab Units 07/18/22  0320 07/17/22  0402 07/16/22  0421   SODIUM mmol/L 141   < > 139   POTASSIUM " mmol/L 3.9   < > 4.2   CHLORIDE mmol/L 105   < > 102   CO2 mmol/L 27.0   < > 23.0   BUN mg/dL 15   < > 13   CREATININE mg/dL 0.70*   < > 0.74*   CALCIUM mg/dL 8.6   < > 8.9   BILIRUBIN mg/dL  --   --  0.8   ALK PHOS U/L  --   --  73   ALT (SGPT) U/L  --   --  8   AST (SGOT) U/L  --   --  13   GLUCOSE mg/dL 104*   < > 119*    < > = values in this interval not displayed.     Results from last 7 days   Lab Units 07/13/22  0208 07/12/22  1558 07/11/22  1209   INR  1.20* 1.44* 0.97     Results from last 7 days   Lab Units 07/14/22  0259   TSH uIU/mL 0.219*   FREE T4 ng/dL 1.91*               Tele:  Atrial  Fibrillation    Assessment and Plan:  1.  Essential  Hypertension              -  Controlled currently              -  Continue Lopressor 25mg BID ordered, titrate as necessary     2.  Hyperlipidemia              -  Continue high intensity statin, appropriate diet     3.  Coronary Artery Disease              -  Presents with 3 months progressive dyspnea and chest pain.  MV CAD by [facility].              -  EF 50-55% by echo 4/2022              -  POD 3 p CABG, Dr. Anton:  LIMA-LAD, SVG-RCA, SVG- DX of LAD, SVG- OM1-OM2.  REGINA ligation with 35 atrial clip.              -  Continue support.     4.  Hypothyroidism              -  Currently on no supplementation.              -  Labs are abnormal.  Per Intensivist    5.  Post Op Atrial Fibrillation with RVR   -  Amiodarone protocol initiated.     -  Continue to monitor.        62 yo CM with HTN, HLP, TAMIKO, hypothyroid, marijuana use, PTSD, Bipolar with progressive angina and dyspnea found to have MV CAD by [facility].  Now POD 5 after CABG with left atrial appendage ligation, normal EF.  On BB/Amio per protocol for post op AFib.   No change medical therapy.  To telemetry  Home soon      Deandre Horowitz MD  07/18/22, 09:56 EDT

## 2022-07-18 NOTE — PLAN OF CARE
Goal Outcome Evaluation:      Pt. Neuro intact. On room air. VSS. Pt. In/out of sinus/afib, but mostly in sinus rhythm. Adequate UOP.

## 2022-07-19 LAB
ANION GAP SERPL CALCULATED.3IONS-SCNC: 11 MMOL/L (ref 5–15)
BUN SERPL-MCNC: 13 MG/DL (ref 6–20)
BUN/CREAT SERPL: 14.8 (ref 7–25)
CALCIUM SPEC-SCNC: 9 MG/DL (ref 8.6–10.5)
CHLORIDE SERPL-SCNC: 103 MMOL/L (ref 98–107)
CO2 SERPL-SCNC: 24 MMOL/L (ref 22–29)
CREAT SERPL-MCNC: 0.88 MG/DL (ref 0.76–1.27)
EGFRCR SERPLBLD CKD-EPI 2021: 99.1 ML/MIN/1.73
GLUCOSE SERPL-MCNC: 118 MG/DL (ref 65–99)
HCT VFR BLD AUTO: 32.5 % (ref 37.5–51)
HGB BLD-MCNC: 11.2 G/DL (ref 13–17.7)
POTASSIUM SERPL-SCNC: 3.9 MMOL/L (ref 3.5–5.2)
SODIUM SERPL-SCNC: 138 MMOL/L (ref 136–145)

## 2022-07-19 PROCEDURE — 99233 SBSQ HOSP IP/OBS HIGH 50: CPT | Performed by: HOSPITALIST

## 2022-07-19 PROCEDURE — 85014 HEMATOCRIT: CPT | Performed by: STUDENT IN AN ORGANIZED HEALTH CARE EDUCATION/TRAINING PROGRAM

## 2022-07-19 PROCEDURE — 25010000002 HEPARIN (PORCINE) PER 1000 UNITS: Performed by: STUDENT IN AN ORGANIZED HEALTH CARE EDUCATION/TRAINING PROGRAM

## 2022-07-19 PROCEDURE — 85018 HEMOGLOBIN: CPT | Performed by: STUDENT IN AN ORGANIZED HEALTH CARE EDUCATION/TRAINING PROGRAM

## 2022-07-19 PROCEDURE — 80048 BASIC METABOLIC PNL TOTAL CA: CPT | Performed by: STUDENT IN AN ORGANIZED HEALTH CARE EDUCATION/TRAINING PROGRAM

## 2022-07-19 PROCEDURE — 99024 POSTOP FOLLOW-UP VISIT: CPT

## 2022-07-19 RX ADMIN — DULOXETINE HYDROCHLORIDE 60 MG: 60 CAPSULE, DELAYED RELEASE ORAL at 09:32

## 2022-07-19 RX ADMIN — PANTOPRAZOLE SODIUM 40 MG: 40 TABLET, DELAYED RELEASE ORAL at 05:44

## 2022-07-19 RX ADMIN — BUSPIRONE HYDROCHLORIDE 10 MG: 10 TABLET ORAL at 21:27

## 2022-07-19 RX ADMIN — HEPARIN SODIUM 5000 UNITS: 5000 INJECTION INTRAVENOUS; SUBCUTANEOUS at 14:03

## 2022-07-19 RX ADMIN — BUSPIRONE HYDROCHLORIDE 10 MG: 10 TABLET ORAL at 16:20

## 2022-07-19 RX ADMIN — HYDROCODONE BITARTRATE AND ACETAMINOPHEN 1 TABLET: 7.5; 325 TABLET ORAL at 14:02

## 2022-07-19 RX ADMIN — Medication 10 ML: at 16:22

## 2022-07-19 RX ADMIN — ACETAMINOPHEN 325MG 650 MG: 325 TABLET ORAL at 09:32

## 2022-07-19 RX ADMIN — METOPROLOL TARTRATE 50 MG: 50 TABLET, FILM COATED ORAL at 09:33

## 2022-07-19 RX ADMIN — BUSPIRONE HYDROCHLORIDE 10 MG: 10 TABLET ORAL at 09:33

## 2022-07-19 RX ADMIN — ACETAMINOPHEN 325MG 650 MG: 325 TABLET ORAL at 01:08

## 2022-07-19 RX ADMIN — OXYCODONE HYDROCHLORIDE AND ACETAMINOPHEN 2 TABLET: 5; 325 TABLET ORAL at 06:56

## 2022-07-19 RX ADMIN — AMIODARONE HYDROCHLORIDE 200 MG: 200 TABLET ORAL at 16:20

## 2022-07-19 RX ADMIN — SENNOSIDES AND DOCUSATE SODIUM 2 TABLET: 50; 8.6 TABLET ORAL at 09:33

## 2022-07-19 RX ADMIN — Medication 10 ML: at 09:33

## 2022-07-19 RX ADMIN — GUAIFENESIN 600 MG: 600 TABLET, EXTENDED RELEASE ORAL at 21:27

## 2022-07-19 RX ADMIN — POLYETHYLENE GLYCOL 3350 17 G: 17 POWDER, FOR SOLUTION ORAL at 09:32

## 2022-07-19 RX ADMIN — AMIODARONE HYDROCHLORIDE 200 MG: 200 TABLET ORAL at 01:08

## 2022-07-19 RX ADMIN — OXYCODONE HYDROCHLORIDE AND ACETAMINOPHEN 2 TABLET: 5; 325 TABLET ORAL at 19:35

## 2022-07-19 RX ADMIN — HEPARIN SODIUM 5000 UNITS: 5000 INJECTION INTRAVENOUS; SUBCUTANEOUS at 05:44

## 2022-07-19 RX ADMIN — ASPIRIN 325 MG: 325 TABLET, COATED ORAL at 09:32

## 2022-07-19 RX ADMIN — SENNOSIDES AND DOCUSATE SODIUM 2 TABLET: 50; 8.6 TABLET ORAL at 21:27

## 2022-07-19 RX ADMIN — GUAIFENESIN 600 MG: 600 TABLET, EXTENDED RELEASE ORAL at 09:33

## 2022-07-19 RX ADMIN — Medication 10 ML: at 21:37

## 2022-07-19 RX ADMIN — HEPARIN SODIUM 5000 UNITS: 5000 INJECTION INTRAVENOUS; SUBCUTANEOUS at 21:27

## 2022-07-19 RX ADMIN — AMIODARONE HYDROCHLORIDE 200 MG: 200 TABLET ORAL at 09:32

## 2022-07-19 RX ADMIN — ATORVASTATIN CALCIUM 40 MG: 40 TABLET, FILM COATED ORAL at 21:27

## 2022-07-19 RX ADMIN — QUETIAPINE FUMARATE 50 MG: 25 TABLET ORAL at 21:27

## 2022-07-19 RX ADMIN — METOPROLOL TARTRATE 50 MG: 50 TABLET, FILM COATED ORAL at 21:27

## 2022-07-19 RX ADMIN — LEVOTHYROXINE SODIUM 100 MCG: 0.1 TABLET ORAL at 05:45

## 2022-07-19 RX ADMIN — ACETAMINOPHEN 325MG 650 MG: 325 TABLET ORAL at 17:50

## 2022-07-19 RX ADMIN — Medication 10 ML: at 21:29

## 2022-07-19 NOTE — PROGRESS NOTES
" [facility] - Progress Note    Josh Horan  1960  S458/1    07/19/22, 08:53 EDT      Chief Complaint: Following for post op management of BP, arrhythmias    Subjective:   Moved to tele - no new events or complaints.  No chest pain shortness of breath or palpitations.  Expresses worry over his heart rates - admits some contribution from anxiety.  Overall feels better.  Has been up walking     Review of Systems:  Pertinent positives are listed above and in physical exam.  All others have been reviewed and are negative.    acetaminophen, 650 mg, Oral, Q8H  amiodarone, 200 mg, Oral, Q8H   Followed by  [START ON 7/22/2022] amiodarone, 200 mg, Oral, Q12H   Followed by  [START ON 8/5/2022] amiodarone, 200 mg, Oral, Daily  aspirin, 325 mg, Oral, Daily  atorvastatin, 40 mg, Oral, Nightly  busPIRone, 10 mg, Oral, TID  DULoxetine, 60 mg, Oral, Daily  guaiFENesin, 600 mg, Oral, Q12H  heparin (porcine), 5,000 Units, Subcutaneous, Q8H  levothyroxine, 100 mcg, Oral, Q AM  metoprolol tartrate, 50 mg, Oral, Q12H  pantoprazole, 40 mg, Oral, Q AM  pharmacy consult - MTM, , Does not apply, Daily  polyethylene glycol, 17 g, Oral, Daily  QUEtiapine, 50 mg, Oral, Nightly  senna-docusate sodium, 2 tablet, Oral, BID  sodium chloride, 10 mL, Intravenous, Q12H  sodium chloride, 10 mL, Intravenous, Q8H        Objective:  Vitals:   height is 170.2 cm (67\") and weight is 93.8 kg (206 lb 14.4 oz). His oral temperature is 98.6 °F (37 °C). His blood pressure is 142/82 and his pulse is 71. His respiration is 16 and oxygen saturation is 93%.       Intake/Output Summary (Last 24 hours) at 7/19/2022 0852  Last data filed at 7/18/2022 1300  Gross per 24 hour   Intake 240 ml   Output --   Net 240 ml       Physical Exam:  General:  WN, NAD, A and O x3.  CV: Regular rate and rhythm no murmur, rub, or gallop.  Resp:  CTA Tay, equal, nonlabored.  Abd:  Soft, + BS, no organomegaly. Nontender to palpation.  Extrem:  No edema BLE, 2+ " pedal/PT pulses.            Results from last 7 days   Lab Units 07/19/22  0752 07/18/22  0237   WBC 10*3/mm3  --  11.44*   HEMOGLOBIN g/dL 11.2* 11.1*   HEMATOCRIT % 32.5* 32.1*   PLATELETS 10*3/mm3  --  258     Results from last 7 days   Lab Units 07/18/22  0320 07/17/22  0402 07/16/22  0421   SODIUM mmol/L 141   < > 139   POTASSIUM mmol/L 3.9   < > 4.2   CHLORIDE mmol/L 105   < > 102   CO2 mmol/L 27.0   < > 23.0   BUN mg/dL 15   < > 13   CREATININE mg/dL 0.70*   < > 0.74*   CALCIUM mg/dL 8.6   < > 8.9   BILIRUBIN mg/dL  --   --  0.8   ALK PHOS U/L  --   --  73   ALT (SGPT) U/L  --   --  8   AST (SGOT) U/L  --   --  13   GLUCOSE mg/dL 104*   < > 119*    < > = values in this interval not displayed.     Results from last 7 days   Lab Units 07/13/22  0208 07/12/22  1558   INR  1.20* 1.44*     Results from last 7 days   Lab Units 07/14/22  0259   TSH uIU/mL 0.219*   FREE T4 ng/dL 1.91*               Tele:  NSR    Assessment and Plan:  1.  Essential  Hypertension              -  Controlled currently              -  Continue Lopressor 25mg BID ordered, titrate as necessary     2.  Hyperlipidemia              -  Continue high intensity statin, appropriate diet     3.  Coronary Artery Disease              -  Presents with 3 months progressive dyspnea and chest pain.  MV CAD by [facility].              -  EF 50-55% by echo 4/2022              -  POD 7 p CABG, Dr. Anton:  LIMA-LAD, SVG-RCA, SVG- DX of LAD, SVG- OM1-OM2.  REGINA ligation with 35 atrial clip.              -  Continue support.     4.  Hypothyroidism              -  Currently on no supplementation.              -  Labs are abnormal.  Per Intensivist    5.  Post Op Atrial Fibrillation with RVR   -  Amiodarone protocol initiated.  Check EKG in a.m.   -  Continue to monitor.        62 yo CM with HTN, HLP, TAMIKO, hypothyroid, marijuana use, PTSD, Bipolar with progressive angina and dyspnea found to have MV CAD by [facility].  Now POD 5 after CABG with left atrial appendage  ligation, normal EF.  On BB/Amio per protocol for post op AFib.   No change medical therapy. Transferred to tele.    Home soon      Jeannine Robison PA-C working with Dr. Deandre Horowitz  07/19/22, 08:53 EDT

## 2022-07-19 NOTE — PROGRESS NOTES
[facility]  PROGRESS NOTE    Patient Name: Josh Horan  : 1960  MRN: [id number]    Date of Admission: 2022  Primary Care Physician: Jerrica Madrid APRN    Subjective   Subjective     CC:  Dyspnea    HPI:  Notes dyspnea on exertion. No cough or congestion. No f/c. Poor appetite. Constipation resolved. No dysuria. No n/v.    Review of Systems   Constitutional: Positive for activity change and fatigue.   HENT: Negative.    Respiratory: Positive for chest tightness and shortness of breath.    Cardiovascular: Negative.    Gastrointestinal: Negative.  Negative for constipation.   Genitourinary: Negative for difficulty urinating.         Objective   Objective     Vital Signs:   Temp:  [97.7 °F (36.5 °C)-98.6 °F (37 °C)] 98.6 °F (37 °C)  Heart Rate:  [64-91] 71  Resp:  [16-18] 16  BP: (126-156)/() 142/82     Physical Exam:  NAD, alert and oriented  OP clear, MMM  Neck supple  No LAD  RRR  CTAB  +BS, ND, NT, soft  HINES  Normal affect  Sternal incision with redness/drainage    Results Reviewed:  LAB RESULTS:      Lab 22  0752 22  0237 22  0402 22  0421 07/15/22  0118 22  0259 22  0208 22  1942 22  1558   WBC  --  11.44* 10.53 11.62* 13.13* 12.37* 10.92*   < > 8.26   HEMOGLOBIN 11.2* 11.1* 10.8* 10.2* 10.2* 9.7* 10.4*   < > 10.1*   HEMATOCRIT 32.5* 32.1* 32.0* 29.6* 29.4* 28.9* 30.1*   < > 28.5*   PLATELETS  --  258 231 185 151 141 176   < > 120*   NEUTROS ABS  --  6.64  --  7.38* 8.75* 8.73* 8.08*  --   --    IMMATURE GRANS (ABS)  --  0.05  --  0.05 0.06* 0.06* 0.04  --   --    LYMPHS ABS  --  2.98  --  2.63 2.69 2.21 1.45  --   --    MONOS ABS  --  1.01*  --  1.03* 1.22* 1.26* 1.06*  --   --    EOS ABS  --  0.68*  --  0.47* 0.33 0.08 0.23  --   --    MCV  --  89.2 89.6 90.0 88.3 90.0 87.8   < > 86.4   PROTIME  --   --   --   --   --   --  15.2*  --  17.5*   APTT  --   --   --   --   --   --   --   --  30.6    < >  = values in this interval not displayed.         Lab 07/19/22  0752 07/18/22  0320 07/17/22 0402 07/16/22  0421 07/15/22  0118 07/14/22  1533 07/14/22  0259 07/13/22  0208 07/12/22  1942 07/12/22  1558   SODIUM 138 141 140 139 136  --  138 140 141 140   POTASSIUM 3.9 3.9 3.6 4.2 4.2  --  3.9 4.0 3.7 3.6  3.6   CHLORIDE 103 105 102 102 101  --  103 107 106 107   CO2 24.0 27.0 26.0 23.0 26.0  --  28.0 25.0 25.0 22.0   ANION GAP 11.0 9.0 12.0 14.0 9.0  --  7.0 8.0 10.0 11.0   BUN 13 15 17 13 10  --  10 11 11 10   CREATININE 0.88 0.70* 0.79 0.74* 0.77  --  0.68* 0.78 0.80 0.73*   EGFR 99.1 106.1 102.3 104.4 103.1  --  107.1 102.7 101.9 104.8   GLUCOSE 118* 104* 109* 119* 139*  --  114* 133* 149* 130*   CALCIUM 9.0 8.6 9.0 8.9 9.0  --  8.4* 7.9* 8.1* 8.9   IONIZED CALCIUM  --   --   --   --   --   --   --   --   --  1.34*   MAGNESIUM  --   --   --  2.1 2.1  --  2.5 2.4 2.4 1.6   PHOSPHORUS  --   --  3.2 2.2* 2.7 2.2* 2.4* 2.4* 2.8 2.5   TSH  --   --   --   --   --   --  0.219*  --   --   --          Lab 07/17/22  0402 07/16/22  0421 07/15/22  0118 07/14/22  0259 07/13/22  0208   TOTAL PROTEIN  --  5.9* 6.2 6.0  --    ALBUMIN 3.40* 3.90 3.50 4.10 4.30   GLOBULIN  --  2.0 2.7 1.9  --    ALT (SGPT)  --  8 8 11  --    AST (SGOT)  --  13 22 36  --    BILIRUBIN  --  0.8 0.7 1.1  --    ALK PHOS  --  73 61 53  --          Lab 07/13/22  0208 07/12/22  1558   PROTIME 15.2* 17.5*   INR 1.20* 1.44*             Lab 07/12/22  0947   ABO TYPING B   RH TYPING Positive         Lab 07/13/22  0106 07/12/22  2149 07/12/22  1635   PH, ARTERIAL 7.392 7.283* 7.394   PCO2, ARTERIAL 40.1 52.4* 41.2   PO2 .0* 75.8* 226.0*   FIO2 40 40 100   HCO3 ART 24.4 24.8 25.1   BASE EXCESS ART -0.5* -2.1* 0.1   CARBOXYHEMOGLOBIN 1.0 1.1 0.9     Brief Urine Lab Results     None          Microbiology Results Abnormal     None          No radiology results from the last 24 hrs    Results for orders placed during the hospital encounter of  04/27/22    Adult Transthoracic Echo Complete W/ Cont if Necessary Per Protocol    Interpretation Summary  Technically adequate study.    1.  LV size and global LV systolic function are preserved.  Visually estimated ejection fraction is 50 to 55%.  3D ejection fraction is 52%.  Normal LV wall thickness.  Grade 1A diastolic dysfunction.  Mild left atrial enlargement.  Right heart chambers are normal.  No septal defect or intracavitary mass or thrombus.    2.  Valves are morphologically normal with no stenotic lesions or valve associated masses or thrombi.  Doppler excludes hemodynamically significant regurgitant lesions.    3.  No pericardial or great vessel pathology.    4.  Pulmonary artery pressures cannot be calculated.      I have reviewed the medications:  Scheduled Meds:acetaminophen, 650 mg, Oral, Q8H  amiodarone, 200 mg, Oral, Q8H   Followed by  [START ON 7/22/2022] amiodarone, 200 mg, Oral, Q12H   Followed by  [START ON 8/5/2022] amiodarone, 200 mg, Oral, Daily  aspirin, 325 mg, Oral, Daily  atorvastatin, 40 mg, Oral, Nightly  busPIRone, 10 mg, Oral, TID  DULoxetine, 60 mg, Oral, Daily  guaiFENesin, 600 mg, Oral, Q12H  heparin (porcine), 5,000 Units, Subcutaneous, Q8H  levothyroxine, 100 mcg, Oral, Q AM  metoprolol tartrate, 50 mg, Oral, Q12H  pantoprazole, 40 mg, Oral, Q AM  pharmacy consult - MTM, , Does not apply, Daily  polyethylene glycol, 17 g, Oral, Daily  QUEtiapine, 50 mg, Oral, Nightly  senna-docusate sodium, 2 tablet, Oral, BID  sodium chloride, 10 mL, Intravenous, Q12H  sodium chloride, 10 mL, Intravenous, Q8H      Continuous Infusions:   PRN Meds:.bisacodyl  •  bisacodyl  •  docusate sodium  •  HYDROcodone-acetaminophen  •  magnesium hydroxide  •  magnesium sulfate **OR** magnesium sulfate **OR** magnesium sulfate  •  Morphine  •  ondansetron  •  oxyCODONE-acetaminophen  •  potassium chloride **OR** potassium chloride  •  senna-docusate sodium  •  tiZANidine    Assessment & Plan   Assessment &  Plan     Active Hospital Problems    Diagnosis  POA   • **Coronary artery disease of native artery of native heart with stable angina pectoris (HCC) [I25.118]  Yes   • Postoperative atrial fibrillation (HCC) [I97.89, I48.91]  Yes   • Hypertension [I10]  Yes   • Hyperlipidemia [E78.5]  Yes   • Anxiety [F41.9]  Yes   • Acid reflux [K21.9]  Yes   • TAMIKO (obstructive sleep apnea) [G47.33]  Yes   • Depression [F32.A]  Yes   • Bipolar 1 disorder (HCC) [F31.9]  Yes   • PTSD (post-traumatic stress disorder) [F43.10]  Yes   • Marijuana use [F12.90]  Yes   • Hypothyroidism [E03.9]  Yes      Resolved Hospital Problems   No resolved problems to display.        Brief Hospital Course to date:  61 y.o.male with relevant PMH of HTN, HLP, GERD, marijuana use, hypothyroidism, TAMIKO not CPAP compliant, anxiety, PTSD, depression, bipolar DO, recently diagnosed MVCAD admitted 7/12/2022 post op 5vCABG by Dr. Anton.  Postoperative course complicated by atrial fibrillation.    CAD, with MVCAD, s/p CABG    Atrial fibrillation  -amiodarone    Constipation  -bowel regimen, improved    PNA  -gabapentin    Hx of Lyme disease  -on disability    Anx/Depression    Expected Discharge Location and Transportation: Home  Expected Discharge Date: 7/20    DVT prophylaxis:  Medical and mechanical DVT prophylaxis orders are present.     AM-PAC 6 Clicks Score (PT): 19 (07/18/22 1204)    CODE STATUS:   Code Status and Medical Interventions:   Ordered at: 07/12/22 6852     Code Status (Patient has no pulse and is not breathing):    CPR (Attempt to Resuscitate)     Medical Interventions (Patient has pulse or is breathing):    Full Support       Manjinder Ross MD  07/19/22

## 2022-07-19 NOTE — PROGRESS NOTES
[facility] Cardiothoracic Surgery In-Patient Progress Note     #7 Days Post-Op s/p CABG x5, LAAL     LOS: 7 days       Subjective  Denies shortness of breath but does endorse some incisional pain from sternal site. On room air saturations 96%. Ambulated twice.       Objective  Vital Signs  Temp:  [97.7 °F (36.5 °C)-98.6 °F (37 °C)] 98.6 °F (37 °C)  Heart Rate:  [64-91] 71  Resp:  [16-18] 16  BP: (126-156)/() 142/82      Physical Exam:   General Appearance: alert, appears stated age and cooperative   Lungs: respirations regular, respirations even and respirations unlabored   Heart: Controlled A. fib   Skin: Incision c/d/i   Sternum: Stable     Results   Results from last 7 days   Lab Units 07/18/22  0237   WBC 10*3/mm3 11.44*   HEMOGLOBIN g/dL 11.1*   HEMATOCRIT % 32.1*   PLATELETS 10*3/mm3 258     Results from last 7 days   Lab Units 07/18/22  0320   SODIUM mmol/L 141   POTASSIUM mmol/L 3.9   CHLORIDE mmol/L 105   CO2 mmol/L 27.0   BUN mg/dL 15   CREATININE mg/dL 0.70*   GLUCOSE mg/dL 104*   CALCIUM mg/dL 8.6     Imaging Results (Last 24 Hours)     ** No results found for the last 24 hours. **          Assessment    Coronary artery disease of native artery of native heart with stable angina pectoris (HCC)    Hypertension    Hyperlipidemia    Anxiety    Acid reflux    TAMIKO (obstructive sleep apnea)    Depression    Bipolar 1 disorder (HCC)    PTSD (post-traumatic stress disorder)    Marijuana use    Hypothyroidism    Postoperative atrial fibrillation (HCC)      Plan   Ambulate  Pulmonary Toilet: IS q1 hr while awake  Hopefully discharge tomorrow if cardiology is in agreement    Ling Jack, PRISCILLA  07/19/22  07:18 EDT

## 2022-07-20 ENCOUNTER — READMISSION MANAGEMENT (OUTPATIENT)
Dept: CALL CENTER | Facility: HOSPITAL | Age: 62
End: 2022-07-20

## 2022-07-20 VITALS
HEART RATE: 61 BPM | BODY MASS INDEX: 32.33 KG/M2 | RESPIRATION RATE: 18 BRPM | HEIGHT: 67 IN | DIASTOLIC BLOOD PRESSURE: 85 MMHG | TEMPERATURE: 98 F | WEIGHT: 206 LBS | SYSTOLIC BLOOD PRESSURE: 148 MMHG | OXYGEN SATURATION: 95 %

## 2022-07-20 LAB
ANION GAP SERPL CALCULATED.3IONS-SCNC: 10 MMOL/L (ref 5–15)
BUN SERPL-MCNC: 14 MG/DL (ref 6–20)
BUN/CREAT SERPL: 16.7 (ref 7–25)
CALCIUM SPEC-SCNC: 9 MG/DL (ref 8.6–10.5)
CHLORIDE SERPL-SCNC: 102 MMOL/L (ref 98–107)
CO2 SERPL-SCNC: 24 MMOL/L (ref 22–29)
CREAT SERPL-MCNC: 0.84 MG/DL (ref 0.76–1.27)
EGFRCR SERPLBLD CKD-EPI 2021: 100.5 ML/MIN/1.73
GLUCOSE SERPL-MCNC: 100 MG/DL (ref 65–99)
HCT VFR BLD AUTO: 33.1 % (ref 37.5–51)
HGB BLD-MCNC: 11.5 G/DL (ref 13–17.7)
POTASSIUM SERPL-SCNC: 4.4 MMOL/L (ref 3.5–5.2)
QT INTERVAL: 426 MS
QTC INTERVAL: 418 MS
SODIUM SERPL-SCNC: 136 MMOL/L (ref 136–145)

## 2022-07-20 PROCEDURE — 99024 POSTOP FOLLOW-UP VISIT: CPT

## 2022-07-20 PROCEDURE — 85014 HEMATOCRIT: CPT | Performed by: STUDENT IN AN ORGANIZED HEALTH CARE EDUCATION/TRAINING PROGRAM

## 2022-07-20 PROCEDURE — 80048 BASIC METABOLIC PNL TOTAL CA: CPT | Performed by: STUDENT IN AN ORGANIZED HEALTH CARE EDUCATION/TRAINING PROGRAM

## 2022-07-20 PROCEDURE — 85018 HEMOGLOBIN: CPT | Performed by: STUDENT IN AN ORGANIZED HEALTH CARE EDUCATION/TRAINING PROGRAM

## 2022-07-20 PROCEDURE — 99024 POSTOP FOLLOW-UP VISIT: CPT | Performed by: THORACIC SURGERY (CARDIOTHORACIC VASCULAR SURGERY)

## 2022-07-20 PROCEDURE — 93005 ELECTROCARDIOGRAM TRACING: CPT | Performed by: PHYSICIAN ASSISTANT

## 2022-07-20 PROCEDURE — 25010000002 HEPARIN (PORCINE) PER 1000 UNITS: Performed by: STUDENT IN AN ORGANIZED HEALTH CARE EDUCATION/TRAINING PROGRAM

## 2022-07-20 RX ORDER — HYDROCODONE BITARTRATE AND ACETAMINOPHEN 5; 325 MG/1; MG/1
1 TABLET ORAL EVERY 6 HOURS PRN
Qty: 15 TABLET | Refills: 0 | Status: SHIPPED | OUTPATIENT
Start: 2022-07-20 | End: 2022-08-04

## 2022-07-20 RX ORDER — LOSARTAN POTASSIUM 100 MG/1
50 TABLET ORAL DAILY
Qty: 30 TABLET | Refills: 3 | Status: SHIPPED | OUTPATIENT
Start: 2022-07-20

## 2022-07-20 RX ORDER — ATORVASTATIN CALCIUM 40 MG/1
40 TABLET, FILM COATED ORAL NIGHTLY
Qty: 90 TABLET | Refills: 3 | Status: SHIPPED | OUTPATIENT
Start: 2022-07-20

## 2022-07-20 RX ORDER — GUAIFENESIN 600 MG/1
600 TABLET, EXTENDED RELEASE ORAL 2 TIMES DAILY
Qty: 14 TABLET | Refills: 0 | Status: SHIPPED | OUTPATIENT
Start: 2022-07-20 | End: 2022-07-27

## 2022-07-20 RX ORDER — METOPROLOL TARTRATE 50 MG/1
50 TABLET, FILM COATED ORAL EVERY 12 HOURS SCHEDULED
Qty: 60 TABLET | Refills: 3 | Status: SHIPPED | OUTPATIENT
Start: 2022-07-20

## 2022-07-20 RX ORDER — ASPIRIN 325 MG
325 TABLET, DELAYED RELEASE (ENTERIC COATED) ORAL DAILY
Qty: 90 TABLET | Refills: 3 | Status: SHIPPED | OUTPATIENT
Start: 2022-07-21

## 2022-07-20 RX ORDER — AMIODARONE HYDROCHLORIDE 200 MG/1
200 TABLET ORAL DAILY
Qty: 90 TABLET | Refills: 3 | Status: SHIPPED | OUTPATIENT
Start: 2022-08-05

## 2022-07-20 RX ADMIN — METOPROLOL TARTRATE 50 MG: 50 TABLET, FILM COATED ORAL at 08:43

## 2022-07-20 RX ADMIN — SENNOSIDES AND DOCUSATE SODIUM 2 TABLET: 50; 8.6 TABLET ORAL at 08:42

## 2022-07-20 RX ADMIN — AMIODARONE HYDROCHLORIDE 200 MG: 200 TABLET ORAL at 08:43

## 2022-07-20 RX ADMIN — Medication 10 ML: at 06:19

## 2022-07-20 RX ADMIN — ACETAMINOPHEN 325MG 650 MG: 325 TABLET ORAL at 02:39

## 2022-07-20 RX ADMIN — DULOXETINE HYDROCHLORIDE 60 MG: 60 CAPSULE, DELAYED RELEASE ORAL at 08:43

## 2022-07-20 RX ADMIN — OXYCODONE HYDROCHLORIDE AND ACETAMINOPHEN 2 TABLET: 5; 325 TABLET ORAL at 13:36

## 2022-07-20 RX ADMIN — AMIODARONE HYDROCHLORIDE 200 MG: 200 TABLET ORAL at 00:46

## 2022-07-20 RX ADMIN — OXYCODONE HYDROCHLORIDE AND ACETAMINOPHEN 2 TABLET: 5; 325 TABLET ORAL at 08:42

## 2022-07-20 RX ADMIN — GUAIFENESIN 600 MG: 600 TABLET, EXTENDED RELEASE ORAL at 08:42

## 2022-07-20 RX ADMIN — PANTOPRAZOLE SODIUM 40 MG: 40 TABLET, DELAYED RELEASE ORAL at 05:54

## 2022-07-20 RX ADMIN — ASPIRIN 325 MG: 325 TABLET, COATED ORAL at 08:42

## 2022-07-20 RX ADMIN — LEVOTHYROXINE SODIUM 100 MCG: 0.1 TABLET ORAL at 05:54

## 2022-07-20 RX ADMIN — HEPARIN SODIUM 5000 UNITS: 5000 INJECTION INTRAVENOUS; SUBCUTANEOUS at 05:54

## 2022-07-20 RX ADMIN — BUSPIRONE HYDROCHLORIDE 10 MG: 10 TABLET ORAL at 08:42

## 2022-07-20 NOTE — PROGRESS NOTES
[facility] Cardiothoracic Surgery In-Patient Progress Note     #8 Days Post-Op s/p CABG x5, LAAL     LOS: 8 days       Subjective  Denies chest pain or shortness of breath.  On room air.    Objective  Vital Signs  Temp:  [96.4 °F (35.8 °C)-98.6 °F (37 °C)] 97.7 °F (36.5 °C)  Heart Rate:  [58-75] 66  Resp:  [16-18] 18  BP: (115-168)/(75-95) 118/76      Physical Exam:   General Appearance: alert, appears stated age and cooperative   Lungs: respirations regular, respirations even and respirations unlabored   Heart: RRR   Skin: Incision c/d/i   Sternum: Stable     Results     Results from last 7 days   Lab Units 07/20/22  0557 07/19/22  0752 07/18/22  0237   WBC 10*3/mm3  --   --  11.44*   HEMOGLOBIN g/dL 11.5*   < > 11.1*   HEMATOCRIT % 33.1*   < > 32.1*   PLATELETS 10*3/mm3  --   --  258    < > = values in this interval not displayed.     Results from last 7 days   Lab Units 07/19/22  0752   SODIUM mmol/L 138   POTASSIUM mmol/L 3.9   CHLORIDE mmol/L 103   CO2 mmol/L 24.0   BUN mg/dL 13   CREATININE mg/dL 0.88   GLUCOSE mg/dL 118*   CALCIUM mg/dL 9.0     Imaging Results (Last 24 Hours)     ** No results found for the last 24 hours. **          Assessment    Coronary artery disease of native artery of native heart with stable angina pectoris (HCC)    Hypertension    Hyperlipidemia    Anxiety    Acid reflux    TAMIKO (obstructive sleep apnea)    Depression    Bipolar 1 disorder (HCC)    PTSD (post-traumatic stress disorder)    Marijuana use    Hypothyroidism    Postoperative atrial fibrillation (HCC)      Plan   Ambulate  Pulmonary Toilet: IS q1 hr while awake  D/C today if cardiology is satisfied     PRISCILLA Rodriguez  07/20/22  07:14 EDT

## 2022-07-20 NOTE — CASE MANAGEMENT/SOCIAL WORK
Case Management Discharge Note      Final Note: Spoke with patient at bedside as he is up for discharge.  Patient denies any needs prior to discharge, and states that someone will be here around 2 pm to transport him home.         Selected Continued Care - Admitted Since 7/12/2022     Destination    No services have been selected for the patient.              Durable Medical Equipment    No services have been selected for the patient.              Dialysis/Infusion    No services have been selected for the patient.              Home Medical Care    No services have been selected for the patient.              Therapy    No services have been selected for the patient.              Community Resources    No services have been selected for the patient.              Community & DME    No services have been selected for the patient.                       Final Discharge Disposition Code: 01 - home or self-care

## 2022-07-20 NOTE — DISCHARGE SUMMARY
[facility] Cardiothoracic Surgery Discharge Summary    Name:  Josh Horan  MRN Number:  [id number]  Date of Admission: 7/12/2022  Date of Discharge:  7/20/2022    Referred By: Baldomero Anton MD  PCP: Jerrica Madrid APRN  IP Care Team:  Patient Care Team:  Jerrica Madrid APRN as PCP - General (Family Medicine)    Discharge Diagnosis:  Past Medical History:   Diagnosis Date   • Acid reflux    • Anxiety    • Arthritis    • Back pain    • Bipolar 1 disorder (HCC)    • Coronary artery disease    • Depression    • Headache    • Hiatal hernia    • Hyperlipidemia    • Hypertension    • Hyperthyroidism    • Hypothyroidism    • Lyme disease    • Migraine    • MRSA infection     1.5-2 years ago- back   • Panic attacks    • PTSD (post-traumatic stress disorder)    • Sciatic nerve pain    • Seasonal allergies    • Sleep apnea     doesnt use machine   • Tinnitus    • Wears glasses      Active Hospital Problems    Diagnosis  POA   • **Coronary artery disease of native artery of native heart with stable angina pectoris (HCC) [I25.118]  Yes   • Postoperative atrial fibrillation (HCC) [I97.89, I48.91]  Yes   • Hypertension [I10]  Yes   • Hyperlipidemia [E78.5]  Yes   • Anxiety [F41.9]  Yes   • Acid reflux [K21.9]  Yes   • TAMIKO (obstructive sleep apnea) [G47.33]  Yes   • Depression [F32.A]  Yes   • Bipolar 1 disorder (HCC) [F31.9]  Yes   • PTSD (post-traumatic stress disorder) [F43.10]  Yes   • Marijuana use [F12.90]  Yes   • Hypothyroidism [E03.9]  Yes      Resolved Hospital Problems   No resolved problems to display.     Coronary artery disease involving native heart, unspecified vessel or lesion type, unspecified whether angina present [I25.10]  Abnormal findings on diagnostic imaging of heart and coronary circulation [R93.1]  Coronary artery disease of native artery of native heart with stable angina pectoris (HCC) [I25.118]    Procedures Performed:  Procedure(s):  MEDIAN STERNOTOMY CORONARY ARTERY BYPASS GRAFTING X5 ,  UTILIZING THE LEFT INTERNAL MAMMERY GRAFT, ENDOSCOPIC VEIN HARVEST OF THE GREATER RIGHT SAPHENOUS VEIN WITH EXPLORATION OF THE LEFT LEG       History of Present Illness:    The patient is a 61-year-old male who was referred to me to evaluate for coronary bypass grafting surgery.  He has had a 3-month history of worsening shortness of breath and chest pain with activity. A catheterization demonstrated significant multivessel disease.  At this time in the office he is alert, conversant and pain-free.  He does have a history of Lyme disease and says this does affect his memory somewhat.    Hospital Course:  Patient was admitted to [facility] on 7/12/2022 for scheduled coronary artery bypass grafting x5 with EVH of the right greater saphenous vein and LAAL with Dr. Anton.  Patient was sent to ICU in stable condition was extubated per ICU protocol.  POD 1: No acute events. Ambulated 70 feet with physical therapy.   POD 2: Went into Afib overnight. Placed on amiodarone protocol. On 4L NC saturations 92%.   POD 3: Pacing wires were removed. Remains in Afib. He was rebolus with amiodarone per cardiology recommendations.  Ambulated 200 feet with physical therapy.  Highest heart rate was 141 with ambulation per PT note.  POD 4: Chest tubes were removed. Remains in Afib, management per cardiology. Oxygen was weaned to 2L NC.   POD 5: Continues to be in A. fib with rates in the 130s to 150s.  Beta-blocker was increased to 50 mg twice daily per cardiology.   POD 6: Remains in A. fib but rate controlled in the 80s. Remains on amiodarone protocol. Central line was removed. Transfer to telemetry orders were placed. Ambulated 440 feet with physical therapy.   POD 7: Transferred to telemetry floor. On room air. Remains in controlled A. fib.  POD 8: AM EKG revealed sinus rhythm. Patient was discharged home in stable condition.     Discharge Medications:     Discharge Medications      New Medications      Instructions  Start Date   amiodarone 200 MG tablet  Commonly known as: PACERONE   200 mg, Oral, Daily, Patient is to take 200mg twice daily until July 28th and then 200mg once daily thereafter.   Start Date: August 5, 2022     guaiFENesin 600 MG 12 hr tablet  Commonly known as: MUCINEX   600 mg, Oral, 2 Times Daily      HYDROcodone-acetaminophen 5-325 MG per tablet  Commonly known as: Norco   1 tablet, Oral, Every 6 Hours PRN      metoprolol tartrate 50 MG tablet  Commonly known as: LOPRESSOR   50 mg, Oral, Every 12 Hours Scheduled      pharmacy consult - MTM   Does not apply, Daily         Changes to Medications      Instructions Start Date   aspirin  MG tablet  What changed:   · medication strength  · how much to take   325 mg, Oral, Daily   Start Date: July 21, 2022     atorvastatin 40 MG tablet  Commonly known as: LIPITOR  What changed:   · medication strength  · how much to take  · when to take this   40 mg, Oral, Nightly      losartan 100 MG tablet  Commonly known as: COZAAR  What changed: how much to take   50 mg, Oral, Daily         Continue These Medications      Instructions Start Date   busPIRone 10 MG tablet  Commonly known as: BUSPAR   10 mg, Oral, 3 Times Daily      cetirizine 10 MG tablet  Commonly known as: zyrTEC   10 mg, Oral, Daily      DULoxetine 60 MG capsule  Commonly known as: CYMBALTA   60 mg, Oral, Daily      ibuprofen 800 MG tablet  Commonly known as: ADVIL,MOTRIN   800 mg, Oral, Every 6 Hours PRN      levothyroxine 100 MCG tablet  Commonly known as: SYNTHROID, LEVOTHROID   100 mcg, Oral, Daily      nitroglycerin 0.4 MG SL tablet  Commonly known as: NITROSTAT   1 under the tongue as needed for angina, may repeat q5mins for up three doses      omeprazole 40 MG capsule  Commonly known as: priLOSEC   40 mg, Oral, Daily      QUEtiapine 50 MG tablet  Commonly known as: SEROquel   50 mg, Oral, Nightly      tiZANidine 4 MG tablet  Commonly known as: ZANAFLEX   4 mg, Oral, 2 Times Daily PRN      vitamin  B-12 1000 MCG tablet  Commonly known as: CYANOCOBALAMIN   1,000 mcg, Oral, Daily      vitamin D 1.25 MG (83254 UT) capsule capsule  Commonly known as: ERGOCALCIFEROL   50,000 Units, Oral, Weekly         Stop These Medications    carvedilol 6.25 MG tablet  Commonly known as: COREG     PLAVIX PO            Allergies:  Allergies   Allergen Reactions   • Adhesive Tape Rash       Discharge Diet:  Diet Instructions     Diet: Consistent Carbohydrate, Cardiac      Discharge Diet:  Consistent Carbohydrate  Cardiac               Activity at Discharge:  Activity Instructions     Activity as Tolerated      Bathing Restrictions      Type of Restriction: Bathing    Bathing Restrictions: No Tub Bath    Driving Restrictions      Type of Restriction: Driving    Driving Restrictions: No Driving While Taking Narcotics    Lifting Restrictions      Type of Restriction: Lifting    Lifting Restrictions: Lifting Restriction (Indicate Limit)    Weight Limit (Pounds): 10    Length of Lifting Restriction: until seen at next follow-up appointment          Discharge Disposition  Home or Self Care    Discharge Education:  Post-Op Education:  Patient was advised on responsible use of opioids in the post-surgical setting.  Patient was advised these medications are highly addictive.  Patient advised on proper disposal of unused opioid medication and was urged to discard any unused opioid medication. I reviewed the patient's sternal precautions and outlined the physical activity suitable for each benchmark at the 6-week 3-month and 1 year intervals.  Wound Care:  Patient educated regarding care of post-operative wounds.  Patient is to wash with plain soap and water and pat dry.  Patient is not to use salves on the incision sites.  Patient instructed regarding the signs and symptoms of infection and when to call the office with any concerns.    Special Instructions:   1.  Keep incisions clean and dry at all times. Take a shower daily. Do not take a  tub bath or use hot tubs until seen by the cardiac surgeon in your follow-up visit. Clean  your body and incisions daily with Dial soap and water. Always use a clean washcloth. Do not scrub your incision(s). Do not re-use dressings on your incision(s). Do not use any lotions, creams, oils, powders, antibiotic ointment (i.e., Neosporin), peroxide, alcohol, or iodine UNLESS told to do by the cardiac surgeon.  Patient may shower, but no tub baths until CT Surgery followup.   2.  No lifting over 10 lbs until CT Surgery follow up.  3.  No driving until released to do so by the surgeon.      Surgical Leg Precautions:   -Avoid sitting in one position or standing for long periods of time.  -Do not cross your legs.  -Keep your legs elevated while sitting  -Do not use any lotions, creams, oils, powders, antibiotic ointment (i.e. Neosporin), peroxide, alcohol, or iodine unless specifically prescribed by the cardiac surgeon.  -If elastic stockings (DEMAR hose) were prescribed to you, wear the stockings while you are up for at least two weeks after discharge. The stockings help decrease swelling. However, remove stockings at bedtime. Wash the stockings with mild soap and water, and make sure they are completely dry before you put them back on.    When to Call the Cardiac Surgeon:  -Increased swelling, redness, tenderness, and drainage  -Increased warmth in the skin around an incision  -Large amount of clear or pinkish drainage  -Sudden increased amount of drainage  -White, yellow, or greenish drainage  -Odor, which may be foul or sweet, coming from an incision  -An opening in your incisions  -Temperature higher than 101 degrees Fahrenheit   -Temperature higher than 100 degrees Fahrenheit two times within 24 hours  -Do not hesitate to call the cardiac surgery nurse navigator or cardiac surgeon if you have concerns or questions.     *The [facility] cardiac surgery nurse navigator is available Monday-Friday 8 a.m. to  4:30 p.m. [phone]  Call 911:  -Chest pain (pain similar to what you experienced prior to surgery)  -Fainting spells  -Bright red stool  -Vomiting bright red blood  -Shortness of breath not relieved by rest  -Sudden numbness or weakness in arms or legs  -Sudden, severe headache  -Heart rate faster than 150 beats/minute with shortness of breath or a new irregular heart rate      Follow-up Appointments  Future Appointments   Date Time Provider Department Center   8/23/2022 10:30 AM Josh Kennedy PA [facility]   9/22/2022  1:15 PM Josh Kennedy PA [facility]     Additional Instructions for the Follow-ups that You Need to Schedule     Call MD With Problems / Concerns   As directed      Instructions:   Please call the CT Surgery office with any questions or concerns you may have [phone]    Order Comments: Instructions: Please call the CT Surgery office with any questions or concerns you may have [phone]          Discharge Follow-up with PCP   As directed       Currently Documented PCP:    Jerrica Madrid APRN    PCP Phone Number:    [phone]     Follow Up Details: Follow Up With PCP Within 7-14 Days         Discharge Follow-up with Specialty: Cardiology; 1 Month   As directed      Specialty: Cardiology    Follow Up: 1 Month    Follow Up Details: Follow-up with cardiology Dr. Salguero in 1 month with EKG         Discharge Follow-up with Specialty: Cardiothoracic Surgery   As directed      Follow Up Details: Follow Up in 2-4 Weeks    Specialty: Cardiothoracic Surgery         Discharge Follow-up with Specialty: [facility]ve [facility]; 1 Week   As directed      Specialty: [facility]    Follow Up: 1 Week    Follow Up Details: Follow Up With [facility] Within 7 Days of Discharge. If Discharged on a Weekend, Schedule Follow Up For The Following Friday at 0900.         Cardiac Rehab Evaluation and Enrollment   Jul 25, 2022      Reason for Consult?: Education                   Ling Jack, PRISCILLA  07/20/22  10:33 EDT    Time: I spent 35 minutes on this discharge activity which included: face-to-face encounter with the patient, reviewing the data in the system, coordination of the care with the nursing staff as well as consultants, documentation, and entering orders.

## 2022-07-20 NOTE — PROGRESS NOTES
" [facility] - Progress Note    Josh Horan  1960  [id number]    07/20/22, 09:26 EDT      Chief Complaint: Following for post op management of BP, arrhythmias    Subjective:   Awake in bed.  Ready to go home.  Feels good - spirit is good.  No pain or dyspnea.    Review of Systems:  Pertinent positives are listed above and in physical exam.  All others have been reviewed and are negative.    acetaminophen, 650 mg, Oral, Q8H  amiodarone, 200 mg, Oral, Q8H   Followed by  [START ON 7/22/2022] amiodarone, 200 mg, Oral, Q12H   Followed by  [START ON 8/5/2022] amiodarone, 200 mg, Oral, Daily  aspirin, 325 mg, Oral, Daily  atorvastatin, 40 mg, Oral, Nightly  busPIRone, 10 mg, Oral, TID  DULoxetine, 60 mg, Oral, Daily  guaiFENesin, 600 mg, Oral, Q12H  heparin (porcine), 5,000 Units, Subcutaneous, Q8H  levothyroxine, 100 mcg, Oral, Q AM  metoprolol tartrate, 50 mg, Oral, Q12H  pantoprazole, 40 mg, Oral, Q AM  pharmacy consult - MTM, , Does not apply, Daily  polyethylene glycol, 17 g, Oral, Daily  QUEtiapine, 50 mg, Oral, Nightly  senna-docusate sodium, 2 tablet, Oral, BID  sodium chloride, 10 mL, Intravenous, Q12H  sodium chloride, 10 mL, Intravenous, Q8H        Objective:  Vitals:   height is 170.2 cm (67\") and weight is 93.4 kg (206 lb). His oral temperature is 98 °F (36.7 °C). His blood pressure is 148/85 and his pulse is 69. His respiration is 18 and oxygen saturation is 95%.       Intake/Output Summary (Last 24 hours) at 7/20/2022 0926  Last data filed at 7/19/2022 1924  Gross per 24 hour   Intake 350 ml   Output --   Net 350 ml       Physical Exam:  General:  WN, NAD, A and O x3.  CV: Regular rate and rhythm no murmur, rub, or gallop.  Resp:  CTA Tay, equal, nonlabored.  Abd:  Soft, + BS, no organomegaly. Nontender to palpation.  Extrem:  No edema BLE, 2+ pedal/PT pulses.            Results from last 7 days   Lab Units 07/20/22  0557 07/19/22  0752 07/18/22  0237   WBC 10*3/mm3  --   --  11.44* "   HEMOGLOBIN g/dL 11.5*   < > 11.1*   HEMATOCRIT % 33.1*   < > 32.1*   PLATELETS 10*3/mm3  --   --  258    < > = values in this interval not displayed.     Results from last 7 days   Lab Units 07/20/22  0557 07/17/22  0402 07/16/22  0421   SODIUM mmol/L 136   < > 139   POTASSIUM mmol/L 4.4   < > 4.2   CHLORIDE mmol/L 102   < > 102   CO2 mmol/L 24.0   < > 23.0   BUN mg/dL 14   < > 13   CREATININE mg/dL 0.84   < > 0.74*   CALCIUM mg/dL 9.0   < > 8.9   BILIRUBIN mg/dL  --   --  0.8   ALK PHOS U/L  --   --  73   ALT (SGPT) U/L  --   --  8   AST (SGOT) U/L  --   --  13   GLUCOSE mg/dL 100*   < > 119*    < > = values in this interval not displayed.         Results from last 7 days   Lab Units 07/14/22  0259   TSH uIU/mL 0.219*   FREE T4 ng/dL 1.91*               Tele:  NSR/SB    Assessment and Plan:  1.  Essential  Hypertension              -  Controlled currently              -  Continue Lopressor 25mg BID ordered, titrate as necessary     2.  Hyperlipidemia              -  Continue high intensity statin, appropriate diet     3.  Coronary Artery Disease              -  Presents with 3 months progressive dyspnea and chest pain.  MV CAD by [facility].              -  EF 50-55% by echo 4/2022              -  POD 8 p CABG, Dr. Anton:  LIMA-LAD, SVG-RCA, SVG- DX of LAD, SVG- OM1-OM2.  REGINA ligation with 35 atrial clip.              -  Continue support.     4.  Hypothyroidism              -  Currently on no supplementation.              -  Labs are abnormal.  Per Intensivist    5.  Post Op Atrial Fibrillation with RVR   -  Amiodarone protocol initiated.  EKG reviewed.   -  Continue to monitor.        60 yo CM with HTN, HLP, TAMIKO, hypothyroid, marijuana use, PTSD, Bipolar with progressive angina and dyspnea found to have MV CAD by [facility].  Now POD 5 after CABG with left atrial appendage ligation, normal EF.  On BB/Amio per protocol for post op AFib.   No change medical therapy.   Okay for discharge from cardiology standpoint.  Follow up  with Dr. Salguero in 1 month with EKG after discharge.    Jeannine Robison PA-C working with Dr. Deandre Horowitz  07/20/22, 09:27 EDT

## 2022-07-21 NOTE — OUTREACH NOTE
Prep Survey    Flowsheet Row Responses   [facility] facility patient discharged from? [facility]   Is LACE score < 7 ? No   Emergency Room discharge w/ pulse ox? No   Eligibility Readm Mgmt   Discharge diagnosis CORONARY ARTERY BYPASS GRAFTING X5    Does the patient have one of the following disease processes/diagnoses(primary or secondary)? Cardiothoracic surgery   Does the patient have Home health ordered? No   Is there a DME ordered? No   Prep survey completed? Yes          NUSRAT GREWAL - Registered Nurse

## 2022-08-01 ENCOUNTER — NURSE TRIAGE (OUTPATIENT)
Dept: CALL CENTER | Facility: HOSPITAL | Age: 62
End: 2022-08-01

## 2022-08-01 ENCOUNTER — READMISSION MANAGEMENT (OUTPATIENT)
Dept: CALL CENTER | Facility: HOSPITAL | Age: 62
End: 2022-08-01

## 2022-08-01 NOTE — OUTREACH NOTE
CT Surgery Week 2 Survey    Flowsheet Row Responses   [facility] [place] patient discharged from? [facility]   Does the patient have one of the following disease processes/diagnoses(primary or secondary)? Cardiothoracic surgery   Week 2 attempt successful? Yes  [pt's phone can't receive calls in.]   Call start time 1657   Call end time 1702   Discharge diagnosis CORONARY ARTERY BYPASS GRAFTING X5    Is patient permission given to speak with other caregiver? Yes   List who call center can speak with otilia   Person spoke with today (if not patient) and relationship friend- Otilia   Teir reviewed with patient/caregiver? Yes   Is the patient having any side effects they believe may be caused by any medication additions or changes? No   Does the patient have all medications related to this admission filled (includes all antibiotics, pain medications, cardiac medications, etc.) Yes   Is the patient taking all medications as directed (includes completed medication regime)? Yes   Does the patient have a primary care provider?  Yes   Does the patient have an appointment scheduled with their C/T surgeon? Yes   Has the patient kept scheduled appointments due by today? N/A   Psychosocial issues? No   Comments Pt told friend that his HR went into the 40's yesterday. Will call MD or return to ER if happens again.    Did the patient receive a copy of their discharge instructions? Yes   Nursing interventions Reviewed instructions with patient   What is the patient's perception of their health status since discharge? Improving   Nursing interventions Nurse provided patient education   Nursing interventions Reassured on normal signs of recovery   Is the patient /caregiver able to teach back basic post-op care? Practice 'cough and deep breath', Continue use of incentive spirometry at least 1 week post discharge, Keep incision areas clean, dry and protected, Do not remove steri-strips   Is the patient/caregiver able to teach back signs  and symptoms of incisional infection? Increased redness, swelling or pain at the incisonal site   Is the patient/caregiver able to teach back steps to recovery at home? Rest and rebuild strength, gradually increase activity   If the patient is a current smoker, are they able to teach back resources for cessation? Not a smoker   Is the patient/caregiver able to teach back the hierarchy of who to call/visit for symptoms/problems? PCP, Specialist, Home health nurse, Urgent Care, ED, 911 Yes   Additional teach back comments smokes marijuana nightly to help eat and sleep, per friend.   Week 2 call completed? Yes          MARBIN WAGONER - Registered Nurse

## 2022-08-01 NOTE — TELEPHONE ENCOUNTER
"Bradycardia calling provider and will be coming to [facility] of choice if no answer    Reason for Disposition  • Patient sounds very sick or weak to the triager    Additional Information  • Negative: Passed out (i.e., lost consciousness, collapsed and was not responding)  • Negative: Shock suspected (e.g., cold/pale/clammy skin, too weak to stand, low BP, rapid pulse)  • Negative: Difficult to awaken or acting confused (e.g., disoriented, slurred speech)  • Negative: Visible sweat on face or sweat dripping down face  • Negative: Unable to walk, or can only walk with assistance (e.g., requires support)  • Negative: [1] Received SHOCK from implantable cardiac defibrillator AND [2] persisting symptoms (i.e., palpitations, lightheadedness)  • Negative: [1] Dizziness, lightheadedness, or weakness AND [2] heart beating very rapidly (e.g., > 140 / minute)  • Negative: [1] Dizziness, lightheadedness, or weakness AND [2] heart beating very slowly  (e.g., < 50 / minute)  • Negative: Sounds like a life-threatening emergency to the triager  • Negative: Chest pain  • Negative: Implantable Cardiac Defibrillator (ICD) or a pacemaker symptoms or questions  • Negative: Difficulty breathing  • Negative: Dizziness, lightheadedness, or weakness  • Negative: [1] Heart beating very rapidly (e.g., > 140 / minute) AND [2] present now  (Exception: during exercise)  • Negative: Heart beating very slowly (e.g., < 50 / minute)  (Exception: athlete and heart rate normal for caller)  • Negative: New or worsened shortness of breath with activity (dyspnea on exertion)    Answer Assessment - Initial Assessment Questions  1. DESCRIPTION: \"Please describe your heart rate or heartbeat that you are having\" (e.g., fast/slow, regular/irregular, skipped or extra beats, \"palpitations\")  slow  2. ONSET: \"When did it start?\" (Minutes, hours or days)    today  3. DURATION: \"How long does it last\" (e.g., seconds, minutes, hours)      now  4. PATTERN \"Does it come and " "go, or has it been constant since it started?\"  \"Does it get worse with exertion?\"   \"Are you feeling it now?\"      Constant. Feeling tire  5. TAP: \"Using your hand, can you tap out what you are feeling on a chair or table in front of you, so that I can hear?\" (Note: not all patients can do this)        45  6. HEART RATE: \"Can you tell me your heart rate?\" \"How many beats in 15 seconds?\"  (Note: not all patients can do this)        45  7. RECURRENT SYMPTOM: \"Have you ever had this before?\" If Yes, ask: \"When was the last time?\" and \"What happened that time?\"       na  8. CAUSE: \"What do you think is causing the palpitations?\"     na  9. CARDIAC HISTORY: \"Do you have any history of heart disease?\" (e.g., heart attack, angina, bypass surgery, angioplasty, arrhythmia)    cabg  10. OTHER SYMPTOMS: \"Do you have any other symptoms?\" (e.g., dizziness, chest pain, sweating, difficulty breathing)        Feels tired  11. PREGNANCY: \"Is there any chance you are pregnant?\" \"When was your last menstrual period?\"        na    Protocols used: HEART RATE AND HEARTBEAT QUESTIONS-ADULT-AH      "

## 2022-08-02 ENCOUNTER — TELEPHONE (OUTPATIENT)
Dept: CARDIAC SURGERY | Facility: CLINIC | Age: 62
End: 2022-08-02

## 2022-08-02 NOTE — TELEPHONE ENCOUNTER
Patient's wife Otilia left a voice mail last night saying his heart rate is dropping into the 40's.  He did some walking last night, and HR did go up in the 50's.  I advised her she needs to contact Josh Kennedy PA-C' office this morning.

## 2022-08-04 ENCOUNTER — LAB (OUTPATIENT)
Dept: CARDIOLOGY | Facility: CLINIC | Age: 62
End: 2022-08-04

## 2022-08-04 ENCOUNTER — OFFICE VISIT (OUTPATIENT)
Dept: CARDIOLOGY | Facility: CLINIC | Age: 62
End: 2022-08-04

## 2022-08-04 VITALS
BODY MASS INDEX: 33.46 KG/M2 | WEIGHT: 213.2 LBS | HEART RATE: 48 BPM | DIASTOLIC BLOOD PRESSURE: 71 MMHG | SYSTOLIC BLOOD PRESSURE: 111 MMHG | OXYGEN SATURATION: 98 % | HEIGHT: 67 IN

## 2022-08-04 DIAGNOSIS — I10 PRIMARY HYPERTENSION: ICD-10-CM

## 2022-08-04 DIAGNOSIS — R06.02 SHORTNESS OF BREATH: ICD-10-CM

## 2022-08-04 DIAGNOSIS — I25.118 CORONARY ARTERY DISEASE OF NATIVE ARTERY OF NATIVE HEART WITH STABLE ANGINA PECTORIS: Primary | ICD-10-CM

## 2022-08-04 DIAGNOSIS — R94.30 EJECTION FRACTION < 50%: ICD-10-CM

## 2022-08-04 DIAGNOSIS — I25.118 CORONARY ARTERY DISEASE OF NATIVE ARTERY OF NATIVE HEART WITH STABLE ANGINA PECTORIS: ICD-10-CM

## 2022-08-04 LAB
ALBUMIN SERPL-MCNC: 4.35 G/DL (ref 3.5–5.2)
ALBUMIN/GLOB SERPL: 1.6 G/DL
ALP SERPL-CCNC: 126 U/L (ref 39–117)
ALT SERPL W P-5'-P-CCNC: 9 U/L (ref 1–41)
ANION GAP SERPL CALCULATED.3IONS-SCNC: 12.5 MMOL/L (ref 5–15)
AST SERPL-CCNC: 11 U/L (ref 1–40)
BASOPHILS # BLD AUTO: 0.11 10*3/MM3 (ref 0–0.2)
BASOPHILS NFR BLD AUTO: 1 % (ref 0–1.5)
BILIRUB SERPL-MCNC: 0.3 MG/DL (ref 0–1.2)
BUN SERPL-MCNC: 16 MG/DL (ref 6–20)
BUN/CREAT SERPL: 16.2 (ref 7–25)
CALCIUM SPEC-SCNC: 9.4 MG/DL (ref 8.6–10.5)
CHLORIDE SERPL-SCNC: 102 MMOL/L (ref 98–107)
CO2 SERPL-SCNC: 22.5 MMOL/L (ref 22–29)
CREAT SERPL-MCNC: 0.99 MG/DL (ref 0.76–1.27)
D DIMER PPP FEU-MCNC: 2.91 MCGFEU/ML (ref 0–0.5)
DEPRECATED RDW RBC AUTO: 46.8 FL (ref 37–54)
EGFRCR SERPLBLD CKD-EPI 2021: 87.8 ML/MIN/1.73
EOSINOPHIL # BLD AUTO: 0.95 10*3/MM3 (ref 0–0.4)
EOSINOPHIL NFR BLD AUTO: 8.6 % (ref 0.3–6.2)
ERYTHROCYTE [DISTWIDTH] IN BLOOD BY AUTOMATED COUNT: 14 % (ref 12.3–15.4)
GLOBULIN UR ELPH-MCNC: 2.7 GM/DL
GLUCOSE SERPL-MCNC: 106 MG/DL (ref 65–99)
HCT VFR BLD AUTO: 39.7 % (ref 37.5–51)
HGB BLD-MCNC: 12.9 G/DL (ref 13–17.7)
IMM GRANULOCYTES # BLD AUTO: 0.03 10*3/MM3 (ref 0–0.05)
IMM GRANULOCYTES NFR BLD AUTO: 0.3 % (ref 0–0.5)
LYMPHOCYTES # BLD AUTO: 3.32 10*3/MM3 (ref 0.7–3.1)
LYMPHOCYTES NFR BLD AUTO: 30 % (ref 19.6–45.3)
MCH RBC QN AUTO: 30.2 PG (ref 26.6–33)
MCHC RBC AUTO-ENTMCNC: 32.5 G/DL (ref 31.5–35.7)
MCV RBC AUTO: 93 FL (ref 79–97)
MONOCYTES # BLD AUTO: 0.64 10*3/MM3 (ref 0.1–0.9)
MONOCYTES NFR BLD AUTO: 5.8 % (ref 5–12)
NEUTROPHILS NFR BLD AUTO: 54.3 % (ref 42.7–76)
NEUTROPHILS NFR BLD AUTO: 6 10*3/MM3 (ref 1.7–7)
NRBC BLD AUTO-RTO: 0 /100 WBC (ref 0–0.2)
PLATELET # BLD AUTO: 349 10*3/MM3 (ref 140–450)
PMV BLD AUTO: 10.5 FL (ref 6–12)
POTASSIUM SERPL-SCNC: 4.9 MMOL/L (ref 3.5–5.2)
PROT SERPL-MCNC: 7 G/DL (ref 6–8.5)
RBC # BLD AUTO: 4.27 10*6/MM3 (ref 4.14–5.8)
SODIUM SERPL-SCNC: 137 MMOL/L (ref 136–145)
TSH SERPL DL<=0.05 MIU/L-ACNC: 16.19 UIU/ML (ref 0.27–4.2)
WBC NRBC COR # BLD: 11.05 10*3/MM3 (ref 3.4–10.8)

## 2022-08-04 PROCEDURE — 99214 OFFICE O/P EST MOD 30 MIN: CPT | Performed by: PHYSICIAN ASSISTANT

## 2022-08-04 PROCEDURE — 85025 COMPLETE CBC W/AUTO DIFF WBC: CPT | Performed by: PHYSICIAN ASSISTANT

## 2022-08-04 PROCEDURE — 80053 COMPREHEN METABOLIC PANEL: CPT | Performed by: PHYSICIAN ASSISTANT

## 2022-08-04 PROCEDURE — 84443 ASSAY THYROID STIM HORMONE: CPT | Performed by: PHYSICIAN ASSISTANT

## 2022-08-04 PROCEDURE — 36415 COLL VENOUS BLD VENIPUNCTURE: CPT

## 2022-08-04 PROCEDURE — 85379 FIBRIN DEGRADATION QUANT: CPT | Performed by: PHYSICIAN ASSISTANT

## 2022-08-04 RX ORDER — ESZOPICLONE 1 MG/1
1 TABLET, FILM COATED ORAL NIGHTLY
COMMUNITY

## 2022-08-04 NOTE — PROGRESS NOTES
Problem list     Subjective   Josh Horan is a 61 y.o. male     Chief Complaint   Patient presents with   • Follow-up   Problem list:  1.  Coronary artery disease.  1.1.  Coronary artery bypass graft performed, 7/12/2022.  The patient was grafted with LIMA to LAD, vein graft to the right, vein graft to the diagonal, and vein graft sequenced between OM1 and OM 2.  The patient had concurrent ligation of the left atrial appendage with size 35 atrial clip.  2.  Postop A. fib.  3.  Hypertension  4.  Dyslipidemia  5.  Hypothyroidism.  6.  Bipolar disorder.    HPI  The patient presents in for follow-up status post recent coronary artery bypass grafting.  He had graft anatomy as outlined above.  He has done fairly well postoperatively.  Incision sites appear to be healing well.  He has chest wall tenderness but no angina.  His dyspnea is persistent, and may be slightly worse over the last few days.  Exam does not suggest failure.  This appears to be improving.  He has no failure nor dysrhythmic symptoms at this time.  He had postop A. fib which has been controlled at this time with amiodarone.  He feels well on that therapy.  The patient appears to be making appropriate improvement in the post bypass setting.      Current Outpatient Medications on File Prior to Visit   Medication Sig Dispense Refill   • amiodarone (PACERONE) 200 MG tablet Take 1 tablet by mouth Daily. Patient is to take 200mg twice daily until July 28th and then 200mg once daily thereafter. 90 tablet 3   • aspirin  MG tablet Take 1 tablet by mouth Daily. 90 tablet 3   • atorvastatin (LIPITOR) 40 MG tablet Take 1 tablet by mouth Every Night. 90 tablet 3   • busPIRone (BUSPAR) 10 MG tablet Take 10 mg by mouth 3 (Three) Times a Day.     • cetirizine (zyrTEC) 10 MG tablet Take 10 mg by mouth Daily.     • DULoxetine (CYMBALTA) 60 MG capsule Take 60 mg by mouth Daily.     • eszopiclone (Lunesta) 1 MG tablet Take 1 mg by mouth Every Night. Take immediately  before bedtime     • ibuprofen (ADVIL,MOTRIN) 800 MG tablet Take 800 mg by mouth Every 6 (Six) Hours As Needed for Mild Pain .     • levothyroxine (SYNTHROID, LEVOTHROID) 100 MCG tablet Take 100 mcg by mouth Daily.     • losartan (COZAAR) 100 MG tablet Take 0.5 tablets by mouth Daily. 30 tablet 3   • metoprolol tartrate (LOPRESSOR) 50 MG tablet Take 1 tablet by mouth Every 12 (Twelve) Hours. 60 tablet 3   • nitroglycerin (NITROSTAT) 0.4 MG SL tablet 1 under the tongue as needed for angina, may repeat q5mins for up three doses 25 tablet 3   • omeprazole (priLOSEC) 40 MG capsule Take 40 mg by mouth Daily.     • pharmacy consult - MTM Daily.     • tiZANidine (ZANAFLEX) 4 MG tablet Take 4 mg by mouth 2 (Two) Times a Day As Needed for Muscle Spasms.     • vitamin B-12 (CYANOCOBALAMIN) 1000 MCG tablet Take 1,000 mcg by mouth Daily.     • vitamin D (ERGOCALCIFEROL) 1.25 MG (63224 UT) capsule capsule Take 50,000 Units by mouth 1 (One) Time Per Week.       No current facility-administered medications on file prior to visit.       Adhesive tape    Past Medical History:   Diagnosis Date   • Acid reflux    • Anxiety    • Arthritis    • Back pain    • Bipolar 1 disorder (HCC)    • Coronary artery disease    • Depression    • Headache    • Hiatal hernia    • Hyperlipidemia    • Hypertension    • Hyperthyroidism    • Hypothyroidism    • Lyme disease    • Migraine    • MRSA infection     1.5-2 years ago- back   • Panic attacks    • PTSD (post-traumatic stress disorder)    • Sciatic nerve pain    • Seasonal allergies    • Sleep apnea     doesnt use machine   • Tinnitus    • Wears glasses        Social History     Socioeconomic History   • Marital status: Single   • Number of children: 2   Tobacco Use   • Smoking status: Never Smoker   • Smokeless tobacco: Never Used   Vaping Use   • Vaping Use: Never used   Substance and Sexual Activity   • Alcohol use: Never   • Drug use: Yes     Frequency: 7.0 times per week     Types: Marijuana  "  • Sexual activity: Defer       Family History   Problem Relation Age of Onset   • Hypertension Mother    • Heart disease Mother    • Cancer Mother    • Heart disease Father    • Heart attack Father        Review of Systems   Constitutional: Positive for fatigue. Negative for chills and fever.   HENT: Negative for congestion, rhinorrhea and sore throat.    Eyes: Positive for visual disturbance (glasses).   Respiratory: Positive for chest tightness. Negative for shortness of breath and wheezing.    Cardiovascular: Positive for chest pain and palpitations. Negative for leg swelling.   Gastrointestinal: Negative.    Endocrine: Negative.    Genitourinary: Negative.    Musculoskeletal: Positive for back pain and neck pain. Negative for arthralgias.   Skin: Negative.  Negative for rash and wound.   Allergic/Immunologic: Negative.  Negative for environmental allergies.   Neurological: Positive for dizziness and headaches. Negative for weakness and numbness.   Hematological: Negative.  Does not bruise/bleed easily.   Psychiatric/Behavioral: Positive for sleep disturbance (cpap).       Objective   Vitals:    08/04/22 1554   BP: 111/71   BP Location: Left arm   Patient Position: Sitting   Pulse: (!) 48   SpO2: 98%   Weight: 96.7 kg (213 lb 3.2 oz)   Height: 170.2 cm (67\")      /71 (BP Location: Left arm, Patient Position: Sitting)   Pulse (!) 48   Ht 170.2 cm (67\")   Wt 96.7 kg (213 lb 3.2 oz)   SpO2 98%   BMI 33.39 kg/m²    Lab Results (most recent)     None        Physical Exam  Vitals and nursing note reviewed.   Constitutional:       General: He is not in acute distress.     Appearance: He is well-developed.   HENT:      Head: Normocephalic and atraumatic.   Eyes:      Conjunctiva/sclera: Conjunctivae normal.      Pupils: Pupils are equal, round, and reactive to light.   Neck:      Vascular: No JVD.      Trachea: No tracheal deviation.   Cardiovascular:      Rate and Rhythm: Normal rate and regular rhythm.     "  Heart sounds: Normal heart sounds.   Pulmonary:      Effort: Pulmonary effort is normal.      Breath sounds: Normal breath sounds.   Abdominal:      General: Bowel sounds are normal. There is no distension.      Palpations: Abdomen is soft. There is no mass.      Tenderness: There is no abdominal tenderness. There is no guarding or rebound.   Musculoskeletal:         General: No tenderness or deformity. Normal range of motion.      Cervical back: Normal range of motion and neck supple.   Skin:     General: Skin is warm and dry.      Coloration: Skin is not pale.      Findings: No erythema or rash.   Neurological:      Mental Status: He is alert and oriented to person, place, and time.   Psychiatric:         Behavior: Behavior normal.         Thought Content: Thought content normal.         Judgment: Judgment normal.           Procedure   Procedures       Assessment & Plan      Diagnosis Plan   1. Coronary artery disease of native artery of native heart with stable angina pectoris (HCC)  CBC & Differential    Comprehensive Metabolic Panel    Adult Transthoracic Echo Limited W/ Cont if Necessary Per Protocol    D-dimer, Quantitative    TSH   2. Primary hypertension  CBC & Differential    Comprehensive Metabolic Panel    Adult Transthoracic Echo Limited W/ Cont if Necessary Per Protocol    D-dimer, Quantitative    TSH   3. Shortness of breath  CBC & Differential    Comprehensive Metabolic Panel    Adult Transthoracic Echo Limited W/ Cont if Necessary Per Protocol    D-dimer, Quantitative    TSH     1.  With ongoing dyspnea and clinical course otherwise, I will schedule for repeat limited echo.  We need to evaluate systolic function and cardiac parameters otherwise post bypass.  We will evaluate for effusion as well.    2.  I would like to schedule for laboratory evaluation.  We will schedule for CBC, CMP, TSH, and D-dimer given fatigue and slight increase in dyspnea.    3.  Otherwise, I feel that the patient is on  appropriate medications for now.  I would make no adjustment in the same.    4.  We have reviewed post bypass precautions.  We have reviewed diet, appropriate exercise, activity restrictions, etc.  We have discussed titrated exercise regimen and increasing activity to be expected over the next weeks to months.    5.  I offered consideration for cardiac rehab.  He would prefer to hold on that for now.    6.  We will see him routinely through the clinic.  He appears to be progressing appropriately for now.  Further pending all the above.  He will call back immediately for any ongoing issues.                      Electronically signed by:

## 2022-08-05 ENCOUNTER — HOSPITAL ENCOUNTER (OUTPATIENT)
Dept: CARDIOLOGY | Facility: HOSPITAL | Age: 62
Discharge: HOME | End: 2022-08-05
Admitting: PHYSICIAN ASSISTANT

## 2022-08-05 ENCOUNTER — TELEPHONE (OUTPATIENT)
Dept: CARDIOLOGY | Facility: CLINIC | Age: 62
End: 2022-08-05

## 2022-08-05 DIAGNOSIS — I10 PRIMARY HYPERTENSION: ICD-10-CM

## 2022-08-05 DIAGNOSIS — R94.30 EJECTION FRACTION < 50%: ICD-10-CM

## 2022-08-05 DIAGNOSIS — I25.118 CORONARY ARTERY DISEASE OF NATIVE ARTERY OF NATIVE HEART WITH STABLE ANGINA PECTORIS: ICD-10-CM

## 2022-08-05 DIAGNOSIS — R06.02 SHORTNESS OF BREATH: ICD-10-CM

## 2022-08-05 DIAGNOSIS — R79.89 ELEVATED D-DIMER: Primary | ICD-10-CM

## 2022-08-05 PROCEDURE — 93308 TTE F-UP OR LMTD: CPT

## 2022-08-05 PROCEDURE — 93308 TTE F-UP OR LMTD: CPT | Performed by: INTERNAL MEDICINE

## 2022-08-05 NOTE — TELEPHONE ENCOUNTER
Spoke to Otilia ( on HIPPA ) she verbalized understanding order has been put it.     AT [facility]           ----- Message from MARBIN Muñoz sent at 8/5/2022 12:04 PM EDT -----  CTA of the chest.  Please forward TSH to his primary care provider as well.

## 2022-08-08 ENCOUNTER — TELEPHONE (OUTPATIENT)
Dept: CARDIOLOGY | Facility: CLINIC | Age: 62
End: 2022-08-08

## 2022-08-08 ENCOUNTER — OFFICE VISIT (OUTPATIENT)
Dept: CARDIAC SURGERY | Facility: CLINIC | Age: 62
End: 2022-08-08

## 2022-08-08 VITALS
BODY MASS INDEX: 34.12 KG/M2 | WEIGHT: 217.4 LBS | SYSTOLIC BLOOD PRESSURE: 112 MMHG | HEART RATE: 52 BPM | DIASTOLIC BLOOD PRESSURE: 68 MMHG | HEIGHT: 67 IN | TEMPERATURE: 97.8 F | OXYGEN SATURATION: 98 %

## 2022-08-08 DIAGNOSIS — Z95.1 S/P CABG X 5: ICD-10-CM

## 2022-08-08 DIAGNOSIS — I25.118 CORONARY ARTERY DISEASE OF NATIVE ARTERY OF NATIVE HEART WITH STABLE ANGINA PECTORIS: Primary | ICD-10-CM

## 2022-08-08 PROCEDURE — 71046 X-RAY EXAM CHEST 2 VIEWS: CPT | Performed by: NURSE PRACTITIONER

## 2022-08-08 PROCEDURE — 99024 POSTOP FOLLOW-UP VISIT: CPT | Performed by: NURSE PRACTITIONER

## 2022-08-08 RX ORDER — FUROSEMIDE 40 MG/1
40 TABLET ORAL DAILY
Qty: 5 TABLET | Refills: 0 | Status: SHIPPED | OUTPATIENT
Start: 2022-08-08 | End: 2022-09-21 | Stop reason: SDUPTHER

## 2022-08-08 RX ORDER — POTASSIUM CHLORIDE 750 MG/1
10 TABLET, FILM COATED, EXTENDED RELEASE ORAL DAILY
Qty: 5 TABLET | Refills: 0 | Status: SHIPPED | OUTPATIENT
Start: 2022-08-08 | End: 2022-09-21 | Stop reason: SDUPTHER

## 2022-08-08 NOTE — PROGRESS NOTES
[facility] Cardiothoracic Surgery Office Follow Up Note     Date of Encounter: 2022     Name: Josh Horan  : 1960     Referred By: No ref. provider found  PCP: Jerrica Madrid APRN    Chief Complaint:    Chief Complaint   Patient presents with   • Coronary Artery Disease     Hospital follow up s/p CABg x5 on 22 with ROM        Subjective      History of Present Illness:    Josh Horan is a 61 y.o. male non-smoker, with history of hypertension, hyperlipidemia, obstructive sleep apnea, Lyme disease, bipolar disorder, and CAD s/p CABG x5 with EVH of right greater saphenous vein and exploration of the left leg on 2022 by Dr. Anton.  Postoperatively patient experienced A. fib placed on amiodarone protocol, slow progression of ambulation, slow wean off supplemental oxygen, but patient was ultimately discharged on POD #8 with EKG revealing sinus rhythm. Patient was seen by cardiology MISAEL Kennedy on 22 with complaints of ongoing dyspnea and recommendations to proceed with ECHO, labs, and CT Chest. It does not appear that he was started on any diuretic therapy at this time. Patient presents today for postoperative follow up. Patient reports that he has had chest wall nerve pain around his incision consistent with normal postoperative neuropathic pain. This is controlled with OTC medications. Patient does report he had episode of left arm pain that was similar for his preoperative anginal equivalent this weekend after he had been seen by cardiology. This resolved spontaneously after a few minutes. He reports slow improvement in SOA/fatigue. He has been very sedentary which he contributes to depression that has been slightly worse postoperatively. He does follow closely with his PCP, who did state this was possible, and plans to see her in the next couple weeks for follow-up.  Patient denies any fevers, chills, or body aches.  He is hesitant about starting cardiac rehab, but his  family is encouraging him to do so.  His sternotomy, MTS, and EVH sites are well-healing.    Review of Systems:  Review of Systems   Constitutional: Negative for chills, decreased appetite, diaphoresis, fever, malaise/fatigue, night sweats, weight gain and weight loss.   HENT: Negative for hoarse voice.    Eyes: Negative for blurred vision, double vision and visual disturbance.   Cardiovascular: Positive for chest pain ('feels like chest is full') and dyspnea on exertion. Negative for claudication, irregular heartbeat, leg swelling, near-syncope, orthopnea, palpitations, paroxysmal nocturnal dyspnea and syncope.   Respiratory: Negative for cough, hemoptysis, shortness of breath, sputum production and wheezing.    Hematologic/Lymphatic: Negative for adenopathy and bleeding problem. Does not bruise/bleed easily.   Skin: Negative for color change, nail changes, poor wound healing and rash.   Musculoskeletal: Positive for back pain. Negative for falls and muscle cramps.   Gastrointestinal: Negative for abdominal pain, dysphagia and heartburn.   Genitourinary: Negative for flank pain.   Neurological: Negative for brief paralysis, disturbances in coordination, dizziness, focal weakness, headaches, light-headedness, loss of balance, numbness, paresthesias, sensory change, vertigo and weakness.   Psychiatric/Behavioral: Negative for depression and suicidal ideas.   Allergic/Immunologic: Negative for persistent infections.       I have reviewed the following portions of the patient's history: allergies, current medications, past family history, past medical history, past social history, past surgical history, problem list and ROS and confirm it's accurate.    Allergies:  Allergies   Allergen Reactions   • Adhesive Tape Rash       Medications:      Current Outpatient Medications:   •  amiodarone (PACERONE) 200 MG tablet, Take 1 tablet by mouth Daily. Patient is to take 200mg twice daily until July 28th and then 200mg once  daily thereafter., Disp: 90 tablet, Rfl: 3  •  aspirin  MG tablet, Take 1 tablet by mouth Daily., Disp: 90 tablet, Rfl: 3  •  atorvastatin (LIPITOR) 40 MG tablet, Take 1 tablet by mouth Every Night., Disp: 90 tablet, Rfl: 3  •  busPIRone (BUSPAR) 10 MG tablet, Take 10 mg by mouth 3 (Three) Times a Day., Disp: , Rfl:   •  cetirizine (zyrTEC) 10 MG tablet, Take 10 mg by mouth Daily., Disp: , Rfl:   •  DULoxetine (CYMBALTA) 60 MG capsule, Take 60 mg by mouth Daily., Disp: , Rfl:   •  eszopiclone (LUNESTA) 1 MG tablet, Take 1 mg by mouth Every Night. Take immediately before bedtime, Disp: , Rfl:   •  ibuprofen (ADVIL,MOTRIN) 800 MG tablet, Take 800 mg by mouth Every 6 (Six) Hours As Needed for Mild Pain ., Disp: , Rfl:   •  levothyroxine (SYNTHROID, LEVOTHROID) 100 MCG tablet, Take 100 mcg by mouth Daily., Disp: , Rfl:   •  losartan (COZAAR) 100 MG tablet, Take 0.5 tablets by mouth Daily., Disp: 30 tablet, Rfl: 3  •  metoprolol tartrate (LOPRESSOR) 50 MG tablet, Take 1 tablet by mouth Every 12 (Twelve) Hours., Disp: 60 tablet, Rfl: 3  •  nitroglycerin (NITROSTAT) 0.4 MG SL tablet, 1 under the tongue as needed for angina, may repeat q5mins for up three doses, Disp: 25 tablet, Rfl: 3  •  omeprazole (priLOSEC) 40 MG capsule, Take 40 mg by mouth Daily., Disp: , Rfl:   •  pharmacy consult - MTM, Daily., Disp: , Rfl:   •  tiZANidine (ZANAFLEX) 4 MG tablet, Take 4 mg by mouth 2 (Two) Times a Day As Needed for Muscle Spasms., Disp: , Rfl:   •  vitamin B-12 (CYANOCOBALAMIN) 1000 MCG tablet, Take 1,000 mcg by mouth Daily., Disp: , Rfl:   •  vitamin D (ERGOCALCIFEROL) 1.25 MG (12609 UT) capsule capsule, Take 50,000 Units by mouth 1 (One) Time Per Week., Disp: , Rfl:   •  furosemide (LASIX) 40 MG tablet, Take 1 tablet by mouth Daily., Disp: 5 tablet, Rfl: 0  •  potassium chloride 10 MEQ CR tablet, Take 1 tablet by mouth Daily., Disp: 5 tablet, Rfl: 0    History:   Past Medical History:   Diagnosis Date   • Acid reflux    •  "Anxiety    • Arthritis    • Back pain    • Bipolar 1 disorder (HCC)    • Coronary artery disease    • Depression    • Headache    • Hiatal hernia    • Hyperlipidemia    • Hypertension    • Hyperthyroidism    • Hypothyroidism    • Lyme disease    • Migraine    • MRSA infection     1.5-2 years ago- back   • Panic attacks    • PTSD (post-traumatic stress disorder)    • Sciatic nerve pain    • Seasonal allergies    • Sleep apnea     doesnt use machine   • Tinnitus    • Wears glasses        Past Surgical History:   Procedure Laterality Date   • CARDIAC CATHETERIZATION      6/24/2022 Dr. Salguero   • CORONARY ARTERY BYPASS GRAFT N/A 7/12/2022    Procedure: MEDIAN STERNOTOMY CORONARY ARTERY BYPASS GRAFTING X5 , UTILIZING THE LEFT INTERNAL MAMMERY GRAFT, ENDOSCOPIC VEIN HARVEST OF THE GREATER RIGHT SAPHENOUS VEIN WITH EXPLORATION OF THE LEFT LEG;  Surgeon: Baldomero Anton MD;  Location: [facility];  Service: Cardiothoracic;  Laterality: N/A;   • HERNIA REPAIR Left     umbilical hernia repair - pt unsure about mesh   • OTHER SURGICAL HISTORY      Lymph node removal neck   • VASECTOMY         Social History     Socioeconomic History   • Marital status: Single   • Number of children: 2   Tobacco Use   • Smoking status: Never Smoker   • Smokeless tobacco: Never Used   Vaping Use   • Vaping Use: Never used   Substance and Sexual Activity   • Alcohol use: Never   • Drug use: Yes     Frequency: 7.0 times per week     Types: Marijuana   • Sexual activity: Defer        Family History   Problem Relation Age of Onset   • Hypertension Mother    • Heart disease Mother    • Cancer Mother    • Heart disease Father    • Heart attack Father        Objective     Physical Exam:  Vitals:    08/08/22 1025   BP: 112/68   BP Location: Right arm   Patient Position: Sitting   Pulse: 52   Temp: 97.8 °F (36.6 °C)   SpO2: 98%   Weight: 98.6 kg (217 lb 6.4 oz)   Height: 170.2 cm (67\")      Body mass index is 34.05 kg/m².    Physical Exam  Vitals and " nursing note reviewed.   Constitutional:       Appearance: Normal appearance. He is well-developed.   HENT:      Head: Normocephalic and atraumatic.   Eyes:      Pupils: Pupils are equal, round, and reactive to light.   Neck:      Vascular: No carotid bruit.   Cardiovascular:      Rate and Rhythm: Normal rate and regular rhythm.      Pulses: Normal pulses.      Heart sounds: Normal heart sounds, S1 normal and S2 normal. No murmur heard.  Pulmonary:      Effort: Pulmonary effort is normal.      Breath sounds: Normal breath sounds.      Comments: Poor effort  Abdominal:      Palpations: Abdomen is soft.   Musculoskeletal:         General: No swelling.      Cervical back: Neck supple.      Right lower leg: No edema.      Left lower leg: No edema.   Skin:     General: Skin is warm and dry.      Capillary Refill: Capillary refill takes less than 2 seconds.      Findings: No bruising.      Comments: Sternotomy incision: well- healing, well-approximated, no erythema, drainage, warmth or induration  Mediastinal chest tube incisions:  well- healing, well-approximated, no erythema, drainage, warmth or induration, sutures  EVH site right leg:  well- healing, well-approximated, no erythema, drainage, warmth or induration    Neurological:      General: No focal deficit present.      Mental Status: He is alert and oriented to person, place, and time. Mental status is at baseline.      GCS: GCS eye subscore is 4. GCS verbal subscore is 5. GCS motor subscore is 6.      Motor: Motor function is intact.      Coordination: Coordination is intact.      Gait: Gait is intact.   Psychiatric:         Mood and Affect: Mood normal.         Speech: Speech normal.         Behavior: Behavior normal. Behavior is cooperative.         Cognition and Memory: Cognition normal.         Imaging/Labs:    XR Chest 2 View  Result Date: 8/8/2022  1.  Cardiomegaly. 2.  Small bibasilar effusions.  This report was finalized on 8/8/2022 10:53 AM by Tapan  MD Cornelia.          Assessment / Plan      Assessment / Plan:  Diagnoses and all orders for this visit:    1. Coronary artery disease of native artery of native heart with stable angina pectoris (HCC) (Primary)    2. S/P CABG x 5       1. CAD s/p CABG x5 with EVH of right greater saphenous vein and exploration of the left leg on 7/12/2022 by Dr. Anton:  Postoperatively patient experienced A. fib placed on amiodarone protocol, slow progression of ambulation, slow wean off supplemental oxygen, but patient was ultimately discharged on POD #8 with EKG revealing sinus rhythm. Patient was seen by cardiology MISAEL Kennedy on 8/4/22 with complaints of ongoing dyspnea and recommendations to proceed with ECHO, labs, and CT Chest. It does not appear that he was started on any diuretic therapy at this time. Patient reports that he has had chest wall nerve pain around his incision consistent with normal postoperative neuropathic pain. This is controlled with OTC medications. Patient does report he had episode of left arm pain that was similar for his preoperative anginal equivalent this weekend after he had been seen by cardiology. This resolved spontaneously after a few minutes. He reports slow improvement in SOA/fatigue. He has been very sedentary which he contributes to depression that has been slightly worse postoperatively. He does follow closely with his PCP, who did state this was possible, and plans to see her in the next couple weeks for follow-up.  Patient denies any fevers, chills, or body aches.  He is hesitant about starting cardiac rehab, but his family is encouraging him to do so.  His sternotomy, MTS, and EVH sites are well-healing. Reviewed ECHO, CTA Chest order by MISAEL Kennedy: ECHO revealed EF 51 to 55% and small (less than 1 cm) pericardial effusion and CTA chest noted no pulmonary embolism, dense basilar atelectasis, small to moderate layering effusion on the left, concern for mild vascular congestion and  small pericardial effusion.  CXR performed in clinic significant for cardiomegaly and small bibasilar effusions.  Contacted cardiology MARBIN Kennedy who is going to start patient on 5-day course of Lasix therapy and reevaluate in the next couple of weeks.  I also let him know about patient's report of left arm pain and he will continue to monitor. Labs were significant for for elevated TSH, patient's family does report patient did not have his thyroid medication for about a week.  Encourage patient to follow-up with PCP with recommendations to repeat TSH as appropriate. Will ensure referral is made to cardiac rehab.  Patient has been very sedentary and I had a long discussion with he and family about the importance of pulmonary toilet including ambulation and use of incentive spirometer.  His family is very supportive and they have encouraged him to proceed with cardiac rehab and will follow up with PCP regarding his depression.  Patient is to contact our office or cardiology for any further complaints of chest pain similar to anginal equivalent, worsening SOA, fevers, chills, or body aches.  We will plan to see the patient back in 4 weeks for reassessment.    Patient Education:  Activity Restrictions: You may resume driving at 6 weeks post-operatively. No lifting more than 10 lbs for 12 weeks (3 months). At this time you can slowly increase your weight as tolerated. You should not partake in any activities that involve twisting of your upper chest and torso such as (but not limited to) golfing or tennis, lawn mowing including zero turn mowers, use of law trimmers or edgers, shoveling, vacuuming, etc.   Wound Care: continue to keep your incisions clean and dry. You may use plain antibacterial soap (i.e. dial) and water to clean and pat dry. No lotions, creams, oils, powders, salves, or ointments.     Follow Up:   Return in about 4 weeks (around 9/5/2022).   Or sooner for any further concerns or worsening sign and  symptoms. If unable to reach us in the office please dial 911 or go to the nearest emergency department.      Elza WELLS  [facility] Cardiothoracic Surgery

## 2022-08-08 NOTE — TELEPHONE ENCOUNTER
CTA chest reviewed by Josh ZAZUETA - V/O PT to start lasix 40 once daily along with potassium 10 mg once a day for 5 days due to CTA chest showing mild heart failure       Otilia ( on HIPPA ) verbalized understanding       AT [facility]           ----- Message from MARBIN Muñoz sent at 8/8/2022  9:53 AM EDT -----  See me on this please.

## 2022-08-17 ENCOUNTER — READMISSION MANAGEMENT (OUTPATIENT)
Dept: CALL CENTER | Facility: HOSPITAL | Age: 62
End: 2022-08-17

## 2022-08-17 NOTE — OUTREACH NOTE
CT Surgery Week 4 Survey    Flowsheet Row Responses   [facility] ([facility] patient discharged from? [place]   Does the patient have one of the following disease processes/diagnoses(primary or secondary)? Cardiothoracic surgery   Week 4 attempt successful? Yes   Call start time 1127   Call end time 1130   Discharge diagnosis CORONARY ARTERY BYPASS GRAFTING X5    Is patient permission given to speak with other caregiver? Yes   List who call center can speak with JESUS YNI   Person spoke with today (if not patient) and relationship JESUS YIN- WIFE   Meds reviewed with patient/caregiver? Yes   Is the patient having any side effects they believe may be caused by any medication additions or changes? No   Is the patient taking all medications as directed (includes completed medication regime)? Yes   Has the patient kept scheduled appointments due by today? Yes   Is the patient still receiving Home Health Services? N/A   Psychosocial issues? No   What is the patient's perception of their health status since discharge? Improving   Nursing interventions Nurse provided patient education   Is the patient/caregiver able to teach back steps to recovery at home? Rest and rebuild strength, gradually increase activity, Set small, achievable goals for return to baseline health   If the patient is a current smoker, are they able to teach back resources for cessation? Not a smoker   Is the patient/caregiver able to teach back the hierarchy of who to call/visit for symptoms/problems? PCP, Specialist, Home health nurse, Urgent Care, ED, 911 Yes   Week 4 Call Completed? Yes          SLIME SOLIZ - Licensed Nurse

## 2022-08-23 LAB
BH CV ECHO MEAS - ACS: 2.07 CM
BH CV ECHO MEAS - AO ROOT DIAM: 3.2 CM
BH CV ECHO MEAS - EDV(CUBED): 86.4 ML
BH CV ECHO MEAS - EDV(MOD-SP2): 102 ML
BH CV ECHO MEAS - EDV(MOD-SP4): 95.1 ML
BH CV ECHO MEAS - EF(MOD-BP): 50.7 %
BH CV ECHO MEAS - EF(MOD-SP2): 40.5 %
BH CV ECHO MEAS - EF(MOD-SP4): 48.9 %
BH CV ECHO MEAS - EF_3D-VOL: 52 %
BH CV ECHO MEAS - ESV(CUBED): 17.8 ML
BH CV ECHO MEAS - ESV(MOD-SP2): 60.7 ML
BH CV ECHO MEAS - ESV(MOD-SP4): 48.6 ML
BH CV ECHO MEAS - FS: 41 %
BH CV ECHO MEAS - IVS/LVPW: 1.04 CM
BH CV ECHO MEAS - IVSD: 1.45 CM
BH CV ECHO MEAS - LA DIMENSION: 3.8 CM
BH CV ECHO MEAS - LV DIASTOLIC VOL/BSA (35-75): 45.8 CM2
BH CV ECHO MEAS - LV MASS(C)D: 248.4 GRAMS
BH CV ECHO MEAS - LV SYSTOLIC VOL/BSA (12-30): 23.4 CM2
BH CV ECHO MEAS - LVIDD: 4.4 CM
BH CV ECHO MEAS - LVIDS: 2.6 CM
BH CV ECHO MEAS - LVOT AREA: 3.7 CM2
BH CV ECHO MEAS - LVOT DIAM: 2.18 CM
BH CV ECHO MEAS - LVPWD: 1.4 CM
BH CV ECHO MEAS - RVDD: 3.5 CM
BH CV ECHO MEAS - SI(MOD-SP2): 19.9 ML/M2
BH CV ECHO MEAS - SI(MOD-SP4): 22.4 ML/M2
BH CV ECHO MEAS - SV(MOD-SP2): 41.3 ML
BH CV ECHO MEAS - SV(MOD-SP4): 46.5 ML
LEFT ATRIUM VOLUME INDEX: 26.3 ML/M2
MAXIMAL PREDICTED HEART RATE: 161 BPM
STRESS TARGET HR: 137 BPM

## 2022-08-31 ENCOUNTER — TELEPHONE (OUTPATIENT)
Dept: CARDIOLOGY | Facility: CLINIC | Age: 62
End: 2022-08-31

## 2022-08-31 DIAGNOSIS — R06.02 SHORTNESS OF BREATH: Primary | ICD-10-CM

## 2022-08-31 DIAGNOSIS — I25.118 CORONARY ARTERY DISEASE OF NATIVE ARTERY OF NATIVE HEART WITH STABLE ANGINA PECTORIS: ICD-10-CM

## 2022-08-31 NOTE — TELEPHONE ENCOUNTER
Called and spoke with patients wife, as patient was not there at this time also. States to go ahead and order testing. I advised they would be called with appointment date and time. Call office back with anymore questions or concerns. Patients wife verbally understands.

## 2022-08-31 NOTE — TELEPHONE ENCOUNTER
Pt returned Adore's call.  He reports his symptoms are ongoing with no improvement.  He is agreeable to have the stress test repeated if Josh feels it is necessary.

## 2022-08-31 NOTE — TELEPHONE ENCOUNTER
Called and spoke with patients wife, she states he is currently not at home. She will have him call me once,he gets home to make a decision if this is something they would like to continue. She says, as far as she is aware he is feeling a lot better.

## 2022-08-31 NOTE — TELEPHONE ENCOUNTER
----- Message from Lewis Pedraza sent at 8/30/2022  4:57 PM EDT -----    ----- Message -----  From: Diana Pike MA  Sent: 8/29/2022   8:25 AM EDT  To: Neva Chen MA      ----- Message -----  From: Josh Kennedy PA  Sent: 8/24/2022   7:15 PM EDT  To: Diana Pike MA    EF has declined slightly.  If ongoing symptoms, would consider repeat nuclear stress test.

## 2022-09-08 ENCOUNTER — OFFICE VISIT (OUTPATIENT)
Dept: CARDIAC SURGERY | Facility: CLINIC | Age: 62
End: 2022-09-08

## 2022-09-08 VITALS
TEMPERATURE: 97.3 F | WEIGHT: 211 LBS | OXYGEN SATURATION: 98 % | DIASTOLIC BLOOD PRESSURE: 89 MMHG | HEART RATE: 67 BPM | HEIGHT: 67 IN | BODY MASS INDEX: 33.12 KG/M2 | SYSTOLIC BLOOD PRESSURE: 159 MMHG

## 2022-09-08 DIAGNOSIS — Z95.1 S/P CABG (CORONARY ARTERY BYPASS GRAFT): ICD-10-CM

## 2022-09-08 PROCEDURE — 99024 POSTOP FOLLOW-UP VISIT: CPT | Performed by: NURSE PRACTITIONER

## 2022-09-08 PROCEDURE — 71046 X-RAY EXAM CHEST 2 VIEWS: CPT | Performed by: NURSE PRACTITIONER

## 2022-09-08 NOTE — PROGRESS NOTES
[facility] Cardiothoracic Surgery Office Follow Up Note     Date of Encounter: 2022     Name: Josh Horan  : 1960     Referred By: No ref. provider found  PCP: Jerrica Madrid APRN    Chief Complaint:    Chief Complaint   Patient presents with   • Post-op     4 week follow up s/p CABG 22,complains of pain in the right side of his chest.       Subjective      History of Present Illness:    Josh Horan is a 62 y.o. male non-smoker, with history of hypertension, hyperlipidemia, obstructive sleep apnea, Lyme disease, bipolar disorder, and CAD s/p CABG x5 with EVH of right greater saphenous vein and exploration of the left leg on 2022 by Dr. Anton.  Postoperatively patient experienced A. fib placed on amiodarone protocol, slow progression of ambulation, slow wean off supplemental oxygen, but patient was ultimately discharged on POD #8 with EKG revealing sinus rhythm. Patient was seen by cardiology MISAEL Kennedy on 22 with complaints of ongoing dyspnea and recommendations to proceed with ECHO, labs, and CT Chest.  Patient was last seen in clinic 2022 with concerns for patient being sedentary and persistent SOA/fatigue.  At that time, reviewed ECHO, CTA Chest order by MISAEL Kennedy: ECHO revealed EF 51 to 55% and small (less than 1 cm) pericardial effusion and CTA chest noted no pulmonary embolism, dense basilar atelectasis, small to moderate layering effusion on the left, concern for mild vascular congestion and small pericardial effusion.  Cardiology was contacted during that office visit who started patient on 5-day course of Lasix therapy with close follow-up.  Of note, patient had significantly elevated TSH and patient had been out of his thyroid medication with recommendations to follow-up with PCP.  Patient presents today for 4-week follow-up and repeat CXR.  Since that visit, patient has been seen by cardiology with concern for decline EF and plans to repeat nuclear stress  test in 1 week.  Patient denies improvement in SOA/fatigue.  He denies any cardiac chest pain, but reports chest soreness that is consistent with normal postoperative pain.  He denies any fevers, chills, or body aches.  He has continued not to walk very much, but reports he has been using his incentive spirometer.  He has reached out to cardiac rehab, but was told that they did not have referral.     Review of Systems:  Review of Systems   Constitutional: Positive for malaise/fatigue and night sweats. Negative for chills, decreased appetite and fever.   Cardiovascular: Positive for chest pain (right side of chest), dyspnea on exertion and palpitations. Negative for claudication, irregular heartbeat, leg swelling, near-syncope, orthopnea and syncope.   Respiratory: Positive for cough. Negative for hemoptysis, shortness of breath, sputum production and wheezing.    Hematologic/Lymphatic: Negative.  Negative for bleeding problem. Does not bruise/bleed easily.   Skin: Negative.  Negative for color change, poor wound healing and rash.   Musculoskeletal: Positive for back pain and joint pain. Negative for falls.   Gastrointestinal: Negative.  Negative for abdominal pain, constipation, diarrhea, nausea and vomiting.   Neurological: Positive for numbness. Negative for focal weakness and paresthesias.   Psychiatric/Behavioral: Positive for depression. The patient has insomnia and is nervous/anxious.        I have reviewed the following portions of the patient's history: allergies, current medications, past family history, past medical history, past social history, past surgical history, problem list and ROS and confirm it's accurate.    Allergies:  Allergies   Allergen Reactions   • Adhesive Tape Rash       Medications:      Current Outpatient Medications:   •  amiodarone (PACERONE) 200 MG tablet, Take 1 tablet by mouth Daily. Patient is to take 200mg twice daily until July 28th and then 200mg once daily thereafter., Disp:  90 tablet, Rfl: 3  •  aspirin  MG tablet, Take 1 tablet by mouth Daily., Disp: 90 tablet, Rfl: 3  •  atorvastatin (LIPITOR) 40 MG tablet, Take 1 tablet by mouth Every Night., Disp: 90 tablet, Rfl: 3  •  busPIRone (BUSPAR) 10 MG tablet, Take 10 mg by mouth 3 (Three) Times a Day., Disp: , Rfl:   •  cetirizine (zyrTEC) 10 MG tablet, Take 10 mg by mouth Daily., Disp: , Rfl:   •  DULoxetine (CYMBALTA) 60 MG capsule, Take 60 mg by mouth Daily., Disp: , Rfl:   •  eszopiclone (LUNESTA) 1 MG tablet, Take 1 mg by mouth Every Night. Take immediately before bedtime, Disp: , Rfl:   •  ibuprofen (ADVIL,MOTRIN) 800 MG tablet, Take 800 mg by mouth Every 6 (Six) Hours As Needed for Mild Pain ., Disp: , Rfl:   •  levothyroxine (SYNTHROID, LEVOTHROID) 100 MCG tablet, Take 100 mcg by mouth Daily., Disp: , Rfl:   •  losartan (COZAAR) 100 MG tablet, Take 0.5 tablets by mouth Daily., Disp: 30 tablet, Rfl: 3  •  metoprolol tartrate (LOPRESSOR) 50 MG tablet, Take 1 tablet by mouth Every 12 (Twelve) Hours., Disp: 60 tablet, Rfl: 3  •  nitroglycerin (NITROSTAT) 0.4 MG SL tablet, 1 under the tongue as needed for angina, may repeat q5mins for up three doses, Disp: 25 tablet, Rfl: 3  •  omeprazole (priLOSEC) 40 MG capsule, Take 40 mg by mouth Daily., Disp: , Rfl:   •  pharmacy consult - MTM, Daily., Disp: , Rfl:   •  potassium chloride 10 MEQ CR tablet, Take 1 tablet by mouth Daily., Disp: 5 tablet, Rfl: 0  •  tiZANidine (ZANAFLEX) 4 MG tablet, Take 4 mg by mouth 2 (Two) Times a Day As Needed for Muscle Spasms., Disp: , Rfl:   •  vitamin B-12 (CYANOCOBALAMIN) 1000 MCG tablet, Take 1,000 mcg by mouth Daily., Disp: , Rfl:   •  vitamin D (ERGOCALCIFEROL) 1.25 MG (83282 UT) capsule capsule, Take 50,000 Units by mouth 1 (One) Time Per Week., Disp: , Rfl:   •  furosemide (LASIX) 40 MG tablet, Take 1 tablet by mouth Daily. (Patient not taking: Reported on 9/8/2022), Disp: 5 tablet, Rfl: 0    History:   Past Medical History:   Diagnosis Date   •  "Acid reflux    • Anxiety    • Arthritis    • Back pain    • Bipolar 1 disorder (HCC)    • Coronary artery disease    • Depression    • Headache    • Hiatal hernia    • Hyperlipidemia    • Hypertension    • Hyperthyroidism    • Hypothyroidism    • Lyme disease    • Migraine    • MRSA infection     1.5-2 years ago- back   • Panic attacks    • PTSD (post-traumatic stress disorder)    • Sciatic nerve pain    • Seasonal allergies    • Sleep apnea     doesnt use machine   • Tinnitus    • Wears glasses        Past Surgical History:   Procedure Laterality Date   • CARDIAC CATHETERIZATION      6/24/2022 Dr. Salguero   • CORONARY ARTERY BYPASS GRAFT N/A 7/12/2022    Procedure: MEDIAN STERNOTOMY CORONARY ARTERY BYPASS GRAFTING X5 , UTILIZING THE LEFT INTERNAL MAMMERY GRAFT, ENDOSCOPIC VEIN HARVEST OF THE GREATER RIGHT SAPHENOUS VEIN WITH EXPLORATION OF THE LEFT LEG;  Surgeon: Baldomero Anton MD;  Location: [facility];  Service: Cardiothoracic;  Laterality: N/A;   • HERNIA REPAIR Left     umbilical hernia repair - pt unsure about mesh   • OTHER SURGICAL HISTORY      Lymph node removal neck   • VASECTOMY         Social History     Socioeconomic History   • Marital status: Single   • Number of children: 2   Tobacco Use   • Smoking status: Never Smoker   • Smokeless tobacco: Never Used   Vaping Use   • Vaping Use: Never used   Substance and Sexual Activity   • Alcohol use: Never   • Drug use: Yes     Frequency: 7.0 times per week     Types: Marijuana   • Sexual activity: Defer        Family History   Problem Relation Age of Onset   • Hypertension Mother    • Heart disease Mother    • Cancer Mother    • Heart disease Father    • Heart attack Father        Objective     Physical Exam:  Vitals:    09/08/22 1421   BP: 159/89   BP Location: Right arm   Patient Position: Sitting   Pulse: 67   Temp: 97.3 °F (36.3 °C)   SpO2: 98%   Weight: 95.7 kg (211 lb)   Height: 170.2 cm (67\")      Body mass index is 33.05 kg/m².    Physical " Exam  Vitals and nursing note reviewed.   Constitutional:       Appearance: Normal appearance. He is well-developed.   HENT:      Head: Normocephalic and atraumatic.   Eyes:      Pupils: Pupils are equal, round, and reactive to light.   Neck:      Vascular: No carotid bruit.   Cardiovascular:      Rate and Rhythm: Normal rate and regular rhythm.      Pulses: Normal pulses.      Heart sounds: Normal heart sounds, S1 normal and S2 normal. No murmur heard.  Pulmonary:      Effort: Pulmonary effort is normal.      Breath sounds: Normal breath sounds.      Comments: Poor effort  Abdominal:      Palpations: Abdomen is soft.   Musculoskeletal:         General: No swelling.      Cervical back: Neck supple.      Right lower leg: No edema.      Left lower leg: No edema.   Skin:     General: Skin is warm and dry.      Capillary Refill: Capillary refill takes less than 2 seconds.      Findings: No bruising.      Comments: Sternotomy incision: Completely healed mediastinal chest tube incisions: Completely healed  EVH site right leg: Completely healed   Neurological:      General: No focal deficit present.      Mental Status: He is alert and oriented to person, place, and time. Mental status is at baseline.      GCS: GCS eye subscore is 4. GCS verbal subscore is 5. GCS motor subscore is 6.      Motor: Motor function is intact.      Coordination: Coordination is intact.      Gait: Gait is intact.   Psychiatric:         Mood and Affect: Mood normal.         Speech: Speech normal.         Behavior: Behavior normal. Behavior is cooperative.         Cognition and Memory: Cognition normal.         Imaging/Labs:    XR Chest 2 View    Result Date: 9/8/2022  1. Stable mild cardiomegaly. 2. Unchanged small left pleural effusion.  This report was finalized on 9/8/2022 2:38 PM by Antonio Woo MD.             Assessment / Plan      Assessment / Plan:  Diagnoses and all orders for this visit:    1. S/P CABG (coronary artery bypass graft)        1. CAD s/p CABG x5 with EVH of right greater saphenous vein and exploration of the left leg on 7/12/2022 by Dr. Anton:  Patient was last seen in clinic 8/8/2022 with concerns for patient being sedentary and persistent SOA/fatigue.  At that time, reviewed ECHO, CTA Chest order by MISAEL Kennedy: ECHO revealed EF 51 to 55% and small (less than 1 cm) pericardial effusion and CTA chest noted no pulmonary embolism, dense basilar atelectasis, small to moderate layering effusion on the left, concern for mild vascular congestion and small pericardial effusion.  Cardiology was contacted during that office visit who started patient on 5-day course of Lasix therapy with close follow-up.  Of note, patient had significantly elevated TSH and patient had been out of his thyroid medication with recommendations to follow-up with PCP.    Since that visit, patient has been seen by cardiology with concern for decline EF and plans to repeat nuclear stress test in 1 week.  Patient denies improvement in SOA/fatigue.  He denies any cardiac chest pain, but reports chest soreness that is consistent with normal postoperative pain.  He denies any fevers, chills, or body aches.  He has continued not to walk very much, but reports he has been using his incentive spirometer.  He has reached out to cardiac rehab, but was told that they did not have referral.  Stable CXR today with continued cardiomegaly and scant left pleural effusion after Lasix therapy.  I again stressed the importance of ambulation and use of incentive spirometer.  Patient needs to be walking at least 10 minutes 3 times daily which I stressed.  Patient reports he has had trouble with his blood pressure being elevated over the last couple of weeks, encouraged him to reach out to cardiology for further management.  Plans to follow-up with cardiology after stress test.  Patient is otherwise stable from a postoperative standpoint.  We will plan to see the patient back on an  as-needed basis for any future surgical needs, otherwise patient can follow closely with his cardiologist.        Follow Up:   Return if symptoms worsen or fail to improve.   Or sooner for any further concerns or worsening sign and symptoms. If unable to reach us in the office please dial 911 or go to the nearest emergency department.      Elza WELLS  [facility] Cardiothoracic Surgery

## 2022-09-15 ENCOUNTER — HOSPITAL ENCOUNTER (OUTPATIENT)
Dept: CARDIOLOGY | Facility: HOSPITAL | Age: 62
Discharge: HOME | End: 2022-09-15

## 2022-09-15 ENCOUNTER — LAB (OUTPATIENT)
Dept: LAB | Facility: HOSPITAL | Age: 62
End: 2022-09-15

## 2022-09-15 DIAGNOSIS — R06.02 SHORTNESS OF BREATH: ICD-10-CM

## 2022-09-15 DIAGNOSIS — R07.2 PRECORDIAL PAIN: ICD-10-CM

## 2022-09-15 DIAGNOSIS — I25.118 CORONARY ARTERY DISEASE OF NATIVE ARTERY OF NATIVE HEART WITH STABLE ANGINA PECTORIS: ICD-10-CM

## 2022-09-15 DIAGNOSIS — R94.39 ABNORMAL NUCLEAR STRESS TEST: ICD-10-CM

## 2022-09-15 LAB
ANION GAP SERPL CALCULATED.3IONS-SCNC: 13.2 MMOL/L (ref 5–15)
BASOPHILS # BLD AUTO: 0.07 10*3/MM3 (ref 0–0.2)
BASOPHILS NFR BLD AUTO: 0.6 % (ref 0–1.5)
BUN SERPL-MCNC: 12 MG/DL (ref 8–23)
BUN/CREAT SERPL: 13.8 (ref 7–25)
CALCIUM SPEC-SCNC: 9.1 MG/DL (ref 8.6–10.5)
CHLORIDE SERPL-SCNC: 103 MMOL/L (ref 98–107)
CO2 SERPL-SCNC: 20.8 MMOL/L (ref 22–29)
CREAT SERPL-MCNC: 0.87 MG/DL (ref 0.76–1.27)
DEPRECATED RDW RBC AUTO: 43.4 FL (ref 37–54)
EGFRCR SERPLBLD CKD-EPI 2021: 98.8 ML/MIN/1.73
EOSINOPHIL # BLD AUTO: 0.46 10*3/MM3 (ref 0–0.4)
EOSINOPHIL NFR BLD AUTO: 3.9 % (ref 0.3–6.2)
ERYTHROCYTE [DISTWIDTH] IN BLOOD BY AUTOMATED COUNT: 13.2 % (ref 12.3–15.4)
GLUCOSE SERPL-MCNC: 120 MG/DL (ref 65–99)
HCT VFR BLD AUTO: 43.7 % (ref 37.5–51)
HGB BLD-MCNC: 14.1 G/DL (ref 13–17.7)
IMM GRANULOCYTES # BLD AUTO: 0.05 10*3/MM3 (ref 0–0.05)
IMM GRANULOCYTES NFR BLD AUTO: 0.4 % (ref 0–0.5)
LYMPHOCYTES # BLD AUTO: 3.13 10*3/MM3 (ref 0.7–3.1)
LYMPHOCYTES NFR BLD AUTO: 26.9 % (ref 19.6–45.3)
MCH RBC QN AUTO: 28.8 PG (ref 26.6–33)
MCHC RBC AUTO-ENTMCNC: 32.3 G/DL (ref 31.5–35.7)
MCV RBC AUTO: 89.4 FL (ref 79–97)
MONOCYTES # BLD AUTO: 0.83 10*3/MM3 (ref 0.1–0.9)
MONOCYTES NFR BLD AUTO: 7.1 % (ref 5–12)
NEUTROPHILS NFR BLD AUTO: 61.1 % (ref 42.7–76)
NEUTROPHILS NFR BLD AUTO: 7.11 10*3/MM3 (ref 1.7–7)
NRBC BLD AUTO-RTO: 0 /100 WBC (ref 0–0.2)
PLATELET # BLD AUTO: 245 10*3/MM3 (ref 140–450)
PMV BLD AUTO: 11.3 FL (ref 6–12)
POTASSIUM SERPL-SCNC: 3.4 MMOL/L (ref 3.5–5.2)
RBC # BLD AUTO: 4.89 10*6/MM3 (ref 4.14–5.8)
SODIUM SERPL-SCNC: 137 MMOL/L (ref 136–145)
WBC NRBC COR # BLD: 11.65 10*3/MM3 (ref 3.4–10.8)

## 2022-09-15 PROCEDURE — 78452 HT MUSCLE IMAGE SPECT MULT: CPT | Performed by: INTERNAL MEDICINE

## 2022-09-15 PROCEDURE — 93017 CV STRESS TEST TRACING ONLY: CPT

## 2022-09-15 PROCEDURE — A9500 TC99M SESTAMIBI: HCPCS | Performed by: INTERNAL MEDICINE

## 2022-09-15 PROCEDURE — 85025 COMPLETE CBC W/AUTO DIFF WBC: CPT | Performed by: PHYSICIAN ASSISTANT

## 2022-09-15 PROCEDURE — 93018 CV STRESS TEST I&R ONLY: CPT | Performed by: INTERNAL MEDICINE

## 2022-09-15 PROCEDURE — 25010000002 REGADENOSON 0.4 MG/5ML SOLUTION: Performed by: INTERNAL MEDICINE

## 2022-09-15 PROCEDURE — 0 TECHNETIUM SESTAMIBI: Performed by: INTERNAL MEDICINE

## 2022-09-15 PROCEDURE — 78452 HT MUSCLE IMAGE SPECT MULT: CPT

## 2022-09-15 PROCEDURE — 25010000002 AMINOPHYLLINE PER 250 MG: Performed by: INTERNAL MEDICINE

## 2022-09-15 PROCEDURE — 80048 BASIC METABOLIC PNL TOTAL CA: CPT | Performed by: PHYSICIAN ASSISTANT

## 2022-09-15 RX ORDER — AMINOPHYLLINE DIHYDRATE 25 MG/ML
125 INJECTION, SOLUTION INTRAVENOUS
Status: COMPLETED | OUTPATIENT
Start: 2022-09-15 | End: 2022-09-15

## 2022-09-15 RX ADMIN — REGADENOSON 0.4 MG: 0.08 INJECTION, SOLUTION INTRAVENOUS at 11:50

## 2022-09-15 RX ADMIN — TECHNETIUM TC 99M SESTAMIBI 1 DOSE: 1 INJECTION INTRAVENOUS at 10:09

## 2022-09-15 RX ADMIN — AMINOPHYLLINE 125 MG: 25 INJECTION, SOLUTION INTRAVENOUS at 11:51

## 2022-09-15 RX ADMIN — TECHNETIUM TC 99M SESTAMIBI 1 DOSE: 1 INJECTION INTRAVENOUS at 11:51

## 2022-09-19 ENCOUNTER — TELEPHONE (OUTPATIENT)
Dept: CARDIOLOGY | Facility: CLINIC | Age: 62
End: 2022-09-19

## 2022-09-19 DIAGNOSIS — R06.02 SHORTNESS OF BREATH: Primary | ICD-10-CM

## 2022-09-19 DIAGNOSIS — I10 PRIMARY HYPERTENSION: ICD-10-CM

## 2022-09-19 NOTE — TELEPHONE ENCOUNTER
Patient aware and will increase potassium for 5 days and repeat labs in 7-10 days.     ----- Message from MARBIN Muñoz sent at 9/16/2022  3:05 PM EDT -----  Increase potassium to 20 mEq x 5 days then return to prior dosing.  Repeat BMP and magnesium in 7 to 10 days.

## 2022-09-21 LAB
BH CV REST NUCLEAR ISOTOPE DOSE: 10 MCI
BH CV STRESS COMMENTS STAGE 1: NORMAL
BH CV STRESS DOSE REGADENOSON STAGE 1: 0.4
BH CV STRESS DURATION MIN STAGE 1: 0
BH CV STRESS DURATION SEC STAGE 1: 10
BH CV STRESS NUCLEAR ISOTOPE DOSE: 30 MCI
BH CV STRESS PROTOCOL 1: NORMAL
BH CV STRESS RECOVERY BP: NORMAL MMHG
BH CV STRESS RECOVERY HR: 56 BPM
BH CV STRESS STAGE 1: 1
MAXIMAL PREDICTED HEART RATE: 160 BPM
PERCENT MAX PREDICTED HR: 42.5 %
STRESS BASELINE BP: NORMAL MMHG
STRESS BASELINE HR: 48 BPM
STRESS PERCENT HR: 50 %
STRESS POST PEAK BP: NORMAL MMHG
STRESS POST PEAK HR: 68 BPM
STRESS TARGET HR: 136 BPM

## 2022-09-21 RX ORDER — POTASSIUM CHLORIDE 750 MG/1
10 TABLET, FILM COATED, EXTENDED RELEASE ORAL DAILY
Qty: 30 TABLET | Refills: 2 | Status: SHIPPED | OUTPATIENT
Start: 2022-09-21 | End: 2023-01-16

## 2022-09-21 RX ORDER — FUROSEMIDE 20 MG/1
20 TABLET ORAL DAILY
Qty: 30 TABLET | Refills: 2 | Status: SHIPPED | OUTPATIENT
Start: 2022-09-21 | End: 2023-01-16

## 2022-09-21 NOTE — TELEPHONE ENCOUNTER
Sharon called office stating patient only received 5 days of Lasix 40 mg and potassium 10 meq. Per Josh ZAZUETA send in Lasix 30 mg po daily and Potassium 10 meq daily. Lauren LOPEZ

## 2022-09-29 ENCOUNTER — TELEPHONE (OUTPATIENT)
Dept: CARDIOLOGY | Facility: CLINIC | Age: 62
End: 2022-09-29

## 2022-09-29 NOTE — TELEPHONE ENCOUNTER
Otilia, on hipaa, made aware as patient not available.  She reports discomfort from CABG especially when coughing. She will inform patient and and he will call with any ongoing symptoms. Diana Pike MA      ----- Message from Pat Ying MA sent at 9/23/2022 12:25 PM EDT -----    ----- Message -----  From: Josh Kennedy PA  Sent: 9/23/2022   9:04 AM EDT  To: Pat Ying MA    Mild ischemic defect questioned.  This is not present on polar maps.  Treat medically.  If ongoing symptoms, please see me.    Result Text  1.  Scintigraphy is suggestive of a very mild ischemic defect of small to moderate sized confined to the inferolateral wall.  This defect did not meet quantitative criteria for significance on polar maps.     2.  Preserved post stress ejection fraction of 62% with no focal wall motion abnormalities.     3.  No evidence of pharmacologically induced transient ischemic dilation or of increased lung uptake of radiopharmaceutical.

## 2022-10-08 NOTE — STS RISK SCORE
STS Adult Cardiac Surgery Database Version 4.20  RISK SCORES  Procedure: Isolated CAB  Risk of Mortality:  0.345%  Renal Failure:  0.481%  Permanent Stroke:  0.441%  Prolonged Ventilation:  2.080%  DSW Infection:  0.162%  Reoperation:  1.352%  Morbidity or Mortality:  3.510%  Short Length of Stay:  74.208%  Long Length of Stay:  1.142%      STS risk score has been calculated and discussed with the patient/family.        asthma exacerbation

## 2022-12-02 ENCOUNTER — TELEPHONE (OUTPATIENT)
Dept: CARDIOLOGY | Facility: CLINIC | Age: 62
End: 2022-12-02

## 2022-12-02 NOTE — TELEPHONE ENCOUNTER
I CALLED AND SPOKE WITH TAYLER ESTEVEZ NURSE. SHE STATES IT WAS REVIEWED TODAY, AND IS PLANNING TO MAKE RECOMMENDATIONS, TO MAKE PATIENT AWARE WILL BE CALLED ONCE SHE ADDRESSES WITH HER.    PATIENTS WIFE ON HIPPA AWARE.

## 2022-12-02 NOTE — TELEPHONE ENCOUNTER
----- Message from MARBIN Muñoz sent at 12/2/2022  9:09 AM EST -----  Have lipids been addressed?  ----- Message -----  From: Isma Vazquez Incoming  Sent: 11/30/2022   5:12 AM EST  To: MARBIN Muñoz

## 2023-01-16 RX ORDER — FUROSEMIDE 20 MG/1
TABLET ORAL
Qty: 90 TABLET | Refills: 0 | Status: SHIPPED | OUTPATIENT
Start: 2023-01-16

## 2023-01-16 RX ORDER — POTASSIUM CHLORIDE 750 MG/1
TABLET, FILM COATED, EXTENDED RELEASE ORAL
Qty: 30 TABLET | Refills: 0 | Status: SHIPPED | OUTPATIENT
Start: 2023-01-16 | End: 2023-03-13

## 2023-02-13 ENCOUNTER — OFFICE VISIT (OUTPATIENT)
Dept: CARDIOLOGY | Facility: CLINIC | Age: 63
End: 2023-02-13
Payer: MEDICARE

## 2023-02-13 VITALS
WEIGHT: 223 LBS | HEART RATE: 98 BPM | OXYGEN SATURATION: 96 % | SYSTOLIC BLOOD PRESSURE: 130 MMHG | DIASTOLIC BLOOD PRESSURE: 73 MMHG | HEIGHT: 67 IN | BODY MASS INDEX: 35 KG/M2

## 2023-02-13 DIAGNOSIS — R06.09 DYSPNEA ON EXERTION: ICD-10-CM

## 2023-02-13 DIAGNOSIS — I10 PRIMARY HYPERTENSION: ICD-10-CM

## 2023-02-13 DIAGNOSIS — I25.10 CORONARY ARTERY DISEASE INVOLVING NATIVE CORONARY ARTERY OF NATIVE HEART WITHOUT ANGINA PECTORIS: Primary | ICD-10-CM

## 2023-02-13 PROCEDURE — 99213 OFFICE O/P EST LOW 20 MIN: CPT | Performed by: PHYSICIAN ASSISTANT

## 2023-02-13 NOTE — PROGRESS NOTES
Subjective   Josh Horan is a 62 y.o. male     Chief Complaint   Patient presents with   • Follow-up     Coronary artery disease     Problem list:  1.  Coronary artery disease.  1.1.  Coronary artery bypass graft performed, 7/12/2022.  The patient was grafted with LIMA to LAD, vein graft to the right, vein graft to the diagonal, and vein graft sequenced between OM1 and OM 2.  The patient had concurrent ligation of the left atrial appendage with size 35 atrial clip.  2.  Postop A. fib.  3.  Hypertension  4.  Dyslipidemia  5.  Hypothyroidism.  6.  Bipolar disorder.  HPI    The patient presents in the clinic today for routine evaluation and follow-up.  He did have bypass in July.  He had a difficult postop course complicated with postop A-fib and congestive heart failure.  He was placed on diuretic therapy for his postop failure.  He responded well to that and diuresed appropriately.  He tells me he is up at least 10 to 15 pounds at this time.  He does not feel that that is fluid.  He never started cardiac rehab as recommended.  He really is done no activity otherwise.  He feels that this is all weight.  I detect no evidence of failure by exam today.  He denies angina and reports that his chest wall pain is really minimizing.  He has no dysrhythmic symptoms.  He has no further complaints at this time.    Current Outpatient Medications   Medication Sig Dispense Refill   • aspirin  MG tablet Take 1 tablet by mouth Daily. 90 tablet 3   • atorvastatin (LIPITOR) 40 MG tablet Take 1 tablet by mouth Every Night. 90 tablet 3   • busPIRone (BUSPAR) 10 MG tablet Take 10 mg by mouth 3 (Three) Times a Day.     • DULoxetine (CYMBALTA) 60 MG capsule Take 60 mg by mouth Daily.     • eszopiclone (LUNESTA) 1 MG tablet Take 1 mg by mouth Every Night. Take immediately before bedtime     • ibuprofen (ADVIL,MOTRIN) 800 MG tablet Take 800 mg by mouth Every 6 (Six) Hours As Needed for Mild Pain .     • levothyroxine (SYNTHROID,  LEVOTHROID) 100 MCG tablet Take 100 mcg by mouth Daily.     • losartan (COZAAR) 100 MG tablet Take 0.5 tablets by mouth Daily. 30 tablet 3   • nitroglycerin (NITROSTAT) 0.4 MG SL tablet 1 under the tongue as needed for angina, may repeat q5mins for up three doses 25 tablet 3   • omeprazole (priLOSEC) 40 MG capsule Take 40 mg by mouth Daily.     • pharmacy consult - MTM Daily.     • potassium chloride 10 MEQ CR tablet Take 1 tablet by mouth once daily 30 tablet 0   • vitamin B-12 (CYANOCOBALAMIN) 1000 MCG tablet Take 1,000 mcg by mouth Daily.     • vitamin D (ERGOCALCIFEROL) 1.25 MG (21424 UT) capsule capsule Take 50,000 Units by mouth 1 (One) Time Per Week.     • amiodarone (PACERONE) 200 MG tablet Take 1 tablet by mouth Daily. Patient is to take 200mg twice daily until July 28th and then 200mg once daily thereafter. 90 tablet 3   • cetirizine (zyrTEC) 10 MG tablet Take 10 mg by mouth Daily.     • furosemide (LASIX) 20 MG tablet Take 1 tablet by mouth once daily 90 tablet 0   • metoprolol tartrate (LOPRESSOR) 50 MG tablet Take 1 tablet by mouth Every 12 (Twelve) Hours. 60 tablet 3   • tiZANidine (ZANAFLEX) 4 MG tablet Take 4 mg by mouth 2 (Two) Times a Day As Needed for Muscle Spasms.       No current facility-administered medications for this visit.       Adhesive tape    Past Medical History:   Diagnosis Date   • Acid reflux    • Anxiety    • Arthritis    • Back pain    • Bipolar 1 disorder (HCC)    • Coronary artery disease    • Depression    • Headache    • Hiatal hernia    • Hyperlipidemia    • Hypertension    • Hyperthyroidism    • Hypothyroidism    • Lyme disease    • Migraine    • MRSA infection     1.5-2 years ago- back   • Panic attacks    • PTSD (post-traumatic stress disorder)    • Sciatic nerve pain    • Seasonal allergies    • Sleep apnea     doesnt use machine   • Tinnitus    • Wears glasses        Social History     Socioeconomic History   • Marital status: Single   • Number of children: 2   Tobacco  "Use   • Smoking status: Never   • Smokeless tobacco: Never   Vaping Use   • Vaping Use: Never used   Substance and Sexual Activity   • Alcohol use: Never   • Drug use: Yes     Frequency: 7.0 times per week     Types: Marijuana   • Sexual activity: Defer       Family History   Problem Relation Age of Onset   • Hypertension Mother    • Heart disease Mother    • Cancer Mother    • Heart disease Father    • Heart attack Father        Review of Systems   Constitutional: Negative.  Negative for activity change, appetite change, chills and fatigue.   HENT: Negative for congestion.    Eyes: Positive for visual disturbance.   Respiratory: Positive for apnea. Negative for cough, chest tightness, shortness of breath and wheezing.    Cardiovascular: Positive for palpitations. Negative for chest pain and leg swelling.   Gastrointestinal: Negative.  Negative for blood in stool.   Endocrine: Negative.  Negative for cold intolerance and heat intolerance.   Genitourinary: Negative.  Negative for hematuria.   Musculoskeletal: Positive for arthralgias. Negative for back pain, gait problem, joint swelling, neck pain and neck stiffness.   Skin: Negative.  Negative for color change, rash and wound.   Allergic/Immunologic: Negative.  Negative for environmental allergies and food allergies.   Neurological: Positive for headaches. Negative for dizziness, syncope, weakness, light-headedness and numbness.   Hematological: Negative.  Does not bruise/bleed easily.   Psychiatric/Behavioral: Positive for sleep disturbance.       Objective     Vitals:    02/13/23 1619   BP: 130/73   BP Location: Left arm   Patient Position: Sitting   Cuff Size: Adult   Pulse: 98   SpO2: 96%   Weight: 101 kg (223 lb)   Height: 170.2 cm (67\")        /73 (BP Location: Left arm, Patient Position: Sitting, Cuff Size: Adult)   Pulse 98   Ht 170.2 cm (67\")   Wt 101 kg (223 lb)   SpO2 96%   BMI 34.93 kg/m²      Lab Results (most recent)     None    "       Physical Exam  Vitals and nursing note reviewed.   Constitutional:       General: He is not in acute distress.     Appearance: He is well-developed.   HENT:      Head: Normocephalic and atraumatic.   Eyes:      Conjunctiva/sclera: Conjunctivae normal.      Pupils: Pupils are equal, round, and reactive to light.   Neck:      Vascular: No JVD.      Trachea: No tracheal deviation.   Cardiovascular:      Rate and Rhythm: Normal rate and regular rhythm.      Heart sounds: Normal heart sounds.   Pulmonary:      Effort: Pulmonary effort is normal.      Breath sounds: Normal breath sounds.   Abdominal:      General: Bowel sounds are normal. There is no distension.      Palpations: Abdomen is soft. There is no mass.      Tenderness: There is no abdominal tenderness. There is no guarding or rebound.   Musculoskeletal:         General: No tenderness or deformity. Normal range of motion.      Cervical back: Normal range of motion and neck supple.   Skin:     General: Skin is warm and dry.      Coloration: Skin is not pale.      Findings: No erythema or rash.   Neurological:      Mental Status: He is alert and oriented to person, place, and time.   Psychiatric:         Behavior: Behavior normal.         Thought Content: Thought content normal.         Judgment: Judgment normal.         Procedure   Procedures         Assessment & Plan      Diagnosis Plan   1. Coronary artery disease involving native coronary artery of native heart without angina pectoris        2. Dyspnea on exertion        3. Primary hypertension            1.  At this time, the patient appears to be doing fairly well.  He appears to be steadily improving post bypass.  He did have failure postoperatively and was started on diuretic therapy.  He now utilizes that as needed.  We have discussed with him in detail how to appropriately titrate that at home.  He understands appropriate titration schedule.    2.  I feel that he is on appropriate medications for  now.  I will make no adjustments in the same.    3.  He did have a CTA PE protocol and echo after last evaluation.  CTA with PE protocol indicates no evidence of pulmonary embolism.  It did support pulmonary edema which is now responded well to diuretic therapy.  Echo supported preserved systolic function, pericardial effusion which was not with tamponade physiology, and stable structure otherwise.  As the patient is clinically stable, I do not feel further evaluation is warranted.    4.  We will see him routinely through the clinic.  He will call immediately for any issues.  As he is doing well, nothing further.            Patient brought in medicine list to appointment, it's been reviewed with patient and med list was updated in the chart.                     Electronically signed by:

## 2023-03-13 RX ORDER — POTASSIUM CHLORIDE 750 MG/1
TABLET, FILM COATED, EXTENDED RELEASE ORAL
Qty: 30 TABLET | Refills: 0 | Status: SHIPPED | OUTPATIENT
Start: 2023-03-13

## 2023-05-15 RX ORDER — POTASSIUM CHLORIDE 750 MG/1
TABLET, FILM COATED, EXTENDED RELEASE ORAL
Qty: 30 TABLET | Refills: 0 | Status: SHIPPED | OUTPATIENT
Start: 2023-05-15

## 2024-02-15 ENCOUNTER — OFFICE VISIT (OUTPATIENT)
Dept: CARDIOLOGY | Facility: CLINIC | Age: 64
End: 2024-02-15
Payer: MEDICARE

## 2024-02-15 VITALS
HEIGHT: 67 IN | DIASTOLIC BLOOD PRESSURE: 72 MMHG | SYSTOLIC BLOOD PRESSURE: 123 MMHG | HEART RATE: 58 BPM | WEIGHT: 229.4 LBS | BODY MASS INDEX: 36 KG/M2

## 2024-02-15 DIAGNOSIS — I25.118 CORONARY ARTERY DISEASE OF NATIVE ARTERY OF NATIVE HEART WITH STABLE ANGINA PECTORIS: Primary | ICD-10-CM

## 2024-02-15 DIAGNOSIS — I10 PRIMARY HYPERTENSION: ICD-10-CM

## 2024-02-15 DIAGNOSIS — R06.09 DYSPNEA ON EXERTION: ICD-10-CM

## 2024-02-15 PROCEDURE — 1160F RVW MEDS BY RX/DR IN RCRD: CPT | Performed by: PHYSICIAN ASSISTANT

## 2024-02-15 PROCEDURE — 1159F MED LIST DOCD IN RCRD: CPT | Performed by: PHYSICIAN ASSISTANT

## 2024-02-15 PROCEDURE — 3074F SYST BP LT 130 MM HG: CPT | Performed by: PHYSICIAN ASSISTANT

## 2024-02-15 PROCEDURE — 93000 ELECTROCARDIOGRAM COMPLETE: CPT | Performed by: PHYSICIAN ASSISTANT

## 2024-02-15 PROCEDURE — 99213 OFFICE O/P EST LOW 20 MIN: CPT | Performed by: PHYSICIAN ASSISTANT

## 2024-02-15 PROCEDURE — 3078F DIAST BP <80 MM HG: CPT | Performed by: PHYSICIAN ASSISTANT

## 2024-02-15 RX ORDER — SUVOREXANT 20 MG/1
20 TABLET, FILM COATED ORAL NIGHTLY
COMMUNITY

## 2024-02-15 RX ORDER — HYDROXYZINE HYDROCHLORIDE 25 MG/1
1 TABLET, FILM COATED ORAL DAILY
COMMUNITY
Start: 2023-10-23 | End: 2024-02-15 | Stop reason: SDUPTHER

## 2024-02-15 RX ORDER — HYDROXYZINE HYDROCHLORIDE 25 MG/1
25 TABLET, FILM COATED ORAL DAILY
Qty: 30 TABLET | Refills: 2 | Status: SHIPPED | OUTPATIENT
Start: 2024-02-15

## 2024-02-20 ENCOUNTER — TELEPHONE (OUTPATIENT)
Dept: CARDIOLOGY | Facility: CLINIC | Age: 64
End: 2024-02-20
Payer: MEDICARE

## 2024-02-20 NOTE — TELEPHONE ENCOUNTER
Caller: JESUS YIN    Relationship: WIFE    Best call back number: [phone]    What is the best time to reach you: ANY    What was the call regarding: INQUIRING ABOUT A 2ND MEDICATION PRESCRIPTION THAT WAS NEVER SENT. PLEASE CALL THE ABOVE TO DISCUSS    Is it okay if the provider responds through MyChart: NO

## 2024-02-20 NOTE — TELEPHONE ENCOUNTER
Called and spoke with Otilia who reports that when patient was seen on 2/15/24 a medication was stopped ( I see where amiodarone was discontinued) she states that he was going to be started on another medication that is usually started after heart surgery but was unable to recall the names of these medication's. Will forward to Josh for recommendation's.

## 2024-02-21 NOTE — TELEPHONE ENCOUNTER
Per Josh Kennedy pac obtain complete medication list from patient.    Called and spoke with Otilia who states will provide a list later today.

## 2024-02-22 RX ORDER — METOPROLOL TARTRATE 50 MG/1
25 TABLET, FILM COATED ORAL 2 TIMES DAILY
COMMUNITY

## 2024-02-22 NOTE — TELEPHONE ENCOUNTER
Received call back from Otilia with updated medication list as follows:  Losartan 50mg bid  Atorvastatin 40mg qd  Ibuprofen 800mg prn  Omeprazole 40mg qd  Potassium 10meq daily  Metoprolol 50mg bid  Duloxetine 60mg daily  Levothyroxine 100mcg daily  Co q 10 daily  Mvi daily  Aspirin 325mg daily  Delsomra 20mg qhs      Per Josh Kennedy pac decrease metoprolol to 25mg bid. Ensure that patient is not taking amiodarone.    Notified Otilia of recommendation's to decrease metoprolol to 50mg 1/2 tablet bid and do not take amiodarone. Verbalizes understanding of instruction's.

## 2024-02-22 NOTE — TELEPHONE ENCOUNTER
"Called to obtain complete medication list, no answer left voice message requesting return call.     Relay     \"Needing complete updated medication list from patient.\"          "

## 2024-03-25 ENCOUNTER — TELEPHONE (OUTPATIENT)
Dept: CARDIOLOGY | Facility: CLINIC | Age: 64
End: 2024-03-25
Payer: MEDICARE

## 2024-03-25 DIAGNOSIS — R07.2 PRECORDIAL PAIN: ICD-10-CM

## 2024-03-25 DIAGNOSIS — R06.09 DYSPNEA ON EXERTION: Primary | ICD-10-CM

## 2024-03-25 NOTE — TELEPHONE ENCOUNTER
Caller: JESUS YIN    Relationship to patient: Emergency Contact    Best call back number: [phone]    Patient is needing: PATIENT WAS UNDER THE IMPRESSION THAT HE WOULD NEED TO DO A STRESS TEST BEFORE HIS NEXT APPOINTMENT. HUB DID NOT SEE ORDERS FOR THIS IN THE CHART. PLEASE ADVISE.

## 2024-03-25 NOTE — TELEPHONE ENCOUNTER
Called and spoke with Otilia who I son HIPAA patient is requesting to go ahead with Stress and Echo (see note 2/15/24) orders placed. She is aware that once scheduled and approved with insurance they will get a call with appointment date and time.

## 2024-04-03 RX ORDER — LOSARTAN POTASSIUM 100 MG/1
50 TABLET ORAL DAILY
Qty: 90 TABLET | Refills: 3 | Status: SHIPPED | OUTPATIENT
Start: 2024-04-03

## 2024-04-12 ENCOUNTER — HOSPITAL ENCOUNTER (OUTPATIENT)
Dept: CARDIOLOGY | Facility: HOSPITAL | Age: 64
Discharge: HOME | End: 2024-04-12
Payer: MEDICARE

## 2024-04-12 DIAGNOSIS — R07.2 PRECORDIAL PAIN: ICD-10-CM

## 2024-04-12 DIAGNOSIS — R06.09 DYSPNEA ON EXERTION: ICD-10-CM

## 2024-04-12 LAB
AORTIC DIMENSIONLESS INDEX: 0.8 (DI)
BH CV ECHO MEAS - ACS: 2.1 CM
BH CV ECHO MEAS - AO MAX PG: 5.5 MMHG
BH CV ECHO MEAS - AO MEAN PG: 3 MMHG
BH CV ECHO MEAS - AO ROOT DIAM: 3.3 CM
BH CV ECHO MEAS - AO V2 MAX: 117 CM/SEC
BH CV ECHO MEAS - AO V2 VTI: 25.7 CM
BH CV ECHO MEAS - AVA(I,D): 2.7 CM2
BH CV ECHO MEAS - EDV(CUBED): 126.5 ML
BH CV ECHO MEAS - EDV(MOD-SP4): 89.7 ML
BH CV ECHO MEAS - EF(MOD-SP4): 40.1 %
BH CV ECHO MEAS - EF_3D-VOL: 53 %
BH CV ECHO MEAS - ESV(CUBED): 63 ML
BH CV ECHO MEAS - ESV(MOD-SP4): 53.7 ML
BH CV ECHO MEAS - FS: 20.7 %
BH CV ECHO MEAS - IVS/LVPW: 0.66 CM
BH CV ECHO MEAS - IVSD: 0.82 CM
BH CV ECHO MEAS - LA A2CS (ATRIAL LENGTH): 5.2 CM
BH CV ECHO MEAS - LA A4C LENGTH: 5.2 CM
BH CV ECHO MEAS - LA DIMENSION: 4.7 CM
BH CV ECHO MEAS - LAT PEAK E' VEL: 13.1 CM/SEC
BH CV ECHO MEAS - LV DIASTOLIC VOL/BSA (35-75): 41.9 CM2
BH CV ECHO MEAS - LV MASS(C)D: 190.9 GRAMS
BH CV ECHO MEAS - LV MAX PG: 3.1 MMHG
BH CV ECHO MEAS - LV MEAN PG: 2 MMHG
BH CV ECHO MEAS - LV SYSTOLIC VOL/BSA (12-30): 25.1 CM2
BH CV ECHO MEAS - LV V1 MAX: 87.6 CM/SEC
BH CV ECHO MEAS - LV V1 VTI: 20.4 CM
BH CV ECHO MEAS - LVIDD: 5 CM
BH CV ECHO MEAS - LVIDS: 4 CM
BH CV ECHO MEAS - LVOT AREA: 3.5 CM2
BH CV ECHO MEAS - LVOT DIAM: 2.1 CM
BH CV ECHO MEAS - LVPWD: 1.24 CM
BH CV ECHO MEAS - MED PEAK E' VEL: 7.4 CM/SEC
BH CV ECHO MEAS - MR MAX PG: 84.3 MMHG
BH CV ECHO MEAS - MR MAX VEL: 459 CM/SEC
BH CV ECHO MEAS - MV A MAX VEL: 60.4 CM/SEC
BH CV ECHO MEAS - MV DEC TIME: 0.16 SEC
BH CV ECHO MEAS - MV E MAX VEL: 82.1 CM/SEC
BH CV ECHO MEAS - MV E/A: 1.36
BH CV ECHO MEAS - PA V2 MAX: 118 CM/SEC
BH CV ECHO MEAS - RAP SYSTOLE: 10 MMHG
BH CV ECHO MEAS - RV MAX PG: 1.59 MMHG
BH CV ECHO MEAS - RV V1 MAX: 63.1 CM/SEC
BH CV ECHO MEAS - RV V1 VTI: 14.9 CM
BH CV ECHO MEAS - RVSP: 36.6 MMHG
BH CV ECHO MEAS - SI(MOD-SP4): 16.8 ML/M2
BH CV ECHO MEAS - SV(LVOT): 70.7 ML
BH CV ECHO MEAS - SV(MOD-SP4): 36 ML
BH CV ECHO MEAS - TAPSE (>1.6): 1.91 CM
BH CV ECHO MEAS - TR MAX PG: 26.6 MMHG
BH CV ECHO MEAS - TR MAX VEL: 258 CM/SEC
BH CV ECHO MEASUREMENTS AVERAGE E/E' RATIO: 8.01
BH CV REST NUCLEAR ISOTOPE DOSE: 10 MCI
BH CV STRESS COMMENTS STAGE 1: NORMAL
BH CV STRESS DOSE REGADENOSON STAGE 1: 0.4
BH CV STRESS DURATION MIN STAGE 1: 0
BH CV STRESS DURATION SEC STAGE 1: 10
BH CV STRESS NUCLEAR ISOTOPE DOSE: 30 MCI
BH CV STRESS PROTOCOL 1: NORMAL
BH CV STRESS RECOVERY BP: NORMAL MMHG
BH CV STRESS RECOVERY HR: 62 BPM
BH CV STRESS STAGE 1: 1
BH CV XLRA - RV BASE: 2.6 CM
BH CV XLRA - RV LENGTH: 5.9 CM
BH CV XLRA - RV MID: 2.8 CM
LEFT ATRIUM VOLUME INDEX: 17.7 ML/M2
LEFT ATRIUM VOLUME: 38 ML
MAXIMAL PREDICTED HEART RATE: 159 BPM
PERCENT MAX PREDICTED HR: 61.64 %
STRESS BASELINE BP: NORMAL MMHG
STRESS BASELINE HR: 57 BPM
STRESS PERCENT HR: 73 %
STRESS POST PEAK BP: NORMAL MMHG
STRESS POST PEAK HR: 98 BPM
STRESS TARGET HR: 135 BPM

## 2024-04-12 PROCEDURE — A9500 TC99M SESTAMIBI: HCPCS | Performed by: INTERNAL MEDICINE

## 2024-04-12 PROCEDURE — 0 TECHNETIUM SESTAMIBI: Performed by: INTERNAL MEDICINE

## 2024-04-12 PROCEDURE — 93306 TTE W/DOPPLER COMPLETE: CPT

## 2024-04-12 PROCEDURE — 93017 CV STRESS TEST TRACING ONLY: CPT

## 2024-04-12 PROCEDURE — 78452 HT MUSCLE IMAGE SPECT MULT: CPT

## 2024-04-12 PROCEDURE — 25010000002 REGADENOSON 0.4 MG/5ML SOLUTION: Performed by: INTERNAL MEDICINE

## 2024-04-12 RX ORDER — REGADENOSON 0.08 MG/ML
0.4 INJECTION, SOLUTION INTRAVENOUS
Status: COMPLETED | OUTPATIENT
Start: 2024-04-12 | End: 2024-04-12

## 2024-04-12 RX ADMIN — TECHNETIUM TC 99M SESTAMIBI 1 DOSE: 1 INJECTION INTRAVENOUS at 11:23

## 2024-04-12 RX ADMIN — REGADENOSON 0.4 MG: 0.08 INJECTION, SOLUTION INTRAVENOUS at 11:22

## 2024-04-12 RX ADMIN — TECHNETIUM TC 99M SESTAMIBI 1 DOSE: 1 INJECTION INTRAVENOUS at 09:12

## 2024-04-16 LAB
AORTIC DIMENSIONLESS INDEX: 0.8 (DI)
BH CV ECHO MEAS - ACS: 2.1 CM
BH CV ECHO MEAS - AO MAX PG: 5.5 MMHG
BH CV ECHO MEAS - AO MEAN PG: 3 MMHG
BH CV ECHO MEAS - AO ROOT DIAM: 3.3 CM
BH CV ECHO MEAS - AO V2 MAX: 117 CM/SEC
BH CV ECHO MEAS - AO V2 VTI: 25.7 CM
BH CV ECHO MEAS - AVA(I,D): 2.7 CM2
BH CV ECHO MEAS - EDV(CUBED): 126.5 ML
BH CV ECHO MEAS - EDV(MOD-SP4): 89.7 ML
BH CV ECHO MEAS - EF(MOD-SP4): 40.1 %
BH CV ECHO MEAS - EF_3D-VOL: 53 %
BH CV ECHO MEAS - ESV(CUBED): 63 ML
BH CV ECHO MEAS - ESV(MOD-SP4): 53.7 ML
BH CV ECHO MEAS - FS: 20.7 %
BH CV ECHO MEAS - IVS/LVPW: 0.66 CM
BH CV ECHO MEAS - IVSD: 0.82 CM
BH CV ECHO MEAS - LA A2CS (ATRIAL LENGTH): 5.2 CM
BH CV ECHO MEAS - LA A4C LENGTH: 5.2 CM
BH CV ECHO MEAS - LA DIMENSION: 4.7 CM
BH CV ECHO MEAS - LAT PEAK E' VEL: 13.1 CM/SEC
BH CV ECHO MEAS - LV DIASTOLIC VOL/BSA (35-75): 41.9 CM2
BH CV ECHO MEAS - LV MASS(C)D: 190.9 GRAMS
BH CV ECHO MEAS - LV MAX PG: 3.1 MMHG
BH CV ECHO MEAS - LV MEAN PG: 2 MMHG
BH CV ECHO MEAS - LV SYSTOLIC VOL/BSA (12-30): 25.1 CM2
BH CV ECHO MEAS - LV V1 MAX: 87.6 CM/SEC
BH CV ECHO MEAS - LV V1 VTI: 20.4 CM
BH CV ECHO MEAS - LVIDD: 5 CM
BH CV ECHO MEAS - LVIDS: 4 CM
BH CV ECHO MEAS - LVOT AREA: 3.5 CM2
BH CV ECHO MEAS - LVOT DIAM: 2.1 CM
BH CV ECHO MEAS - LVPWD: 1.24 CM
BH CV ECHO MEAS - MED PEAK E' VEL: 7.4 CM/SEC
BH CV ECHO MEAS - MR MAX PG: 84.3 MMHG
BH CV ECHO MEAS - MR MAX VEL: 459 CM/SEC
BH CV ECHO MEAS - MV A MAX VEL: 60.4 CM/SEC
BH CV ECHO MEAS - MV DEC TIME: 0.16 SEC
BH CV ECHO MEAS - MV E MAX VEL: 82.1 CM/SEC
BH CV ECHO MEAS - MV E/A: 1.36
BH CV ECHO MEAS - PA V2 MAX: 118 CM/SEC
BH CV ECHO MEAS - RAP SYSTOLE: 10 MMHG
BH CV ECHO MEAS - RV MAX PG: 1.59 MMHG
BH CV ECHO MEAS - RV V1 MAX: 63.1 CM/SEC
BH CV ECHO MEAS - RV V1 VTI: 14.9 CM
BH CV ECHO MEAS - RVSP: 36.6 MMHG
BH CV ECHO MEAS - SI(MOD-SP4): 16.8 ML/M2
BH CV ECHO MEAS - SV(LVOT): 70.7 ML
BH CV ECHO MEAS - SV(MOD-SP4): 36 ML
BH CV ECHO MEAS - TAPSE (>1.6): 1.91 CM
BH CV ECHO MEAS - TR MAX PG: 26.6 MMHG
BH CV ECHO MEAS - TR MAX VEL: 258 CM/SEC
BH CV ECHO MEASUREMENTS AVERAGE E/E' RATIO: 8.01
BH CV XLRA - RV BASE: 2.6 CM
BH CV XLRA - RV LENGTH: 5.9 CM
BH CV XLRA - RV MID: 2.8 CM
LEFT ATRIUM VOLUME INDEX: 17.7 ML/M2
LEFT ATRIUM VOLUME: 38 ML

## 2024-04-21 LAB
BH CV REST NUCLEAR ISOTOPE DOSE: 10 MCI
BH CV STRESS COMMENTS STAGE 1: NORMAL
BH CV STRESS DOSE REGADENOSON STAGE 1: 0.4
BH CV STRESS DURATION MIN STAGE 1: 0
BH CV STRESS DURATION SEC STAGE 1: 10
BH CV STRESS NUCLEAR ISOTOPE DOSE: 30 MCI
BH CV STRESS PROTOCOL 1: NORMAL
BH CV STRESS RECOVERY BP: NORMAL MMHG
BH CV STRESS RECOVERY HR: 62 BPM
BH CV STRESS STAGE 1: 1
MAXIMAL PREDICTED HEART RATE: 159 BPM
PERCENT MAX PREDICTED HR: 61.64 %
STRESS BASELINE BP: NORMAL MMHG
STRESS BASELINE HR: 57 BPM
STRESS PERCENT HR: 73 %
STRESS POST PEAK BP: NORMAL MMHG
STRESS POST PEAK HR: 98 BPM
STRESS TARGET HR: 135 BPM

## 2024-04-23 ENCOUNTER — TELEPHONE (OUTPATIENT)
Dept: CARDIOLOGY | Facility: CLINIC | Age: 64
End: 2024-04-23
Payer: MEDICARE

## 2024-04-23 NOTE — TELEPHONE ENCOUNTER
----- Message from MARBIN Muñoz sent at 4/21/2024 10:38 PM EDT -----  No high risk markers noted.  If symptoms, follow-up to discuss further evaluation.  Medical therapy otherwise.  If he is concerned and would like to discuss results, we can have him follow-up.  ----- Message -----  From: Baldomero Salguero MD  Sent: 4/21/2024   5:06 PM EDT  To: MARBIN Muñoz    Stress Test With Myocardial Perfusion One Day  Order: [id number]  Status: Final result       Visible to patient: Yes (seen)       Dx: Precordial pain; Dyspnea on exertion    0 Result Notes  Details    Reading Physician Reading Date Result Priority   Baldomero Salguero MD  [phone] 4/21/2024 Routine     Result Text  1.  Scintigraphy demonstrates a small but moderately dense and nearly completely reversible defect involving predominantly the inferoseptal wall but the diaphragmatic segment to a lesser degree.     2.  Preserved post stress ejection fraction of 64% with no focal wall motion abnormalities.     3.  No findings to suggest multivessel coronary disease or increased LV filling pressures.        [facility] [place]   Josh Kennedy PA Worley, Lois J, MA  Systolic function is stable.  Continue medical therapy directed towards the same.  Routine follow-up unless symptoms indicate otherwise.          Previous Messages    Adult Transthoracic Echo Complete W/ Cont if Necessary Per Protocol  Order: [id number]  Status: Final result       Visible to patient: Yes (seen)       Dx: Precordial pain; Dyspnea on exertion    0 Result Notes  Details    Reading Physician Reading Date Result Priority   Baldomero Salguero MD  [phone] 4/16/2024 Routine     Result Text       Estimated right ventricular systolic pressure from tricuspid regurgitation is mildly elevated (35-45 mmHg).     Physician interpretation     Mildly technically limited study.     1.  The left ventricle is at the upper limits of normal size and global LV  systolic function is in the low normal to mildly depressed range.  There may be septal and anteroseptal hypokinesis.  Normal LV wall thickness.  Visually estimated ejection fraction is 45 to 50%.  Grade 1A diastolic dysfunction.  Mild left atrial enlargement.  The right ventricle appears mildly dilated but RV systolic function is grossly preserved.  No septal defect or intracavitary mass or thrombus.     2.  Valves are morphologically normal with no stenotic lesions or valve associated masses or thrombi.  There is trivial MR and TR.     3.  No pericardial or great vessel pathology.     4.  Right heart pressures cannot be assuredly calculated.  Based on the suboptimal jet of TR on CW Doppler sampling RV and PA systolic pressures are estimated in the mid 30s.

## 2024-04-23 NOTE — TELEPHONE ENCOUNTER
Called patient of result's and recommendation's, patient reports doing fine at this time denies chest pain, shortness of breath or palpitation's, patient is aware to contact our office for sooner appointment if he becomes symptomatic.

## 2024-11-12 ENCOUNTER — TELEPHONE (OUTPATIENT)
Dept: CARDIOLOGY | Facility: CLINIC | Age: 64
End: 2024-11-12
Payer: MEDICARE

## 2024-11-12 NOTE — TELEPHONE ENCOUNTER
PT CALLED IN NEEDING TO RESCHEDULE A MISSED APPT. I HAVE TRIED TWICE DIFFERENT TIMES TO REACH PATIENT TO TRY TO OFFER AN APPT. THERE IS NOT  SETUP

## 2024-11-15 ENCOUNTER — TELEPHONE (OUTPATIENT)
Dept: CARDIOLOGY | Facility: CLINIC | Age: 64
End: 2024-11-15
Payer: MEDICARE

## 2024-11-15 NOTE — TELEPHONE ENCOUNTER
I tried to call pt again it goes straight to patient has a voicemailbox that has not been setup yet. This is my third attempt to try to call pt to offer an appt.

## 2025-02-11 ENCOUNTER — OFFICE VISIT (OUTPATIENT)
Dept: CARDIOLOGY | Facility: CLINIC | Age: 65
End: 2025-02-11
Payer: MEDICARE

## 2025-02-11 VITALS
HEART RATE: 58 BPM | DIASTOLIC BLOOD PRESSURE: 83 MMHG | SYSTOLIC BLOOD PRESSURE: 147 MMHG | HEIGHT: 67 IN | OXYGEN SATURATION: 96 % | WEIGHT: 236 LBS | BODY MASS INDEX: 37.04 KG/M2

## 2025-02-11 DIAGNOSIS — I25.119 CORONARY ARTERY DISEASE INVOLVING NATIVE CORONARY ARTERY OF NATIVE HEART WITH ANGINA PECTORIS: ICD-10-CM

## 2025-02-11 DIAGNOSIS — I10 PRIMARY HYPERTENSION: ICD-10-CM

## 2025-02-11 DIAGNOSIS — R07.2 PRECORDIAL PAIN: Primary | ICD-10-CM

## 2025-02-11 DIAGNOSIS — R06.09 DYSPNEA ON EXERTION: ICD-10-CM

## 2025-02-11 PROCEDURE — 3077F SYST BP >= 140 MM HG: CPT | Performed by: PHYSICIAN ASSISTANT

## 2025-02-11 PROCEDURE — 99214 OFFICE O/P EST MOD 30 MIN: CPT | Performed by: PHYSICIAN ASSISTANT

## 2025-02-11 PROCEDURE — 3079F DIAST BP 80-89 MM HG: CPT | Performed by: PHYSICIAN ASSISTANT

## 2025-02-11 PROCEDURE — 1160F RVW MEDS BY RX/DR IN RCRD: CPT | Performed by: PHYSICIAN ASSISTANT

## 2025-02-11 PROCEDURE — 1159F MED LIST DOCD IN RCRD: CPT | Performed by: PHYSICIAN ASSISTANT

## 2025-02-11 PROCEDURE — 93000 ELECTROCARDIOGRAM COMPLETE: CPT | Performed by: PHYSICIAN ASSISTANT

## 2025-02-11 RX ORDER — QUETIAPINE FUMARATE 200 MG/1
100 TABLET, FILM COATED ORAL DAILY
COMMUNITY
Start: 2024-11-23

## 2025-02-11 RX ORDER — AMLODIPINE BESYLATE 5 MG/1
5 TABLET ORAL DAILY
Qty: 30 TABLET | Refills: 11 | Status: SHIPPED | OUTPATIENT
Start: 2025-02-11

## 2025-02-11 NOTE — H&P (VIEW-ONLY)
Problem list     Subjective   Josh Horan is a 64 y.o. male     Chief Complaint   Patient presents with   • Chest Pain     Reports feeling like he did prior to open heart surgery   • Shortness of Breath   • Palpitations   • Hypertension   Problem list:  1.  Coronary artery disease.  1.1.  Coronary artery bypass graft performed, 7/12/2022.  The patient was grafted with LIMA to LAD, vein graft to the right, vein graft to the diagonal, and vein graft sequenced between OM1 and OM 2.  The patient had concurrent ligation of the left atrial appendage with size 35 atrial clip.  2.  Postop A. fib.  3.  Hypertension  4.  Dyslipidemia  5.  Hypothyroidism.  6.  Bipolar disorder.    HPI  The patient presents in clinic today for review of clinical scenario.  Just last year, he was scheduled for stress and echo studies because of various symptoms.  Stress test indicated inferoseptal and diaphragmatic ischemia.  Post stress EF was at 64%.  Echo was performed as well which indicated an EF of 45 to 50% with no significant valvular or structural abnormalities.  As the patient was clinically stable, we opted not to pursue catheterization and overall treat medically at that time.  He had done well up until recently.  He now notes chest tightness.  He has a degree of left upper extremity discomfort as well.  He is concerned because symptoms are very similar to what he had prior to CABG.  Baseline dyspnea has intensified.  He has no failure nor dysrhythmic symptoms.  The patient has no further complaints.    Current Outpatient Medications on File Prior to Visit   Medication Sig Dispense Refill   • aspirin  MG tablet Take 1 tablet by mouth Daily. 90 tablet 3   • atorvastatin (LIPITOR) 40 MG tablet Take 1 tablet by mouth Every Night. 90 tablet 3   • busPIRone (BUSPAR) 10 MG tablet Take 1 tablet by mouth 3 (Three) Times a Day.     • DULoxetine (CYMBALTA) 60 MG capsule Take 1 capsule by mouth Daily.     • hydrOXYzine (ATARAX) 25 MG  tablet Take 1 tablet by mouth Daily. 30 tablet 2   • ibuprofen (ADVIL,MOTRIN) 800 MG tablet Take 1 tablet by mouth Every 6 (Six) Hours As Needed for Mild Pain.     • levothyroxine (SYNTHROID, LEVOTHROID) 100 MCG tablet Take 1 tablet by mouth Daily.     • losartan (COZAAR) 100 MG tablet Take 0.5 tablets by mouth Daily. 90 tablet 3   • metoprolol tartrate (LOPRESSOR) 50 MG tablet Take 0.5 tablets by mouth 2 (Two) Times a Day.     • nitroglycerin (NITROSTAT) 0.4 MG SL tablet 1 under the tongue as needed for angina, may repeat q5mins for up three doses 25 tablet 3   • omeprazole (priLOSEC) 40 MG capsule Take 1 capsule by mouth Daily.     • potassium chloride 10 MEQ CR tablet Take 1 tablet by mouth once daily 30 tablet 0   • QUEtiapine (SEROquel) 200 MG tablet Take 0.5 tablets by mouth Daily.     • vitamin D (ERGOCALCIFEROL) 1.25 MG (02337 UT) capsule capsule Take 1 capsule by mouth 1 (One) Time Per Week.     • pharmacy consult - MTM Daily. (Patient not taking: Reported on 2/11/2025)       No current facility-administered medications on file prior to visit.       Adhesive tape    Past Medical History:   Diagnosis Date   • Acid reflux    • Anxiety    • Arthritis    • Back pain    • Bipolar 1 disorder    • Coronary artery disease    • Depression    • Headache    • Hiatal hernia    • Hyperlipidemia    • Hypertension    • Hyperthyroidism    • Hypothyroidism    • Lyme disease    • Migraine    • MRSA infection     1.5-2 years ago- back   • Panic attacks    • PTSD (post-traumatic stress disorder)    • Sciatic nerve pain    • Seasonal allergies    • Sleep apnea     doesnt use machine   • Tinnitus    • Wears glasses        Social History     Socioeconomic History   • Marital status: Single   • Number of children: 2   Tobacco Use   • Smoking status: Never   • Smokeless tobacco: Never   Vaping Use   • Vaping status: Never Used   Substance and Sexual Activity   • Alcohol use: Never   • Drug use: Yes     Frequency: 7.0 times per week  "    Types: Marijuana   • Sexual activity: Defer       Family History   Problem Relation Age of Onset   • Hypertension Mother    • Heart disease Mother    • Cancer Mother    • Heart disease Father    • Heart attack Father        Review of Systems   Constitutional:  Positive for fatigue. Negative for chills, diaphoresis and fever.   HENT:  Negative for hearing loss.    Eyes: Negative.  Negative for visual disturbance.   Respiratory:  Positive for apnea, cough, chest tightness, shortness of breath and wheezing.    Cardiovascular:  Positive for chest pain and palpitations. Negative for leg swelling.   Gastrointestinal: Negative.  Negative for abdominal pain and blood in stool.   Endocrine: Negative.    Genitourinary: Negative.  Negative for hematuria.   Musculoskeletal:  Positive for back pain, neck pain and neck stiffness. Negative for arthralgias and myalgias.   Skin: Negative.  Negative for rash and wound.   Allergic/Immunologic: Negative.  Negative for environmental allergies and food allergies.   Neurological:  Positive for dizziness, light-headedness and headaches. Negative for syncope, weakness and numbness.   Hematological:  Bruises/bleeds easily (bleeds easily).   Psychiatric/Behavioral:  Positive for sleep disturbance (sleep apnea).        Objective   Vitals:    02/11/25 1357   BP: 147/83   Pulse: 58   SpO2: 96%   Weight: 107 kg (236 lb)   Height: 170.2 cm (67\")      /83   Pulse 58   Ht 170.2 cm (67\")   Wt 107 kg (236 lb)   SpO2 96%   BMI 36.96 kg/m²    Lab Results (most recent)       None          Physical Exam  Vitals and nursing note reviewed.   Constitutional:       General: He is not in acute distress.     Appearance: He is well-developed.   HENT:      Head: Normocephalic and atraumatic.   Eyes:      Conjunctiva/sclera: Conjunctivae normal.      Pupils: Pupils are equal, round, and reactive to light.   Neck:      Vascular: No JVD.      Trachea: No tracheal deviation.   Cardiovascular:      Rate " and Rhythm: Normal rate and regular rhythm.      Heart sounds: Normal heart sounds.   Pulmonary:      Effort: Pulmonary effort is normal.      Breath sounds: Normal breath sounds.   Abdominal:      General: Bowel sounds are normal. There is no distension.      Palpations: Abdomen is soft. There is no mass.      Tenderness: There is no abdominal tenderness. There is no guarding or rebound.   Musculoskeletal:         General: No tenderness or deformity. Normal range of motion.      Cervical back: Normal range of motion and neck supple.   Skin:     General: Skin is warm and dry.      Coloration: Skin is not pale.      Findings: No erythema or rash.   Neurological:      Mental Status: He is alert and oriented to person, place, and time.   Psychiatric:         Behavior: Behavior normal.         Thought Content: Thought content normal.         Judgment: Judgment normal.         Procedure     ECG 12 Lead    Date/Time: 2/11/2025 2:00 PM  Performed by: Josh Kennedy PA    Authorized by: Josh Kennedy PA  Comparison: compared with previous ECG from 2/15/2024  Comparison to previous ECG: Sinus rhythm, rate 61, right axis deviation, minor nonspecific ST and T wave changes, no acute changes noted.           Assessment & Plan      Diagnosis Plan   1. Precordial pain  Case Request Cath Lab: Left Heart Cath      2. Dyspnea on exertion  Case Request Cath Lab: Left Heart Cath      3. Coronary artery disease involving native coronary artery of native heart with angina pectoris  Case Request Cath Lab: Left Heart Cath      4. Primary hypertension  Case Request Cath Lab: Left Heart Cath        1.  The patient presents in the clinic today for follow-up.  Again, stress test just last year suggested inferoseptal and diaphragmatic ischemia.  As he was doing well at the time, we opted to treat medically and follow clinically.  Recently, he has started noticing increasing symptoms of chest tightness and symptoms otherwise concerning  for angina.  He tells me that symptoms are very similar to what he had prior to bypass.  He is very concerned and would like to pursue further evaluation now.    2.  In this setting, he will be scheduled for catheterization to reevaluate native coronary and graft anatomy.  We can recommend further as we know results of catheterization.    3.  In the interim, we will institute further antianginal therapy given intensification of symptoms.  We are limited with intensifying beta-blocker therapy because of intermittent bradycardia.  I would start amlodipine 5 mg daily which will serve for antianginal prophylaxis and for further blood pressure control.  He will continue medical therapy otherwise.    4.  To expedite catheterization, he will be scheduled at [facility] in [place].  He will call for any issues prior to follow-up.  Further pending cath findings.                            Electronically signed by:

## 2025-02-11 NOTE — PROGRESS NOTES
Problem list     Subjective   Josh Horan is a 64 y.o. male     Chief Complaint   Patient presents with    Chest Pain     Reports feeling like he did prior to open heart surgery    Shortness of Breath    Palpitations    Hypertension   Problem list:  1.  Coronary artery disease.  1.1.  Coronary artery bypass graft performed, 7/12/2022.  The patient was grafted with LIMA to LAD, vein graft to the right, vein graft to the diagonal, and vein graft sequenced between OM1 and OM 2.  The patient had concurrent ligation of the left atrial appendage with size 35 atrial clip.  2.  Postop A. fib.  3.  Hypertension  4.  Dyslipidemia  5.  Hypothyroidism.  6.  Bipolar disorder.    HPI  The patient presents in clinic today for review of clinical scenario.  Just last year, he was scheduled for stress and echo studies because of various symptoms.  Stress test indicated inferoseptal and diaphragmatic ischemia.  Post stress EF was at 64%.  Echo was performed as well which indicated an EF of 45 to 50% with no significant valvular or structural abnormalities.  As the patient was clinically stable, we opted not to pursue catheterization and overall treat medically at that time.  He had done well up until recently.  He now notes chest tightness.  He has a degree of left upper extremity discomfort as well.  He is concerned because symptoms are very similar to what he had prior to CABG.  Baseline dyspnea has intensified.  He has no failure nor dysrhythmic symptoms.  The patient has no further complaints.    Current Outpatient Medications on File Prior to Visit   Medication Sig Dispense Refill    aspirin  MG tablet Take 1 tablet by mouth Daily. 90 tablet 3    atorvastatin (LIPITOR) 40 MG tablet Take 1 tablet by mouth Every Night. 90 tablet 3    busPIRone (BUSPAR) 10 MG tablet Take 1 tablet by mouth 3 (Three) Times a Day.      DULoxetine (CYMBALTA) 60 MG capsule Take 1 capsule by mouth Daily.      hydrOXYzine (ATARAX) 25 MG tablet Take  1 tablet by mouth Daily. 30 tablet 2    ibuprofen (ADVIL,MOTRIN) 800 MG tablet Take 1 tablet by mouth Every 6 (Six) Hours As Needed for Mild Pain.      levothyroxine (SYNTHROID, LEVOTHROID) 100 MCG tablet Take 1 tablet by mouth Daily.      losartan (COZAAR) 100 MG tablet Take 0.5 tablets by mouth Daily. 90 tablet 3    metoprolol tartrate (LOPRESSOR) 50 MG tablet Take 0.5 tablets by mouth 2 (Two) Times a Day.      nitroglycerin (NITROSTAT) 0.4 MG SL tablet 1 under the tongue as needed for angina, may repeat q5mins for up three doses 25 tablet 3    omeprazole (priLOSEC) 40 MG capsule Take 1 capsule by mouth Daily.      potassium chloride 10 MEQ CR tablet Take 1 tablet by mouth once daily 30 tablet 0    QUEtiapine (SEROquel) 200 MG tablet Take 0.5 tablets by mouth Daily.      vitamin D (ERGOCALCIFEROL) 1.25 MG (25658 UT) capsule capsule Take 1 capsule by mouth 1 (One) Time Per Week.      pharmacy consult - MTM Daily. (Patient not taking: Reported on 2/11/2025)       No current facility-administered medications on file prior to visit.       Adhesive tape    Past Medical History:   Diagnosis Date    Acid reflux     Anxiety     Arthritis     Back pain     Bipolar 1 disorder     Coronary artery disease     Depression     Headache     Hiatal hernia     Hyperlipidemia     Hypertension     Hyperthyroidism     Hypothyroidism     Lyme disease     Migraine     MRSA infection     1.5-2 years ago- back    Panic attacks     PTSD (post-traumatic stress disorder)     Sciatic nerve pain     Seasonal allergies     Sleep apnea     doesnt use machine    Tinnitus     Wears glasses        Social History     Socioeconomic History    Marital status: Single    Number of children: 2   Tobacco Use    Smoking status: Never    Smokeless tobacco: Never   Vaping Use    Vaping status: Never Used   Substance and Sexual Activity    Alcohol use: Never    Drug use: Yes     Frequency: 7.0 times per week     Types: Marijuana    Sexual activity: Defer  "      Family History   Problem Relation Age of Onset    Hypertension Mother     Heart disease Mother     Cancer Mother     Heart disease Father     Heart attack Father        Review of Systems   Constitutional:  Positive for fatigue. Negative for chills, diaphoresis and fever.   HENT:  Negative for hearing loss.    Eyes: Negative.  Negative for visual disturbance.   Respiratory:  Positive for apnea, cough, chest tightness, shortness of breath and wheezing.    Cardiovascular:  Positive for chest pain and palpitations. Negative for leg swelling.   Gastrointestinal: Negative.  Negative for abdominal pain and blood in stool.   Endocrine: Negative.    Genitourinary: Negative.  Negative for hematuria.   Musculoskeletal:  Positive for back pain, neck pain and neck stiffness. Negative for arthralgias and myalgias.   Skin: Negative.  Negative for rash and wound.   Allergic/Immunologic: Negative.  Negative for environmental allergies and food allergies.   Neurological:  Positive for dizziness, light-headedness and headaches. Negative for syncope, weakness and numbness.   Hematological:  Bruises/bleeds easily (bleeds easily).   Psychiatric/Behavioral:  Positive for sleep disturbance (sleep apnea).        Objective   Vitals:    02/11/25 1357   BP: 147/83   Pulse: 58   SpO2: 96%   Weight: 107 kg (236 lb)   Height: 170.2 cm (67\")      /83   Pulse 58   Ht 170.2 cm (67\")   Wt 107 kg (236 lb)   SpO2 96%   BMI 36.96 kg/m²    Lab Results (most recent)       None          Physical Exam  Vitals and nursing note reviewed.   Constitutional:       General: He is not in acute distress.     Appearance: He is well-developed.   HENT:      Head: Normocephalic and atraumatic.   Eyes:      Conjunctiva/sclera: Conjunctivae normal.      Pupils: Pupils are equal, round, and reactive to light.   Neck:      Vascular: No JVD.      Trachea: No tracheal deviation.   Cardiovascular:      Rate and Rhythm: Normal rate and regular rhythm.      " Heart sounds: Normal heart sounds.   Pulmonary:      Effort: Pulmonary effort is normal.      Breath sounds: Normal breath sounds.   Abdominal:      General: Bowel sounds are normal. There is no distension.      Palpations: Abdomen is soft. There is no mass.      Tenderness: There is no abdominal tenderness. There is no guarding or rebound.   Musculoskeletal:         General: No tenderness or deformity. Normal range of motion.      Cervical back: Normal range of motion and neck supple.   Skin:     General: Skin is warm and dry.      Coloration: Skin is not pale.      Findings: No erythema or rash.   Neurological:      Mental Status: He is alert and oriented to person, place, and time.   Psychiatric:         Behavior: Behavior normal.         Thought Content: Thought content normal.         Judgment: Judgment normal.           Procedure     ECG 12 Lead    Date/Time: 2/11/2025 2:00 PM  Performed by: Josh Kennedy PA    Authorized by: Josh Kennedy PA  Comparison: compared with previous ECG from 2/15/2024             Assessment & Plan      Diagnosis Plan   1. Precordial pain  Case Request Cath Lab: Left Heart Cath      2. Dyspnea on exertion  Case Request Cath Lab: Left Heart Cath      3. Coronary artery disease involving native coronary artery of native heart with angina pectoris  Case Request Cath Lab: Left Heart Cath      4. Primary hypertension  Case Request Cath Lab: Left Heart Cath        1.  The patient presents in the clinic today for follow-up.  Again, stress test just last year suggested inferoseptal and diaphragmatic ischemia.  As he was doing well at the time, we opted to treat medically and follow clinically.  Recently, he has started noticing increasing symptoms of chest tightness and symptoms otherwise concerning for angina.  He tells me that symptoms are very similar to what he had prior to bypass.  He is very concerned and would like to pursue further evaluation now.    2.  In this setting, he  will be scheduled for catheterization to reevaluate native coronary and graft anatomy.  We can recommend further as we know results of catheterization.    3.  In the interim, we will institute further antianginal therapy given intensification of symptoms.  We are limited with intensifying beta-blocker therapy because of intermittent bradycardia.  I would start amlodipine 5 mg daily which will serve for antianginal prophylaxis and for further blood pressure control.  He will continue medical therapy otherwise.    4.  To expedite catheterization, he will be scheduled at [facility] in [place].  He will call for any issues prior to follow-up.  Further pending cath findings.                            Electronically signed by:

## 2025-02-25 ENCOUNTER — HOSPITAL ENCOUNTER (OUTPATIENT)
Facility: HOSPITAL | Age: 65
Setting detail: HOSPITAL OUTPATIENT SURGERY
Discharge: HOME | End: 2025-02-25
Attending: INTERNAL MEDICINE | Admitting: INTERNAL MEDICINE
Payer: MEDICARE

## 2025-02-25 ENCOUNTER — APPOINTMENT (OUTPATIENT)
Dept: GENERAL RADIOLOGY | Facility: HOSPITAL | Age: 65
End: 2025-02-25
Payer: MEDICARE

## 2025-02-25 VITALS
TEMPERATURE: 97.8 F | BODY MASS INDEX: 36.1 KG/M2 | SYSTOLIC BLOOD PRESSURE: 114 MMHG | DIASTOLIC BLOOD PRESSURE: 77 MMHG | HEART RATE: 67 BPM | WEIGHT: 230 LBS | HEIGHT: 67 IN | RESPIRATION RATE: 15 BRPM | OXYGEN SATURATION: 93 %

## 2025-02-25 DIAGNOSIS — I25.119 CORONARY ARTERY DISEASE INVOLVING NATIVE CORONARY ARTERY OF NATIVE HEART WITH ANGINA PECTORIS: ICD-10-CM

## 2025-02-25 DIAGNOSIS — R07.2 PRECORDIAL PAIN: ICD-10-CM

## 2025-02-25 DIAGNOSIS — I10 PRIMARY HYPERTENSION: ICD-10-CM

## 2025-02-25 DIAGNOSIS — R06.09 DYSPNEA ON EXERTION: ICD-10-CM

## 2025-02-25 LAB
ALBUMIN SERPL-MCNC: 4.2 G/DL (ref 3.5–5.2)
ALBUMIN/GLOB SERPL: 1.6 G/DL
ALP SERPL-CCNC: 122 U/L (ref 39–117)
ALT SERPL W P-5'-P-CCNC: 39 U/L (ref 1–41)
ANION GAP SERPL CALCULATED.3IONS-SCNC: 15 MMOL/L (ref 5–15)
AST SERPL-CCNC: 24 U/L (ref 1–40)
BILIRUB SERPL-MCNC: 0.4 MG/DL (ref 0–1.2)
BUN SERPL-MCNC: 14 MG/DL (ref 8–23)
BUN/CREAT SERPL: 13.9 (ref 7–25)
CALCIUM SPEC-SCNC: 9.2 MG/DL (ref 8.6–10.5)
CHLORIDE SERPL-SCNC: 101 MMOL/L (ref 98–107)
CHOLEST SERPL-MCNC: 145 MG/DL (ref 0–200)
CO2 SERPL-SCNC: 22 MMOL/L (ref 22–29)
CREAT SERPL-MCNC: 1.01 MG/DL (ref 0.76–1.27)
DEPRECATED RDW RBC AUTO: 40.1 FL (ref 37–54)
EGFRCR SERPLBLD CKD-EPI 2021: 84.1 ML/MIN/1.73
ERYTHROCYTE [DISTWIDTH] IN BLOOD BY AUTOMATED COUNT: 12.7 % (ref 12.3–15.4)
GLOBULIN UR ELPH-MCNC: 2.7 GM/DL
GLUCOSE SERPL-MCNC: 106 MG/DL (ref 65–99)
HBA1C MFR BLD: 6.4 % (ref 4.8–5.6)
HCT VFR BLD AUTO: 42.2 % (ref 37.5–51)
HDLC SERPL-MCNC: 29 MG/DL (ref 40–60)
HGB BLD-MCNC: 14.5 G/DL (ref 13–17.7)
LDLC SERPL CALC-MCNC: 79 MG/DL (ref 0–100)
LDLC/HDLC SERPL: 2.46 {RATIO}
LV EF ANGIOGRAM EST: 65 %
MCH RBC QN AUTO: 29.9 PG (ref 26.6–33)
MCHC RBC AUTO-ENTMCNC: 34.4 G/DL (ref 31.5–35.7)
MCV RBC AUTO: 87 FL (ref 79–97)
PLATELET # BLD AUTO: 213 10*3/MM3 (ref 140–450)
PMV BLD AUTO: 10.4 FL (ref 6–12)
POTASSIUM SERPL-SCNC: 4.2 MMOL/L (ref 3.5–5.2)
PROT SERPL-MCNC: 6.9 G/DL (ref 6–8.5)
RBC # BLD AUTO: 4.85 10*6/MM3 (ref 4.14–5.8)
SODIUM SERPL-SCNC: 138 MMOL/L (ref 136–145)
TRIGL SERPL-MCNC: 223 MG/DL (ref 0–150)
VLDLC SERPL-MCNC: 37 MG/DL (ref 5–40)
WBC NRBC COR # BLD AUTO: 12.74 10*3/MM3 (ref 3.4–10.8)

## 2025-02-25 PROCEDURE — 36415 COLL VENOUS BLD VENIPUNCTURE: CPT

## 2025-02-25 PROCEDURE — 25010000002 NICARDIPINE 2.5 MG/ML SOLUTION: Performed by: INTERNAL MEDICINE

## 2025-02-25 PROCEDURE — 80061 LIPID PANEL: CPT | Performed by: NURSE PRACTITIONER

## 2025-02-25 PROCEDURE — 25010000002 MIDAZOLAM PER 1 MG: Performed by: INTERNAL MEDICINE

## 2025-02-25 PROCEDURE — C1769 GUIDE WIRE: HCPCS | Performed by: INTERNAL MEDICINE

## 2025-02-25 PROCEDURE — C1894 INTRO/SHEATH, NON-LASER: HCPCS | Performed by: INTERNAL MEDICINE

## 2025-02-25 PROCEDURE — 25010000002 HEPARIN (PORCINE) PER 1000 UNITS: Performed by: INTERNAL MEDICINE

## 2025-02-25 PROCEDURE — 83036 HEMOGLOBIN GLYCOSYLATED A1C: CPT | Performed by: NURSE PRACTITIONER

## 2025-02-25 PROCEDURE — 80053 COMPREHEN METABOLIC PANEL: CPT | Performed by: NURSE PRACTITIONER

## 2025-02-25 PROCEDURE — 71045 X-RAY EXAM CHEST 1 VIEW: CPT

## 2025-02-25 PROCEDURE — 25010000002 FENTANYL CITRATE (PF) 50 MCG/ML SOLUTION: Performed by: INTERNAL MEDICINE

## 2025-02-25 PROCEDURE — 93459 L HRT ART/GRFT ANGIO: CPT | Performed by: INTERNAL MEDICINE

## 2025-02-25 PROCEDURE — 25510000001 IOPAMIDOL PER 1 ML: Performed by: INTERNAL MEDICINE

## 2025-02-25 PROCEDURE — 25810000003 SODIUM CHLORIDE 0.9 % SOLUTION: Performed by: INTERNAL MEDICINE

## 2025-02-25 PROCEDURE — 85027 COMPLETE CBC AUTOMATED: CPT | Performed by: NURSE PRACTITIONER

## 2025-02-25 PROCEDURE — 25010000002 LIDOCAINE PF 1% 1 % SOLUTION: Performed by: INTERNAL MEDICINE

## 2025-02-25 RX ORDER — HEPARIN SODIUM 1000 [USP'U]/ML
INJECTION, SOLUTION INTRAVENOUS; SUBCUTANEOUS
Status: DISCONTINUED | OUTPATIENT
Start: 2025-02-25 | End: 2025-02-25 | Stop reason: HOSPADM

## 2025-02-25 RX ORDER — MIDAZOLAM HYDROCHLORIDE 1 MG/ML
INJECTION, SOLUTION INTRAMUSCULAR; INTRAVENOUS
Status: DISCONTINUED | OUTPATIENT
Start: 2025-02-25 | End: 2025-02-25 | Stop reason: HOSPADM

## 2025-02-25 RX ORDER — ASPIRIN 325 MG
325 TABLET ORAL ONCE
Status: DISCONTINUED | OUTPATIENT
Start: 2025-02-25 | End: 2025-02-25 | Stop reason: HOSPADM

## 2025-02-25 RX ORDER — ASPIRIN 325 MG
325 TABLET, DELAYED RELEASE (ENTERIC COATED) ORAL DAILY
Status: DISCONTINUED | OUTPATIENT
Start: 2025-02-26 | End: 2025-02-25 | Stop reason: HOSPADM

## 2025-02-25 RX ORDER — LIDOCAINE HYDROCHLORIDE 10 MG/ML
INJECTION, SOLUTION EPIDURAL; INFILTRATION; INTRACAUDAL; PERINEURAL
Status: DISCONTINUED | OUTPATIENT
Start: 2025-02-25 | End: 2025-02-25 | Stop reason: HOSPADM

## 2025-02-25 RX ORDER — ACETAMINOPHEN 325 MG/1
650 TABLET ORAL EVERY 4 HOURS PRN
Status: DISCONTINUED | OUTPATIENT
Start: 2025-02-25 | End: 2025-02-25 | Stop reason: HOSPADM

## 2025-02-25 RX ORDER — FENTANYL CITRATE 50 UG/ML
INJECTION, SOLUTION INTRAMUSCULAR; INTRAVENOUS
Status: DISCONTINUED | OUTPATIENT
Start: 2025-02-25 | End: 2025-02-25 | Stop reason: HOSPADM

## 2025-02-25 RX ORDER — IOPAMIDOL 755 MG/ML
INJECTION, SOLUTION INTRAVASCULAR
Status: DISCONTINUED | OUTPATIENT
Start: 2025-02-25 | End: 2025-02-25 | Stop reason: HOSPADM

## 2025-02-25 RX ORDER — SODIUM CHLORIDE 0.9 % (FLUSH) 0.9 %
1-10 SYRINGE (ML) INJECTION AS NEEDED
Status: DISCONTINUED | OUTPATIENT
Start: 2025-02-25 | End: 2025-02-25 | Stop reason: HOSPADM

## 2025-02-25 RX ORDER — ONDANSETRON 2 MG/ML
4 INJECTION INTRAMUSCULAR; INTRAVENOUS EVERY 6 HOURS PRN
Status: DISCONTINUED | OUTPATIENT
Start: 2025-02-25 | End: 2025-02-25 | Stop reason: HOSPADM

## 2025-02-25 RX ORDER — DIAZEPAM 5 MG/1
5 TABLET ORAL EVERY 8 HOURS PRN
Status: DISCONTINUED | OUTPATIENT
Start: 2025-02-25 | End: 2025-02-25 | Stop reason: HOSPADM

## 2025-02-25 RX ORDER — SODIUM CHLORIDE 9 MG/ML
40 INJECTION, SOLUTION INTRAVENOUS AS NEEDED
Status: DISCONTINUED | OUTPATIENT
Start: 2025-02-25 | End: 2025-02-25 | Stop reason: HOSPADM

## 2025-02-25 RX ORDER — SODIUM CHLORIDE 0.9 % (FLUSH) 0.9 %
10 SYRINGE (ML) INJECTION EVERY 12 HOURS SCHEDULED
Status: DISCONTINUED | OUTPATIENT
Start: 2025-02-25 | End: 2025-02-25 | Stop reason: HOSPADM

## 2025-02-25 RX ORDER — NITROGLYCERIN 0.4 MG/1
0.4 TABLET SUBLINGUAL
Status: DISCONTINUED | OUTPATIENT
Start: 2025-02-25 | End: 2025-02-25 | Stop reason: HOSPADM

## 2025-02-25 RX ADMIN — DIAZEPAM 5 MG: 5 TABLET ORAL at 11:15

## 2025-02-25 NOTE — INTERVAL H&P NOTE
H&P reviewed. The patient was examined and there are no changes to the H&P.  Will proceed with cardiac catheterization plus or minus catheter-based intervention today hopefully via left radial access.  Further recommendations to follow procedure.    Adore Lindsay, APRN

## 2025-02-25 NOTE — Clinical Note
Hemostasis started on the left radial artery. R-Band was used in achieving hemostasis. Radial compression device applied to vessel. Hemostasis achieved successfully. Closure device additional comment: 12 CC @ 2661

## 2025-02-25 NOTE — Clinical Note
Patient:   FRANNIE ALONSO            MRN: [facility]-[id number]            FIN: [id number]               Age:   35 years     Sex:  FEMALE     :  [birth year]   Associated Diagnoses:   None   Author:   DADA URENA      CC: leaking of fluid and abdominal pain  PNC: Service ([facility])  LMP: unsure  DARREL: 2019; states she was dated based on LMP that was consistent with her first trimester U/S      HPI: 34yo  at 22+0 with known advanced cervical dilation s/p admission 10/15-10/16 presenting with complaint of increasingly worse abdominal pain since 20:00 and leaking of fluid. Patient states she has had persistent leaking of fluid since she was discharged however today it was more in amount and so much so that it was filling her pad. Patient also reports feeling abdominal pain every few minutes since earlier today, more uncomfortable since 20:00 and now feeling like constant pelvic pressure. Patient denies VB.      PNI:  1. Chronic Hypertension  - Per patient, has had issue with cHTN; previously on HCTZ but stopped during this pregnancy  - Saw MFM doctors at [facility] and was send back to [facility] clinic  2. Previable Advanced Cervical Dilation  - Diagnosed 10/15 upon presentation to [facility] for vaginal spotting--> found to be 4-5cm visually dilated with prolapsing BOW  - Pt desired expectant management, discharged home 10/16 with plan to return at viability    PNL: not available    ARC US: not available    OBHx:    39w M 6#9oz  w/ Breech at [facility]   41w M 8#9oz   GynHx: Denies history of STIs; states she had a pap at beginning of pregnancy that was \"mildly abnormal\" but was told to follow up after pregnancy    PMH: HTN  Medications: PNV, Famotidine  Allergies:  Latex (hives/rash)  PSHx: Cholecystectomy (), Caesarean Section ()  SHx: Denies history of illicit drug use,   FHx: Mom, sister with HTN; sister with DVT      O:     BP: 121/70  HR: 88  Temp 37.0    General: patient appears uncomfortable   FHT:  The aortic root was injected. Rate = 13 mL/sec. Total volume = 30 mL. At 600 PSI. unable to be obtained by doppler secondary to body habitus  Dendron: limited secondayr to patient body habitus  SSE: external genitalia grossly wet with yellow-green appearing fluid, no BOW visualized, umbilical cord and fetal hands visualized upon entry of speculum, yellow-green appearing fluid in vaginal vault, +nitrazine  SVE: 5/100/-2 with fetal parts palpated  BSUS: no fetus visualized, likely secondary to  labor and fetus in vagina, exam limited by body habitus    A/P: 36yo  at 22+0 with known advanced cervical dilation now with previable PPROM and PTL  1. FHT: unable to be performed secondary to low position of fetus in vagina  -patient aware that she has a previable pregnancy and no fetal interventions can be performed for resuscitation  2. Previable PPROM/PTL  -patient admitted to [facility]'s room for expectant management  -patient desires epidural for pain control, anesthesia notified  -patient desires to hold the baby after delivery, declines  services  -O+ on previous type and screen 10/15, no indication for Rhogam  -no gross evidence of chorioamnionitis at this time, patient afebrile, staining of fluids could be secondary to meconium. however patient remains at risk for infection so will continue to closely monitor  3. Chronic HTN  -patient normotensive currently  -will continue to closely moniotor BPs  -([facility])- baseline pree labs  WNL per [facility] report, EKG 3/2018 WNL, PCR 60 (18)  -CMP/CBC wnl 10/15,   4. PNI  -Obesity BMI 42  5. Routine Orders  -Fentanyl 50mcg IV for pain now--anesthesia consulted for epidural  -keep NPO for now--IVF running      Patient was explained the risks and benefits of attempting a vaginal birth after  section. The benefit of quicker physical recovery were explained. Patient was informed that she has a roughly 50-70% chance of success at . The <1% risk of uterine rupture was explained (2% if Pitocin is used) and if that were to occur, an  emerency  section would be necessary and hysterectomy and maternal hemorrhage would be possible. All questions and concerns were addressed. Patient consented to a .     d/w Dr. Lamar MD PGY4 and Dr. Mina (in house attending)    Cassie Alvarez DO PGY3            Electronically Signed On 10/19/2018 01:25  __________________________________________________   CASSIE URENA              Attending note  Agree with resident note. Patient is a 36 yo  at 22+0 presents in active  labor. Will admit for expectant management. Slightly elevated WBC count, will conitnue to follow for si/sx of infection.  BILL Mina MD           Electronically Signed On 10/19/2018 07:56  __________________________________________________   SHIV SMITH

## 2025-03-13 ENCOUNTER — OFFICE VISIT (OUTPATIENT)
Dept: CARDIOLOGY | Facility: CLINIC | Age: 65
End: 2025-03-13
Payer: MEDICARE

## 2025-03-13 VITALS
OXYGEN SATURATION: 95 % | DIASTOLIC BLOOD PRESSURE: 71 MMHG | HEIGHT: 67 IN | SYSTOLIC BLOOD PRESSURE: 122 MMHG | WEIGHT: 233 LBS | HEART RATE: 59 BPM | BODY MASS INDEX: 36.57 KG/M2

## 2025-03-13 DIAGNOSIS — R06.09 DYSPNEA ON EXERTION: ICD-10-CM

## 2025-03-13 DIAGNOSIS — I10 PRIMARY HYPERTENSION: ICD-10-CM

## 2025-03-13 DIAGNOSIS — I25.119 CORONARY ARTERY DISEASE INVOLVING NATIVE CORONARY ARTERY OF NATIVE HEART WITH ANGINA PECTORIS: Primary | ICD-10-CM

## 2025-03-13 PROCEDURE — 1160F RVW MEDS BY RX/DR IN RCRD: CPT | Performed by: PHYSICIAN ASSISTANT

## 2025-03-13 PROCEDURE — 3074F SYST BP LT 130 MM HG: CPT | Performed by: PHYSICIAN ASSISTANT

## 2025-03-13 PROCEDURE — 99213 OFFICE O/P EST LOW 20 MIN: CPT | Performed by: PHYSICIAN ASSISTANT

## 2025-03-13 PROCEDURE — 1159F MED LIST DOCD IN RCRD: CPT | Performed by: PHYSICIAN ASSISTANT

## 2025-03-13 PROCEDURE — 3078F DIAST BP <80 MM HG: CPT | Performed by: PHYSICIAN ASSISTANT

## 2025-03-13 NOTE — PROGRESS NOTES
Problem list     Subjective   Josh Horan is a 64 y.o. male     Chief Complaint   Patient presents with    Follow-up     Heart cath follow up     Palpitations    Shortness of Breath   Problem list:  1.  Coronary artery disease.  1.1.  Coronary artery bypass graft performed, 7/12/2022.  The patient was grafted with LIMA to LAD, vein graft to the right, vein graft to the diagonal, and vein graft sequenced between OM1 and OM 2.  The patient had concurrent ligation of the left atrial appendage with size 35 atrial clip.  1.2.  Repeat left heart catheterization, 2/2025, given symptoms and abnormal stress test findings.  Catheterization suggested patent grafts with diffuse native vessel disease but no targets for intervention.  Medical management was recommended.  There was note made that that the diagonal was not grafted.  2.  Postop A. fib.  3.  Hypertension  4.  Dyslipidemia  5.  Hypothyroidism.  6.  Bipolar disorder.    HPI  The patient presents in the clinic today for follow-up.  As above, he was scheduled for catheterization last month because of clinical scenario.  Cath supported mostly patent grafts with diffuse native vessel disease, but no targets for intervention.  Medical management was recommended.  We did start him on calcium channel blocker therapy for further blood pressure control and antianginal prophylaxis as well prior to cath.  He may have benefited some from this.  He reports still some degree of angina, mostly stable now by description.  Dyspnea has remained unchanged.  He has ongoing weakness and fatigue which he feels could be deconditioning.  Cath site is stable.  He has no further complaints.    Current Outpatient Medications on File Prior to Visit   Medication Sig Dispense Refill    amLODIPine (NORVASC) 5 MG tablet Take 1 tablet by mouth Daily. 30 tablet 11    aspirin  MG tablet Take 1 tablet by mouth Daily. 90 tablet 3    atorvastatin (LIPITOR) 40 MG tablet Take 1 tablet by mouth Every  Night. 90 tablet 3    busPIRone (BUSPAR) 10 MG tablet Take 1 tablet by mouth 3 (Three) Times a Day.      DULoxetine (CYMBALTA) 60 MG capsule Take 1 capsule by mouth Daily.      Ergocalciferol (VITAMIN D2 PO) Take 1.25 mg by mouth 1 (One) Time Per Week.      hydrOXYzine (ATARAX) 25 MG tablet Take 1 tablet by mouth Daily. 30 tablet 2    ibuprofen (ADVIL,MOTRIN) 800 MG tablet Take 1 tablet by mouth Every 6 (Six) Hours As Needed for Mild Pain.      levothyroxine (SYNTHROID, LEVOTHROID) 100 MCG tablet Take 1 tablet by mouth Daily.      losartan (COZAAR) 100 MG tablet Take 0.5 tablets by mouth Daily. 90 tablet 3    metoprolol tartrate (LOPRESSOR) 50 MG tablet Take 0.5 tablets by mouth 2 (Two) Times a Day.      omeprazole (priLOSEC) 40 MG capsule Take 1 capsule by mouth Daily.      potassium chloride 10 MEQ CR tablet Take 1 tablet by mouth once daily 30 tablet 0    QUEtiapine (SEROquel) 200 MG tablet Take 0.5 tablets by mouth Daily.       No current facility-administered medications on file prior to visit.       Adhesive tape    Past Medical History:   Diagnosis Date    Acid reflux     Anxiety     Arthritis     Back pain     Bipolar 1 disorder     Coronary artery disease     Depression     Headache     Hiatal hernia     Hyperlipidemia     Hypertension     Hyperthyroidism     Hypothyroidism     Lyme disease     Migraine     MRSA infection     1.5-2 years ago- back    Panic attacks     PTSD (post-traumatic stress disorder)     Sciatic nerve pain     Seasonal allergies     Sleep apnea     doesnt use machine    Tinnitus     Viral warts due to HPV     Wears glasses        Social History     Socioeconomic History    Marital status: Single    Number of children: 2   Tobacco Use    Smoking status: Never    Smokeless tobacco: Never   Vaping Use    Vaping status: Never Used   Substance and Sexual Activity    Alcohol use: Never    Drug use: Yes     Frequency: 7.0 times per week     Types: Marijuana    Sexual activity: Defer  "      Family History   Problem Relation Age of Onset    Hypertension Mother     Heart disease Mother     Cancer Mother     Heart disease Father     Heart attack Father        Review of Systems   Constitutional:  Negative for chills, diaphoresis, fatigue and fever.   HENT:  Negative for hearing loss.    Eyes: Negative.  Negative for visual disturbance.   Respiratory:  Positive for apnea. Negative for cough, chest tightness, shortness of breath and wheezing.    Cardiovascular:  Positive for palpitations. Negative for chest pain and leg swelling.   Gastrointestinal:  Positive for abdominal pain. Negative for blood in stool.   Endocrine: Negative.    Genitourinary: Negative.  Negative for hematuria. Urgency: aspirin.  Musculoskeletal:  Positive for arthralgias, back pain, myalgias, neck pain and neck stiffness.   Skin: Negative.  Negative for rash and wound.   Allergic/Immunologic: Negative.  Negative for environmental allergies and food allergies.   Neurological:  Positive for dizziness, weakness, light-headedness and headaches. Negative for syncope and numbness.   Hematological:  Bruises/bleeds easily.   Psychiatric/Behavioral:  Positive for sleep disturbance (sleep apnea).        Objective   Vitals:    03/13/25 1105   BP: 122/71   Pulse: 59   SpO2: 95%   Weight: 106 kg (233 lb)   Height: 170.2 cm (67\")      /71   Pulse 59   Ht 170.2 cm (67\")   Wt 106 kg (233 lb)   SpO2 95%   BMI 36.49 kg/m²    Lab Results (most recent)       None          Physical Exam  Vitals and nursing note reviewed.   Constitutional:       General: He is not in acute distress.     Appearance: He is well-developed.   HENT:      Head: Normocephalic and atraumatic.   Eyes:      Conjunctiva/sclera: Conjunctivae normal.      Pupils: Pupils are equal, round, and reactive to light.   Neck:      Vascular: No JVD.      Trachea: No tracheal deviation.   Cardiovascular:      Rate and Rhythm: Normal rate and regular rhythm.      Heart sounds: " Normal heart sounds.   Pulmonary:      Effort: Pulmonary effort is normal.      Breath sounds: Normal breath sounds.   Abdominal:      General: Bowel sounds are normal. There is no distension.      Palpations: Abdomen is soft. There is no mass.      Tenderness: There is no abdominal tenderness. There is no guarding or rebound.   Musculoskeletal:         General: No tenderness or deformity. Normal range of motion.      Cervical back: Normal range of motion and neck supple.   Skin:     General: Skin is warm and dry.      Coloration: Skin is not pale.      Findings: No erythema or rash.   Neurological:      Mental Status: He is alert and oriented to person, place, and time.   Psychiatric:         Behavior: Behavior normal.         Thought Content: Thought content normal.         Judgment: Judgment normal.           Procedure   Procedures       Assessment & Plan      Diagnosis Plan   1. Coronary artery disease involving native coronary artery of native heart with angina pectoris        2. Dyspnea on exertion        3. Primary hypertension          1.  At this time, the patient appears to be doing well.  He reports stable angina.  His dyspnea is at baseline.  He really has no further cardiovascular symptoms or issues.    2.  Again cath was performed last month, with no target for intervention.  Medical management will be employed at this time.  We did add a calcium channel blocker therapy prior to catheterization, and he does feel improved with this.  I would continue medications without changes he is overall improved on current medical regimen.    3.  We will continue to see him on routine 6 to 9-month intervals, sooner for complications.  He will return for any issues.           Josh Horan  reports that he has never smoked. He has never used smokeless tobacco.         Electronically signed by:

## 2025-06-12 ENCOUNTER — OFFICE VISIT (OUTPATIENT)
Dept: ENDOCRINOLOGY | Facility: CLINIC | Age: 65
End: 2025-06-12
Payer: MEDICARE

## 2025-06-12 VITALS
BODY MASS INDEX: 35.94 KG/M2 | WEIGHT: 229 LBS | SYSTOLIC BLOOD PRESSURE: 120 MMHG | HEART RATE: 81 BPM | HEIGHT: 67 IN | DIASTOLIC BLOOD PRESSURE: 73 MMHG | OXYGEN SATURATION: 99 %

## 2025-06-12 DIAGNOSIS — R79.89 ELEVATED CORTISOL LEVEL: Primary | ICD-10-CM

## 2025-06-12 LAB — CORTIS SERPL-MCNC: 7.34 MCG/DL

## 2025-06-12 PROCEDURE — 82024 ASSAY OF ACTH: CPT | Performed by: INTERNAL MEDICINE

## 2025-06-12 PROCEDURE — 82533 TOTAL CORTISOL: CPT | Performed by: INTERNAL MEDICINE

## 2025-06-12 RX ORDER — POTASSIUM CHLORIDE 750 MG/1
TABLET, EXTENDED RELEASE ORAL
COMMUNITY
Start: 2025-05-02

## 2025-06-12 RX ORDER — DOXYCYCLINE 100 MG/1
1 CAPSULE ORAL
COMMUNITY
Start: 2025-05-07

## 2025-06-12 RX ORDER — IVERMECTIN 3 MG/1
3 TABLET ORAL ONCE
COMMUNITY

## 2025-06-12 RX ORDER — TIRZEPATIDE 2.5 MG/.5ML
INJECTION, SOLUTION SUBCUTANEOUS
COMMUNITY
Start: 2025-05-07

## 2025-06-12 NOTE — PROGRESS NOTES
Office Note      Date: 2025  Patient Name: Josh Horan  MRN: [id number]  : 1960    Chief Complaint   Patient presents with    Abnormal Lab       History of Present Illness:   Josh Horan is a 64 y.o. male who presents for Abnormal Lab    He has noted chronic fatigue.  He had labs done 3/2025.  The serum cortisol was 19.9 (ULN 19.4).  A1c was 6.7%.  B12 and folate were normal.  TSH was normal at 0.541.  FTI was normal.  CBC and CMP were okay.  Vit D was low-normal range.  Total testosterone was normal at 526.  He denies any steroid use.  He reports h/o Lyme disease for over 10 years.  He has h/o controlled HTN.  He has h/o 5v CABG in .  He was started on mounjaro recently and has lost some weight.  He denies any stretch marks.  He notes abdominal obesity.    Subjective      Review of Systems:   Review of Systems   Constitutional:  Positive for activity change, chills, diaphoresis and fatigue.   HENT:  Positive for hearing loss, sneezing and tinnitus.    Eyes:  Positive for itching and visual disturbance.   Respiratory:  Positive for shortness of breath.    Cardiovascular:  Positive for palpitations.   Gastrointestinal:  Positive for nausea.   Endocrine: Positive for cold intolerance, heat intolerance, polydipsia, polyphagia and polyuria.   Genitourinary:  Positive for difficulty urinating.   Musculoskeletal:  Positive for arthralgias, back pain and myalgias.   Skin:  Positive for wound.   Allergic/Immunologic: Positive for environmental allergies.   Neurological:  Positive for dizziness, light-headedness and headaches.   Hematological: Negative.    Psychiatric/Behavioral:  Positive for confusion, decreased concentration, dysphoric mood and sleep disturbance.        The following portions of the patient's history were reviewed and updated as appropriate: allergies, current medications, past family history, past medical history, past social history, past surgical history, and problem  "list.    Objective     Visit Vitals  /73   Pulse 81   Ht 170.2 cm (67\")   Wt 104 kg (229 lb)   SpO2 99%   BMI 35.87 kg/m²       Physical Exam:  Physical Exam  Constitutional:       Appearance: He is obese.   HENT:      Head: Normocephalic and atraumatic.   Eyes:      Extraocular Movements: Extraocular movements intact.      Conjunctiva/sclera: Conjunctivae normal.   Neck:      Thyroid: No thyroid mass, thyromegaly or thyroid tenderness.   Cardiovascular:      Rate and Rhythm: Normal rate and regular rhythm.      Pulses: Normal pulses.      Heart sounds: Normal heart sounds.   Pulmonary:      Effort: Pulmonary effort is normal.      Breath sounds: Normal breath sounds.   Abdominal:      General: Bowel sounds are normal.      Palpations: Abdomen is soft.   Musculoskeletal:         General: Normal range of motion.      Cervical back: Normal range of motion and neck supple.   Lymphadenopathy:      Cervical: No cervical adenopathy.   Skin:     General: Skin is warm and dry.   Neurological:      General: No focal deficit present.      Mental Status: He is alert.   Psychiatric:         Mood and Affect: Mood normal.         Behavior: Behavior normal.         Thought Content: Thought content normal.         Judgment: Judgment normal.         Labs:    TSH  No results found for: \"TSHBASE\"     Free T4  Free T4   Date Value Ref Range Status   07/14/2022 1.91 (H) 0.93 - 1.70 ng/dL Final       T3  No results found for: \"A1MEVDP\"      TPO  No results found for: \"THYROIDAB\"    TG AB  No results found for: \"THGAB\"    TG  No results found for: \"THYROGLB\"    CBC w/DIFF  Lab Results   Component Value Date    WBC 12.74 (H) 02/25/2025    RBC 4.85 02/25/2025    HGB 14.5 02/25/2025    HCT 42.2 02/25/2025    MCV 87.0 02/25/2025    MCH 29.9 02/25/2025    MCHC 34.4 02/25/2025    RDW 12.7 02/25/2025    RDWSD 40.1 02/25/2025    MPV 10.4 02/25/2025     02/25/2025    NEUTRORELPCT 61.1 09/15/2022    LYMPHORELPCT 26.9 09/15/2022    " MONORELPCT 7.1 09/15/2022    EOSRELPCT 3.9 09/15/2022    BASORELPCT 0.6 09/15/2022    AUTOIGPER 0.4 09/15/2022    NEUTROABS 7.11 (H) 09/15/2022    LYMPHSABS 3.13 (H) 09/15/2022    MONOSABS 0.83 09/15/2022    EOSABS 0.46 (H) 09/15/2022    BASOSABS 0.07 09/15/2022    AUTOIGNUM 0.05 09/15/2022    NRBC 0.0 09/15/2022           Assessment / Plan      Assessment & Plan:  Diagnoses and all orders for this visit:    1. Elevated cortisol level (Primary)  Assessment & Plan:  He had mildly elevated serum cortisol about 3 months ago.  We discussed the results and further workup.  I'm not sure this is clinically significant but we will check further labs.  Will contact him with results.  Further follow up pending lab results.    Orders:  -     ACTH; Future  -     Cortisol; Future  -     Cortisol, Urine, Free 24Hr - Urine, Clean Catch; Future      Current Outpatient Medications   Medication Instructions    amLODIPine (NORVASC) 5 mg, Oral, Daily    aspirin 325 mg, Oral, Daily    atorvastatin (LIPITOR) 40 mg, Oral, Nightly    busPIRone (BUSPAR) 10 mg, 3 Times Daily    doxycycline (VIBRAMYCIN) 100 MG capsule 1 capsule    DULoxetine (CYMBALTA) 60 mg, Daily    Ergocalciferol (VITAMIN D2 PO) 1.25 mg, Weekly    hydrOXYzine (ATARAX) 25 mg, Oral, Daily    ibuprofen (ADVIL,MOTRIN) 800 mg, Every 6 Hours PRN    ivermectin (STROMECTOL) 3 mg, Once    levothyroxine (SYNTHROID, LEVOTHROID) 100 mcg, Daily    losartan (COZAAR) 50 mg, Oral, Daily    metoprolol tartrate (LOPRESSOR) 25 mg, 2 Times Daily    Mounjaro 2.5 MG/0.5ML solution auto-injector as directed Subcutaneous weekly for 30 days    potassium chloride (KLOR-CON M10) 10 MEQ CR tablet TAKE 1 TABLET BY MOUTH ONCE DAILY WITH FOOD FOR 30 DAYS WITH LASIX 60    potassium chloride 10 MEQ CR tablet Take 1 tablet by mouth once daily    QUEtiapine (SEROQUEL) 100 mg, Daily      Return if symptoms worsen or fail to improve.    Electronically signed by: Carter Perkins MD  06/12/2025

## 2025-06-12 NOTE — ASSESSMENT & PLAN NOTE
He had mildly elevated serum cortisol about 3 months ago.  We discussed the results and further workup.  I'm not sure this is clinically significant but we will check further labs.  Will contact him with results.  Further follow up pending lab results.

## 2025-06-13 ENCOUNTER — RESULTS FOLLOW-UP (OUTPATIENT)
Dept: ENDOCRINOLOGY | Facility: CLINIC | Age: 65
End: 2025-06-13
Payer: MEDICARE

## 2025-06-15 LAB — ACTH PLAS-MCNC: 32.5 PG/ML (ref 7.2–63.3)

## 2025-07-01 DIAGNOSIS — R79.89 ELEVATED CORTISOL LEVEL: ICD-10-CM

## 2025-07-11 ENCOUNTER — EXTERNAL PBMM DATA (OUTPATIENT)
Dept: PHARMACY | Facility: OTHER | Age: 65
End: 2025-07-11
Payer: MEDICARE

## (undated) DEVICE — Device

## (undated) DEVICE — ADULT, W/LG. BACK PAD, RADIOTRANSPARENT ELEMENT AND LEAD WIRE COMPATIBLE W/: Brand: DEFIBRILLATION ELECTRODES

## (undated) DEVICE — SUT PROLN 4/0 RB1 D/A 36IN 8557H

## (undated) DEVICE — LEVEL SENSORS PADS ARE USED TO ATTACH THE LEVEL SENSORS TO A HARD SHELL RESERVOIR. INCLUDES COUPLING GEL.: Brand: TERUMO® ADVANCED PERFUSION SYSTEM 1

## (undated) DEVICE — 12 FOOT DISPOSABLE EXTENSION CABLE WITH SAFE CONNECT / SCREW-DOWN

## (undated) DEVICE — GLV SURG BIOGEL LTX PF 7 1/2

## (undated) DEVICE — SUT PROLN 3/0 SH D/A 36IN 8522H

## (undated) DEVICE — SUT PDS 1 CTX 36IN VIO PDP371T

## (undated) DEVICE — AVANTI + 4F STD W/GW: Brand: AVANTI

## (undated) DEVICE — TOWEL,OR,DSP,ST,BLUE,STD,4/PK,20PK/CS: Brand: MEDLINE

## (undated) DEVICE — TEMP PACING WIRE: Brand: MYO/WIRE

## (undated) DEVICE — CATH DIAG EXPO .056 IM 6F 100CM

## (undated) DEVICE — CANN AORT ROOT DLP VNT 14G 7F

## (undated) DEVICE — CATHETER,URETHRAL,REDRUBBER,STERILE,22FR: Brand: MEDLINE

## (undated) DEVICE — TBG SXN RIGD MINI/SUCKER 9F 4.75IN

## (undated) DEVICE — CATH DIAG EXPO M/ PK 6FR FL4/FR4 PIG 3PK

## (undated) DEVICE — EZ GLIDE AORTIC CANNULA: Brand: EDWARDS LIFESCIENCES EZ GLIDE AORTIC CANNULA

## (undated) DEVICE — ADAPT/Y PERFUS DLP FML/LUER COLR/CODE/CLMP 8.9AND25.4CM

## (undated) DEVICE — PK CATH CARD 10

## (undated) DEVICE — TBG SXN INTRACARD RIDGID FLUT 24F .25X13IN A/

## (undated) DEVICE — 3M™ MEDIPORE™ H SOFT CLOTH SURGICAL TAPE, 2863, 3 IN X 10 YD, 12/CASE: Brand: 3M™ MEDIPORE™

## (undated) DEVICE — Device: Brand: PERFECTCUT ROTATING AORTIC PUNCH

## (undated) DEVICE — PATIENT RETURN ELECTRODE, SINGLE-USE, CONTACT QUALITY MONITORING, ADULT, WITH 9FT CORD, FOR PATIENTS WEIGING OVER 33LBS. (15KG): Brand: MEGADYNE

## (undated) DEVICE — CONNECT Y INTERSEPT W/LL 3/8 X 3/8 X 3/8IN

## (undated) DEVICE — PAD ARMBRD SURG CONVOL 7.5X20X2IN

## (undated) DEVICE — AVID DUAL STAGE VENOUS DRAINAGE CANNULA: Brand: AVID DUAL STAGE VENOUS DRAINAGE CANNULA

## (undated) DEVICE — SUT PROLN 4/0 SH D/A 36IN 8521H

## (undated) DEVICE — CATH DIAG EXPO .056 FL3.5 6F 100CM

## (undated) DEVICE — ELECTRD BLD EZ CLN STD 2.5IN

## (undated) DEVICE — NDL PERC 1PRT THNWALL W/BASEPLT 18G 7CM

## (undated) DEVICE — GLIDESHEATH BASIC HYDROPHILIC COATED INTRODUCER SHEATH: Brand: GLIDESHEATH

## (undated) DEVICE — CVR PROB ULTRASND/TRANSD W/GEL 18X120CM STRL

## (undated) DEVICE — PENCL ROCKRSWCH MEGADYNE W/HOLSTR 10FT SS

## (undated) DEVICE — SUT SILK 0/0 CT2 18IN C027D

## (undated) DEVICE — GW PERIPH GUIDERIGHT STD/EXCHNG/J/TIP SS 0.035IN 5X260CM

## (undated) DEVICE — MODEL BT2000 P/N 700287-012KIT CONTENTS: MANIFOLD WITH SALINE AND CONTRAST PORTS, SALINE TUBING WITH SPIKE AND HAND SYRINGE, TRANSDUCER: Brand: BT2000 AUTOMATED MANIFOLD KIT

## (undated) DEVICE — STERILE PVP: Brand: MEDLINE INDUSTRIES, INC.

## (undated) DEVICE — TRAP FLD MINIVAC MEGADYNE 100ML

## (undated) DEVICE — PK HEART OPN 10

## (undated) DEVICE — PK ATS CUST W CARDIOTOMY RESEVOIR

## (undated) DEVICE — MODEL AT P65, P/N 701554-001KIT CONTENTS: HAND CONTROLLER, 3-WAY HIGH-PRESSURE STOPCOCK WITH ROTATING END AND PREMIUM HIGH-PRESSURE TUBING: Brand: ANGIOTOUCH® KIT

## (undated) DEVICE — FLTR RESERV PERFUS INTERSEPT 02 STRL

## (undated) DEVICE — OASIS DRAIN, SINGLE, INLINE & ATS COMPATIBLE: Brand: OASIS

## (undated) DEVICE — SUT PROLN 6/0 C1 D/A 30IN 8706H

## (undated) DEVICE — GLV SURG BIOGEL LTX PF 8

## (undated) DEVICE — SUT PROLN 7/0 CV BV1 24IN 8304H BX/36

## (undated) DEVICE — CANN VESL DLP 1WY BLNT/TP 3MM

## (undated) DEVICE — SUT SILK 2 SUTUPAK TIE 60IN SA8H 2STRAND

## (undated) DEVICE — ANTIBACTERIAL UNDYED BRAIDED (POLYGLACTIN 910), SYNTHETIC ABSORBABLE SUTURE: Brand: COATED VICRYL

## (undated) DEVICE — CLTH CLENS READYCLEANSE PERI CARE PK/5

## (undated) DEVICE — SENSR CERBRL O2 PK/2

## (undated) DEVICE — TUBING, SUCTION, 1/4" X 10', STRAIGHT: Brand: MEDLINE

## (undated) DEVICE — SUCTION CANISTER 2500CC: Brand: DEROYAL

## (undated) DEVICE — CATH DIAG IMPULSE LCB 6F 100CM

## (undated) DEVICE — NDL ANGIOGR ADV THN SMOTH SGLWALL 21G 1.5

## (undated) DEVICE — SYS VASOVIEW HEMOPRO ENDOSCOPIC HARVST VESL

## (undated) DEVICE — TR BAND RADIAL ARTERY COMPRESSION DEVICE: Brand: TR BAND

## (undated) DEVICE — PK PERFUS CUST W/CARDIOPLEGIA

## (undated) DEVICE — TBG PENCL TELESCP MEGADYNE SMOKE EVAC 10FT

## (undated) DEVICE — SUT SILK 4/0 TIES 18IN A183H

## (undated) DEVICE — BLD SCLPL BEAVR MINI STR 2BVL 180D LF